# Patient Record
Sex: MALE | Race: WHITE | Employment: OTHER | URBAN - NONMETROPOLITAN AREA
[De-identification: names, ages, dates, MRNs, and addresses within clinical notes are randomized per-mention and may not be internally consistent; named-entity substitution may affect disease eponyms.]

---

## 2017-01-30 ENCOUNTER — TELEPHONE (OUTPATIENT)
Dept: NEUROLOGY | Age: 82
End: 2017-01-30

## 2017-02-07 DIAGNOSIS — G30.1 LATE ONSET ALZHEIMER'S DISEASE WITHOUT BEHAVIORAL DISTURBANCE (HCC): ICD-10-CM

## 2017-02-07 DIAGNOSIS — F02.80 LATE ONSET ALZHEIMER'S DISEASE WITHOUT BEHAVIORAL DISTURBANCE (HCC): ICD-10-CM

## 2017-02-07 RX ORDER — NUT.TX,METAB.DIS, MV-MINS NO.2 20GRAM/40
1 POWDER IN PACKET (EA) ORAL DAILY
Qty: 30 EACH | Refills: 5 | Status: SHIPPED | OUTPATIENT
Start: 2017-02-07 | End: 2017-10-06 | Stop reason: HOSPADM

## 2017-04-06 ENCOUNTER — OFFICE VISIT (OUTPATIENT)
Dept: NEUROLOGY | Age: 82
End: 2017-04-06
Payer: MEDICARE

## 2017-04-06 VITALS
HEIGHT: 70 IN | DIASTOLIC BLOOD PRESSURE: 69 MMHG | BODY MASS INDEX: 26.92 KG/M2 | SYSTOLIC BLOOD PRESSURE: 138 MMHG | WEIGHT: 188 LBS | HEART RATE: 58 BPM

## 2017-04-06 DIAGNOSIS — R41.3 MEMORY LOSS: ICD-10-CM

## 2017-04-06 DIAGNOSIS — F02.80 LATE ONSET ALZHEIMER'S DISEASE WITHOUT BEHAVIORAL DISTURBANCE (HCC): Primary | ICD-10-CM

## 2017-04-06 DIAGNOSIS — Z86.59 HISTORY OF DEPRESSION: ICD-10-CM

## 2017-04-06 DIAGNOSIS — G30.1 LATE ONSET ALZHEIMER'S DISEASE WITHOUT BEHAVIORAL DISTURBANCE (HCC): Primary | ICD-10-CM

## 2017-04-06 PROCEDURE — 99214 OFFICE O/P EST MOD 30 MIN: CPT | Performed by: PSYCHIATRY & NEUROLOGY

## 2017-04-06 RX ORDER — HYDROCHLOROTHIAZIDE 12.5 MG/1
12.5 CAPSULE, GELATIN COATED ORAL DAILY
COMMUNITY
End: 2022-10-17

## 2017-05-05 ENCOUNTER — APPOINTMENT (OUTPATIENT)
Dept: PREADMISSION TESTING | Facility: HOSPITAL | Age: 82
End: 2017-05-05

## 2017-05-05 VITALS
HEART RATE: 54 BPM | BODY MASS INDEX: 28.79 KG/M2 | SYSTOLIC BLOOD PRESSURE: 148 MMHG | OXYGEN SATURATION: 96 % | RESPIRATION RATE: 20 BRPM | HEIGHT: 68 IN | DIASTOLIC BLOOD PRESSURE: 72 MMHG | WEIGHT: 190 LBS

## 2017-05-05 LAB
ANION GAP SERPL CALCULATED.3IONS-SCNC: 10 MMOL/L (ref 4–13)
BUN BLD-MCNC: 18 MG/DL (ref 5–21)
BUN/CREAT SERPL: 18.4 (ref 7–25)
CALCIUM SPEC-SCNC: 10 MG/DL (ref 8.4–10.4)
CHLORIDE SERPL-SCNC: 101 MMOL/L (ref 98–110)
CO2 SERPL-SCNC: 29 MMOL/L (ref 24–31)
CREAT BLD-MCNC: 0.98 MG/DL (ref 0.5–1.4)
DEPRECATED RDW RBC AUTO: 39.2 FL (ref 40–54)
ERYTHROCYTE [DISTWIDTH] IN BLOOD BY AUTOMATED COUNT: 12.4 % (ref 12–15)
GFR SERPL CREATININE-BSD FRML MDRD: 74 ML/MIN/1.73
GLUCOSE BLD-MCNC: 87 MG/DL (ref 70–100)
HCT VFR BLD AUTO: 43.4 % (ref 40–52)
HGB BLD-MCNC: 15.1 G/DL (ref 14–18)
MCH RBC QN AUTO: 30 PG (ref 28–32)
MCHC RBC AUTO-ENTMCNC: 34.8 G/DL (ref 33–36)
MCV RBC AUTO: 86.1 FL (ref 82–95)
PLATELET # BLD AUTO: 197 10*3/MM3 (ref 130–400)
PMV BLD AUTO: 9.7 FL (ref 6–12)
POTASSIUM BLD-SCNC: 4.7 MMOL/L (ref 3.5–5.3)
RBC # BLD AUTO: 5.04 10*6/MM3 (ref 4.8–5.9)
SODIUM BLD-SCNC: 140 MMOL/L (ref 135–145)
WBC NRBC COR # BLD: 6.41 10*3/MM3 (ref 4.8–10.8)

## 2017-05-05 PROCEDURE — 85027 COMPLETE CBC AUTOMATED: CPT | Performed by: ORTHOPAEDIC SURGERY

## 2017-05-05 PROCEDURE — 93010 ELECTROCARDIOGRAM REPORT: CPT | Performed by: INTERNAL MEDICINE

## 2017-05-05 PROCEDURE — 80048 BASIC METABOLIC PNL TOTAL CA: CPT | Performed by: ORTHOPAEDIC SURGERY

## 2017-05-05 PROCEDURE — 93005 ELECTROCARDIOGRAM TRACING: CPT

## 2017-05-05 RX ORDER — CITALOPRAM 20 MG/1
20 TABLET ORAL 2 TIMES DAILY
COMMUNITY
End: 2021-01-05 | Stop reason: SDUPTHER

## 2017-05-05 RX ORDER — AMLODIPINE BESYLATE 10 MG/1
10 TABLET ORAL 2 TIMES DAILY
COMMUNITY
End: 2021-04-15

## 2017-05-05 RX ORDER — HYDROCHLOROTHIAZIDE 12.5 MG/1
12.5 TABLET ORAL DAILY
COMMUNITY
End: 2020-06-08

## 2017-05-05 RX ORDER — ASPIRIN 81 MG/1
81 TABLET ORAL 2 TIMES DAILY
COMMUNITY
End: 2021-10-28

## 2017-05-05 RX ORDER — RAMIPRIL 10 MG/1
10 CAPSULE ORAL 2 TIMES DAILY
COMMUNITY
End: 2021-04-15

## 2017-05-12 ENCOUNTER — HOSPITAL ENCOUNTER (OUTPATIENT)
Facility: HOSPITAL | Age: 82
Setting detail: HOSPITAL OUTPATIENT SURGERY
Discharge: HOME | End: 2017-05-12
Attending: ORTHOPAEDIC SURGERY | Admitting: ORTHOPAEDIC SURGERY

## 2017-05-12 ENCOUNTER — ANESTHESIA (OUTPATIENT)
Dept: PERIOP | Facility: HOSPITAL | Age: 82
End: 2017-05-12

## 2017-05-12 ENCOUNTER — ANESTHESIA EVENT (OUTPATIENT)
Dept: PERIOP | Facility: HOSPITAL | Age: 82
End: 2017-05-12

## 2017-05-12 VITALS
OXYGEN SATURATION: 98 % | SYSTOLIC BLOOD PRESSURE: 134 MMHG | TEMPERATURE: 98.1 F | HEART RATE: 57 BPM | RESPIRATION RATE: 18 BRPM | DIASTOLIC BLOOD PRESSURE: 48 MMHG

## 2017-05-12 PROCEDURE — 25010000002 ONDANSETRON PER 1 MG: Performed by: ANESTHESIOLOGY

## 2017-05-12 PROCEDURE — 25010000002 PROPOFOL 1000 MG/ML EMULSION: Performed by: NURSE ANESTHETIST, CERTIFIED REGISTERED

## 2017-05-12 PROCEDURE — 25010000003 CEFAZOLIN PER 500 MG: Performed by: ORTHOPAEDIC SURGERY

## 2017-05-12 RX ORDER — MORPHINE SULFATE 2 MG/ML
2 INJECTION, SOLUTION INTRAMUSCULAR; INTRAVENOUS
Status: DISCONTINUED | OUTPATIENT
Start: 2017-05-12 | End: 2017-05-12 | Stop reason: HOSPADM

## 2017-05-12 RX ORDER — DEXTROSE MONOHYDRATE 25 G/50ML
12.5 INJECTION, SOLUTION INTRAVENOUS AS NEEDED
Status: DISCONTINUED | OUTPATIENT
Start: 2017-05-12 | End: 2017-05-12 | Stop reason: HOSPADM

## 2017-05-12 RX ORDER — ONDANSETRON 2 MG/ML
4 INJECTION INTRAMUSCULAR; INTRAVENOUS ONCE AS NEEDED
Status: COMPLETED | OUTPATIENT
Start: 2017-05-12 | End: 2017-05-12

## 2017-05-12 RX ORDER — MEPERIDINE HYDROCHLORIDE 25 MG/ML
12.5 INJECTION INTRAMUSCULAR; INTRAVENOUS; SUBCUTANEOUS
Status: DISCONTINUED | OUTPATIENT
Start: 2017-05-12 | End: 2017-05-12 | Stop reason: HOSPADM

## 2017-05-12 RX ORDER — ONDANSETRON 4 MG/1
4 TABLET, FILM COATED ORAL EVERY 8 HOURS PRN
Qty: 20 TABLET | Refills: 0 | Status: SHIPPED | OUTPATIENT
Start: 2017-05-12 | End: 2018-09-30

## 2017-05-12 RX ORDER — SODIUM CHLORIDE 0.9 % (FLUSH) 0.9 %
1-10 SYRINGE (ML) INJECTION AS NEEDED
Status: DISCONTINUED | OUTPATIENT
Start: 2017-05-12 | End: 2017-05-12 | Stop reason: HOSPADM

## 2017-05-12 RX ORDER — LIDOCAINE HYDROCHLORIDE 20 MG/ML
INJECTION, SOLUTION INFILTRATION; PERINEURAL AS NEEDED
Status: DISCONTINUED | OUTPATIENT
Start: 2017-05-12 | End: 2017-05-12 | Stop reason: SURG

## 2017-05-12 RX ORDER — HYDRALAZINE HYDROCHLORIDE 20 MG/ML
5 INJECTION INTRAMUSCULAR; INTRAVENOUS
Status: DISCONTINUED | OUTPATIENT
Start: 2017-05-12 | End: 2017-05-12 | Stop reason: HOSPADM

## 2017-05-12 RX ORDER — FENTANYL CITRATE 50 UG/ML
25 INJECTION, SOLUTION INTRAMUSCULAR; INTRAVENOUS
Status: DISCONTINUED | OUTPATIENT
Start: 2017-05-12 | End: 2017-05-12 | Stop reason: HOSPADM

## 2017-05-12 RX ORDER — IPRATROPIUM BROMIDE AND ALBUTEROL SULFATE 2.5; .5 MG/3ML; MG/3ML
3 SOLUTION RESPIRATORY (INHALATION) ONCE AS NEEDED
Status: DISCONTINUED | OUTPATIENT
Start: 2017-05-12 | End: 2017-05-12 | Stop reason: HOSPADM

## 2017-05-12 RX ORDER — SODIUM CHLORIDE, SODIUM LACTATE, POTASSIUM CHLORIDE, CALCIUM CHLORIDE 600; 310; 30; 20 MG/100ML; MG/100ML; MG/100ML; MG/100ML
9 INJECTION, SOLUTION INTRAVENOUS CONTINUOUS
Status: DISCONTINUED | OUTPATIENT
Start: 2017-05-12 | End: 2017-05-12 | Stop reason: HOSPADM

## 2017-05-12 RX ORDER — HYDROCODONE BITARTRATE AND ACETAMINOPHEN 5; 325 MG/1; MG/1
1 TABLET ORAL ONCE AS NEEDED
Status: DISCONTINUED | OUTPATIENT
Start: 2017-05-12 | End: 2017-05-12 | Stop reason: HOSPADM

## 2017-05-12 RX ORDER — ONDANSETRON 2 MG/ML
4 INJECTION INTRAMUSCULAR; INTRAVENOUS ONCE AS NEEDED
Status: DISCONTINUED | OUTPATIENT
Start: 2017-05-12 | End: 2017-05-12 | Stop reason: HOSPADM

## 2017-05-12 RX ORDER — HYDROCODONE BITARTRATE AND ACETAMINOPHEN 5; 325 MG/1; MG/1
1 TABLET ORAL EVERY 4 HOURS PRN
Qty: 30 TABLET | Refills: 0 | Status: SHIPPED | OUTPATIENT
Start: 2017-05-12 | End: 2018-09-30

## 2017-05-12 RX ORDER — LABETALOL HYDROCHLORIDE 5 MG/ML
5 INJECTION, SOLUTION INTRAVENOUS
Status: DISCONTINUED | OUTPATIENT
Start: 2017-05-12 | End: 2017-05-12 | Stop reason: HOSPADM

## 2017-05-12 RX ORDER — LIDOCAINE HYDROCHLORIDE 5 MG/ML
INJECTION, SOLUTION INFILTRATION; INTRAVENOUS AS NEEDED
Status: DISCONTINUED | OUTPATIENT
Start: 2017-05-12 | End: 2017-05-12 | Stop reason: SURG

## 2017-05-12 RX ORDER — MAGNESIUM HYDROXIDE 1200 MG/15ML
LIQUID ORAL AS NEEDED
Status: DISCONTINUED | OUTPATIENT
Start: 2017-05-12 | End: 2017-05-12 | Stop reason: HOSPADM

## 2017-05-12 RX ORDER — DIPHENHYDRAMINE HYDROCHLORIDE 50 MG/ML
12.5 INJECTION INTRAMUSCULAR; INTRAVENOUS
Status: DISCONTINUED | OUTPATIENT
Start: 2017-05-12 | End: 2017-05-12 | Stop reason: HOSPADM

## 2017-05-12 RX ORDER — NALOXONE HCL 0.4 MG/ML
0.4 VIAL (ML) INJECTION AS NEEDED
Status: DISCONTINUED | OUTPATIENT
Start: 2017-05-12 | End: 2017-05-12 | Stop reason: HOSPADM

## 2017-05-12 RX ADMIN — LIDOCAINE HYDROCHLORIDE 50 ML: 5 INJECTION, SOLUTION INFILTRATION at 09:17

## 2017-05-12 RX ADMIN — SODIUM CHLORIDE, POTASSIUM CHLORIDE, SODIUM LACTATE AND CALCIUM CHLORIDE 9 ML/HR: 600; 310; 30; 20 INJECTION, SOLUTION INTRAVENOUS at 07:51

## 2017-05-12 RX ADMIN — HYDROCODONE BITARTRATE AND ACETAMINOPHEN 1 TABLET: 5; 325 TABLET ORAL at 09:57

## 2017-05-12 RX ADMIN — ONDANSETRON 4 MG: 2 INJECTION INTRAMUSCULAR; INTRAVENOUS at 09:58

## 2017-05-12 RX ADMIN — CEFAZOLIN 2 G: 1 INJECTION, POWDER, FOR SOLUTION INTRAVENOUS at 09:18

## 2017-05-12 RX ADMIN — LIDOCAINE HYDROCHLORIDE 0.5 ML: 10 INJECTION, SOLUTION EPIDURAL; INFILTRATION; INTRACAUDAL; PERINEURAL at 07:51

## 2017-05-12 RX ADMIN — LIDOCAINE HYDROCHLORIDE 50 MG: 20 INJECTION, SOLUTION INFILTRATION; PERINEURAL at 09:14

## 2017-05-12 RX ADMIN — PROPOFOL 100 MCG/KG/MIN: 10 INJECTION, EMULSION INTRAVENOUS at 09:14

## 2017-10-06 ENCOUNTER — OFFICE VISIT (OUTPATIENT)
Dept: NEUROLOGY | Age: 82
End: 2017-10-06
Payer: MEDICARE

## 2017-10-06 VITALS
OXYGEN SATURATION: 98 % | DIASTOLIC BLOOD PRESSURE: 64 MMHG | WEIGHT: 182 LBS | HEIGHT: 70 IN | SYSTOLIC BLOOD PRESSURE: 122 MMHG | BODY MASS INDEX: 26.05 KG/M2 | HEART RATE: 57 BPM

## 2017-10-06 DIAGNOSIS — Z86.59 HISTORY OF DEPRESSION: ICD-10-CM

## 2017-10-06 DIAGNOSIS — F02.80 LATE ONSET ALZHEIMER'S DISEASE WITHOUT BEHAVIORAL DISTURBANCE (HCC): Primary | ICD-10-CM

## 2017-10-06 DIAGNOSIS — G30.1 LATE ONSET ALZHEIMER'S DISEASE WITHOUT BEHAVIORAL DISTURBANCE (HCC): Primary | ICD-10-CM

## 2017-10-06 DIAGNOSIS — R41.3 MEMORY LOSS: ICD-10-CM

## 2017-10-06 PROCEDURE — 99214 OFFICE O/P EST MOD 30 MIN: CPT | Performed by: PSYCHIATRY & NEUROLOGY

## 2017-10-06 NOTE — PROGRESS NOTES
Chief Complaint   Patient presents with    Follow-up     Late onset Alzheimer's disease without behavioral disturbance       Shahida Reyes is a 80y.o. year old male who is seen for evaluation of probable Alzheimer's disease. Luis A iKnney He continues on Namenda and  Aricept. He ran out of the axona powder about a month ago as it has apparently stopped being manufactured. He is here today with his wife. She notes that his memory has slowly worsened. No hallucinations, delusions or agitation noted. He has had a B12, TSH and MRI of the head in the past. His old records are reviewed in detail. We had an extended discussion regarding all of the above. No focal neurological difficulties noted. Luis A Kinney Has a history of depression. Active Ambulatory Problems     Diagnosis Date Noted    Anal bleeding 08/23/2013    Rectal itching 08/23/2013    History of colon polyps 08/23/2013    Trigger finger, left middle finger 12/21/2016     Resolved Ambulatory Problems     Diagnosis Date Noted    No Resolved Ambulatory Problems     Past Medical History:   Diagnosis Date    Depression     Hypertension     Memory difficulty     Osteoarthritis     Trigger finger     Urinary incontinence        Past Surgical History:   Procedure Laterality Date    CERVICAL DISC SURGERY      COLONOSCOPY  8-    BODNARCHUK    FINGER TRIGGER RELEASE Left 12/21/2016    MIDDLE FINGER TRIGGER RELEASE performed by Abdiel Belle MD at [address] [place] [address]      RECTAL SURGERY      RECTAL NODULE REMOVAL    RECTAL SURGERY      FISSURE REPAIR    TONSILLECTOMY AND ADENOIDECTOMY         Family History   Problem Relation Age of Onset    Colon Cancer Neg Hx     Colon Polyps Neg Hx        Allergies   Allergen Reactions    Versed [Midazolam]      Patient already has memory issues and does not want versed.        Social History     Social History    Marital status:      Spouse name: N/A    Number of children: N/A    Years of education: N/A Occupational History    Not on file. Social History Main Topics    Smoking status: Never Smoker    Smokeless tobacco: Never Used    Alcohol use No    Drug use: No    Sexual activity: Not on file     Other Topics Concern    Not on file     Social History Narrative       Review of Systems       Constitutional - No fever or chills. No diaphoresis or significant fatigue. HENT -  No tinnitus or significant hearing loss. Eyes - no sudden vision change or eye pain  Respiratory - no significant shortness of breath or cough  Cardiovascular - no chest pain No palpitations or significant leg swelling  Gastrointestinal - no abdominal swelling or pain. Genitourinary - No difficulty urinating, dysuria  Musculoskeletal - no back pain or myalgia. Skin - no color change or rash  Neurologic - No seizures. No lateralizing weakness. Hematologic - no easy bruising or excessive bleeding. Psychiatric - no severe anxiety or nervousness. All other review of systems are negative.          Current Outpatient Prescriptions on File Prior to Visit   Medication Sig Dispense Refill    hydrochlorothiazide (MICROZIDE) 12.5 MG capsule Take 12.5 mg by mouth daily      aspirin 81 MG tablet Take 81 mg by mouth 2 times daily      amLODIPine (NORVASC) 10 MG tablet Take 10 mg by mouth 2 times daily       citalopram (CELEXA) 10 MG tablet Take 10 mg by mouth Takes 2 tablets in am and 1 pm      ramipril (ALTACE) 10 MG capsule Take 10 mg by mouth 2 times daily        No current facility-administered medications on file prior to visit. /64  Pulse 57  Ht 5' 10\" (1.778 m)  Wt 182 lb (82.6 kg)  SpO2 98%  BMI 26.11 kg/m2  (please note that spurious BP readings frequently obtained in this room)    Constitutional - well developed, well nourished.     Eyes - conjunctiva normal.  Pupils react to light  Ear, nose, throat -hearing intact to finger rub No scars, masses, or lesions over external nose or ears, no atrophy of tongue  Neck-symmetric, no masses noted, no jugular vein distension. No bruits noted. Respiration- chest wall appears symmetric, good expansion,   normal effort without use of accessory muscles  Cardiovascular- RRR  Musculoskeletal - no significant wasting of muscles noted, no bony deformities, gait no gross ataxia  Extremities-no clubbing, cyanosis or edema  Skin - warm, dry, and intact. No rash, erythema, or pallor. Psychiatric - mood, affect, and behavior appear normal.      Neurological exam  Awake and alert. Normal clock drawing. He could not tell me the month but stated it was \"late summer\". When asked what happened in [facility] recently he was finally able to say \"I think someone got shot\". He watches the news a lot. He did great with following complex commands. Speech normal without dysarthria  No clear issues with language of fund of knowledge    Cranial Nerve Exam   CN II- Visual fields grossly unremarkable. VA adequate. PERRLA. CN III, IV,VI-EOMI, No nystagmus, conjugate eye movements, no ptosis  CN V-sensation intact to LT over face  CN VII-no facial asymmetry  CN VIII-Hearing intact to finger rub  CN IX and X- Palate elevates in midline  CN XI-good shoulder shrug  CN XII-Tongue midline with no fasciculations or fibrillations    Motor Exam  V/V throughout upper and lower extremities bilaterally, no cogwheeling, normal tone      Reflexes   2+ biceps bilaterally  2+ brachioradialis  2+ triceps  2+patella  2+ ankle jerks  No clonus ankles  No Oakley's sign bilateral hands. No Babinski sign. Tremors- no tremors in hands or head noted    Gait  Normal base and speed  No ataxia.  No Romberg sign    Coordination  Finger to nose-unremarkable  HTS- unremarkable  No results found for: [id number]  Lab Results   Component Value Date    WBC 11.64 (H) 05/20/2015    HGB 12.4 (L) 05/20/2015    HCT 36.5 (L) 05/20/2015    MCV 88.4 05/20/2015     05/20/2015     Lab Results   Component Value Date

## 2017-10-06 NOTE — PROGRESS NOTES

## 2017-10-06 NOTE — MR AVS SNAPSHOT
After Visit Summary             Margo Lopez   10/6/2017 9:00 AM   Office Visit    Description:  Male : 1935   Provider:  Jason Dumont MD   Department:  [facility]              Your Follow-Up and Future Appointments         Below is a list of your follow-up and future appointments. This may not be a complete list as you may have made appointments directly with providers that we are not aware of or your providers may have made some for you. Please call your providers to confirm appointments. It is important to keep your appointments. Please bring your current insurance card, photo ID, co-pay, and all medication bottles to your appointment. If self-pay, payment is expected at the time of service. Your To-Do List     Future Appointments Provider Department Dept Phone    10/8/2018 8:00 AM Jason Dumont MD [facility] [phone]    Please arrive 15 minutes prior to appointment, bring photo ID and insurance card. Follow-Up    Return in about 1 year (around 10/6/2018). Information from Your Visit        Department     Name Address Phone Fax    [address] [place] [address] [address]  [place] [address] [phone]      You Were Seen for:         Comments    Late onset Alzheimer's disease without behavioral disturbance   [[id number]]         Vital Signs     Blood Pressure Pulse Height Weight Oxygen Saturation Body Mass Index    122/64 57 5' 10\" (1.778 m) 182 lb (82.6 kg) 98% 26.11 kg/m2    Smoking Status                   Never Smoker           Additional Information about your Body Mass Index (BMI)           Your BMI as listed above is considered overweight (25.0-29.9). BMI is an estimate of body fat, calculated from your height and weight.   The higher your BMI, the greater your risk of heart disease, high blood pressure, type 2 diabetes, stroke, gallstones, arthritis, sleep apnea, and certain cancers. BMI is not perfect. It may overestimate body fat in athletes and people who are more muscular. If your body fat is high you can improve your BMI by decreasing your calorie intake and becoming more physically active. Learn more at: [email or web address].[email or web address]             Today's Medication Changes          These changes are accurate as of: 10/6/17  9:50 AM.  If you have any questions, ask your nurse or doctor. STOP taking these medications           AXONA Pack   Stopped by:  Richar Dunham MD       ondansetron 4 MG tablet   Commonly known as:  Carolina Belle by:  Richar Dunham MD            Where to Get Your Medications      These medications were sent to [facility], [address] [address] [facility], [address] [place] [address]     Phone:  [phone]     Memantine HCl-Donepezil HCl 28-10 MG Cp24               Your Current Medications Are              Memantine HCl-Donepezil HCl (NAMZARIC) 28-10 MG CP24 Take 1 capsule by mouth daily    hydrochlorothiazide (MICROZIDE) 12.5 MG capsule Take 12.5 mg by mouth daily    aspirin 81 MG tablet Take 81 mg by mouth 2 times daily    amLODIPine (NORVASC) 10 MG tablet Take 10 mg by mouth 2 times daily     citalopram (CELEXA) 10 MG tablet Take 10 mg by mouth Takes 2 tablets in am and 1 pm    ramipril (ALTACE) 10 MG capsule Take 10 mg by mouth 2 times daily       Allergies              Versed [Midazolam]     Patient already has memory issues and does not want versed.          Additional Information        Basic Information     Date Of Birth Sex Race Ethnicity Preferred Language Preferred Written Language    1935 Male White Non-/Non  English English      Problem List as of 10/6/2017  Date Reviewed: 8/23/2013                Trigger finger, left middle finger    Anal bleeding    Rectal itching    History of colon polyps      Preventive Care        Date Due Tetanus Combination Vaccine (1 - Tdap) 12/31/1956    Cholesterol Screening 12/31/1977    Zoster Vaccine 12/31/1997    Pneumococcal Vaccines (two) for all adults aged 72 and over (1 of 2 - PCV13) 12/31/2002    Yearly Flu Vaccine (1) 9/1/2017            MyChart Signup           [employer] allows you to send messages to your doctor, view your test results, renew your prescriptions, schedule appointments, view visit notes, and more. How Do I Sign Up? 1. In your Internet browser, go to [email or web address]  2. Click on the Sign Up Now link in the Sign In box. You will see the New Member Sign Up page. 3. Enter your [employer]end[employer] Access Code exactly as it appears below. You will not need to use this code after youve completed the sign-up process. If you do not sign up before the expiration date, you must request a new code. Blend[employer] Access Code: [email or web address]  Expires: 12/5/2017  9:50 AM    4. Enter your Social Security Number ([id number]) and Date of Birth (mm/dd/yyyy) as indicated and click Submit. You will be taken to the next sign-up page. 5. Create a Blendagram ID. This will be your [employer] login ID and cannot be changed, so think of one that is secure and easy to remember. 6. Create a Blendagram password. You can change your password at any time. 7. Enter your Password Reset Question and Answer. This can be used at a later time if you forget your password. 8. Enter your e-mail address. You will receive e-mail notification when new information is available in [address]. 9. Click Sign Up. You can now view your medical record. Additional Information  If you have questions, please contact the physician practice where you receive care. Remember, [employer] is NOT to be used for urgent needs. For medical emergencies, dial 911. For questions regarding your [employer] account call [phone]. If you have a clinical question, please call your doctor's office.

## 2017-10-10 ENCOUNTER — TELEPHONE (OUTPATIENT)
Dept: NEUROLOGY | Age: 82
End: 2017-10-10

## 2017-11-01 ENCOUNTER — TELEPHONE (OUTPATIENT)
Dept: NEUROSURGERY | Age: 82
End: 2017-11-01

## 2017-11-01 NOTE — TELEPHONE ENCOUNTER
Spoke to the rep and the patient. We are working on getting the copay lowered and we have samples for him to cover for a month while we are working on getting the copay lowered.

## 2017-11-01 NOTE — TELEPHONE ENCOUNTER
Patient is having issues paying for namzaric. It is too expensive, is there anyway to get it cheaper? Or is there a coupon that she can have?  If they have to pay it then that's fine but its $150 out of pocket

## 2017-12-05 NOTE — TELEPHONE ENCOUNTER
Patients wife called and wanting to know about what is going on with the medication approval.  She would like a call back please.

## 2017-12-06 ENCOUNTER — TELEPHONE (OUTPATIENT)
Dept: NEUROLOGY | Age: 82
End: 2017-12-06

## 2017-12-06 NOTE — TELEPHONE ENCOUNTER
Looks like we may just want to send in Aricept and Namenda separately. If this is so, please to these up and I will sign.

## 2017-12-06 NOTE — TELEPHONE ENCOUNTER
Patients insurance will not pay for the month of December for the medication. Patients wife wants to know if we have samples for the namazric. Insurance will pick it back up in January. Patient has been without medication for a few days now.

## 2017-12-07 NOTE — TELEPHONE ENCOUNTER
Called patient and informed we did not have samples at this time but we can send in aricept and namenda to [facility] pharmacy.  All teed up and sent to dr Lashawn Drake

## 2017-12-08 RX ORDER — DONEPEZIL HYDROCHLORIDE 10 MG/1
10 TABLET, FILM COATED ORAL NIGHTLY
Qty: 30 TABLET | Refills: 5 | Status: SHIPPED | OUTPATIENT
Start: 2017-12-08 | End: 2022-10-17

## 2017-12-08 RX ORDER — MEMANTINE HYDROCHLORIDE 10 MG/1
10 TABLET ORAL 2 TIMES DAILY
Qty: 60 TABLET | Refills: 5 | Status: SHIPPED | OUTPATIENT
Start: 2017-12-08 | End: 2018-10-29

## 2018-01-02 RX ORDER — MEMANTINE HYDROCHLORIDE AND DONEPEZIL HYDROCHLORIDE 28; 10 MG/1; MG/1
1 CAPSULE ORAL DAILY
Qty: 30 CAPSULE | Refills: 0 | Status: SHIPPED | OUTPATIENT
Start: 2018-01-02 | End: 2018-10-29 | Stop reason: SDUPTHER

## 2018-09-06 ENCOUNTER — TELEPHONE (OUTPATIENT)
Dept: NEUROLOGY | Age: 83
End: 2018-09-06

## 2018-09-30 ENCOUNTER — APPOINTMENT (OUTPATIENT)
Dept: ULTRASOUND IMAGING | Facility: HOSPITAL | Age: 83
End: 2018-09-30

## 2018-09-30 ENCOUNTER — HOSPITAL ENCOUNTER (EMERGENCY)
Facility: HOSPITAL | Age: 83
Discharge: HOME | End: 2018-09-30
Admitting: EMERGENCY MEDICINE

## 2018-09-30 VITALS
BODY MASS INDEX: 26.45 KG/M2 | HEIGHT: 70 IN | OXYGEN SATURATION: 96 % | SYSTOLIC BLOOD PRESSURE: 122 MMHG | DIASTOLIC BLOOD PRESSURE: 67 MMHG | TEMPERATURE: 98.2 F | HEART RATE: 59 BPM | WEIGHT: 184.8 LBS | RESPIRATION RATE: 15 BRPM

## 2018-09-30 DIAGNOSIS — L03.90 CELLULITIS, UNSPECIFIED CELLULITIS SITE: ICD-10-CM

## 2018-09-30 DIAGNOSIS — R21 RASH: Primary | ICD-10-CM

## 2018-09-30 LAB
ALBUMIN SERPL-MCNC: 4 G/DL (ref 3.5–5)
ALBUMIN/GLOB SERPL: 1.4 G/DL (ref 1.1–2.5)
ALP SERPL-CCNC: 63 U/L (ref 24–120)
ALT SERPL W P-5'-P-CCNC: 21 U/L (ref 0–54)
ANION GAP SERPL CALCULATED.3IONS-SCNC: 9 MMOL/L (ref 4–13)
AST SERPL-CCNC: 25 U/L (ref 7–45)
BASOPHILS # BLD AUTO: 0.02 10*3/MM3 (ref 0–0.2)
BASOPHILS NFR BLD AUTO: 0.3 % (ref 0–2)
BILIRUB SERPL-MCNC: 0.7 MG/DL (ref 0.1–1)
BUN BLD-MCNC: 25 MG/DL (ref 5–21)
BUN/CREAT SERPL: 19.8 (ref 7–25)
CALCIUM SPEC-SCNC: 9.4 MG/DL (ref 8.4–10.4)
CHLORIDE SERPL-SCNC: 105 MMOL/L (ref 98–110)
CO2 SERPL-SCNC: 26 MMOL/L (ref 24–31)
CREAT BLD-MCNC: 1.26 MG/DL (ref 0.5–1.4)
DEPRECATED RDW RBC AUTO: 39.7 FL (ref 40–54)
EOSINOPHIL # BLD AUTO: 0.45 10*3/MM3 (ref 0–0.7)
EOSINOPHIL NFR BLD AUTO: 6.6 % (ref 0–4)
ERYTHROCYTE [DISTWIDTH] IN BLOOD BY AUTOMATED COUNT: 12.4 % (ref 12–15)
GFR SERPL CREATININE-BSD FRML MDRD: 55 ML/MIN/1.73
GLOBULIN UR ELPH-MCNC: 2.8 GM/DL
GLUCOSE BLD-MCNC: 106 MG/DL (ref 70–100)
HCT VFR BLD AUTO: 41.6 % (ref 40–52)
HGB BLD-MCNC: 14.3 G/DL (ref 14–18)
IMM GRANULOCYTES # BLD: 0.02 10*3/MM3 (ref 0–0.03)
IMM GRANULOCYTES NFR BLD: 0.3 % (ref 0–5)
LYMPHOCYTES # BLD AUTO: 1.35 10*3/MM3 (ref 0.72–4.86)
LYMPHOCYTES NFR BLD AUTO: 19.9 % (ref 15–45)
MCH RBC QN AUTO: 29.9 PG (ref 28–32)
MCHC RBC AUTO-ENTMCNC: 34.4 G/DL (ref 33–36)
MCV RBC AUTO: 87 FL (ref 82–95)
MONOCYTES # BLD AUTO: 0.59 10*3/MM3 (ref 0.19–1.3)
MONOCYTES NFR BLD AUTO: 8.7 % (ref 4–12)
NEUTROPHILS # BLD AUTO: 4.34 10*3/MM3 (ref 1.87–8.4)
NEUTROPHILS NFR BLD AUTO: 64.2 % (ref 39–78)
NRBC BLD MANUAL-RTO: 0 /100 WBC (ref 0–0)
PLATELET # BLD AUTO: 224 10*3/MM3 (ref 130–400)
PMV BLD AUTO: 9.4 FL (ref 6–12)
POTASSIUM BLD-SCNC: 4.3 MMOL/L (ref 3.5–5.3)
PROT SERPL-MCNC: 6.8 G/DL (ref 6.3–8.7)
RBC # BLD AUTO: 4.78 10*6/MM3 (ref 4.8–5.9)
SODIUM BLD-SCNC: 140 MMOL/L (ref 135–145)
WBC NRBC COR # BLD: 6.77 10*3/MM3 (ref 4.8–10.8)

## 2018-09-30 PROCEDURE — 85025 COMPLETE CBC W/AUTO DIFF WBC: CPT | Performed by: NURSE PRACTITIONER

## 2018-09-30 PROCEDURE — 96375 TX/PRO/DX INJ NEW DRUG ADDON: CPT

## 2018-09-30 PROCEDURE — 96374 THER/PROPH/DIAG INJ IV PUSH: CPT

## 2018-09-30 PROCEDURE — 93971 EXTREMITY STUDY: CPT

## 2018-09-30 PROCEDURE — 25010000002 METHYLPREDNISOLONE PER 125 MG: Performed by: NURSE PRACTITIONER

## 2018-09-30 PROCEDURE — 25010000002 DIPHENHYDRAMINE PER 50 MG: Performed by: NURSE PRACTITIONER

## 2018-09-30 PROCEDURE — 93971 EXTREMITY STUDY: CPT | Performed by: SURGERY

## 2018-09-30 PROCEDURE — 99283 EMERGENCY DEPT VISIT LOW MDM: CPT

## 2018-09-30 PROCEDURE — 80053 COMPREHEN METABOLIC PANEL: CPT | Performed by: NURSE PRACTITIONER

## 2018-09-30 RX ORDER — METHYLPREDNISOLONE SODIUM SUCCINATE 125 MG/2ML
125 INJECTION, POWDER, LYOPHILIZED, FOR SOLUTION INTRAMUSCULAR; INTRAVENOUS ONCE
Status: COMPLETED | OUTPATIENT
Start: 2018-09-30 | End: 2018-09-30

## 2018-09-30 RX ORDER — FAMOTIDINE 20 MG/1
20 TABLET, FILM COATED ORAL NIGHTLY
Qty: 5 TABLET | Refills: 0 | Status: SHIPPED | OUTPATIENT
Start: 2018-09-30 | End: 2018-10-05

## 2018-09-30 RX ORDER — SODIUM CHLORIDE 0.9 % (FLUSH) 0.9 %
10 SYRINGE (ML) INJECTION AS NEEDED
Status: DISCONTINUED | OUTPATIENT
Start: 2018-09-30 | End: 2018-09-30 | Stop reason: HOSPADM

## 2018-09-30 RX ORDER — DIPHENHYDRAMINE HYDROCHLORIDE 50 MG/ML
12.5 INJECTION INTRAMUSCULAR; INTRAVENOUS ONCE
Status: COMPLETED | OUTPATIENT
Start: 2018-09-30 | End: 2018-09-30

## 2018-09-30 RX ORDER — FAMOTIDINE 10 MG/ML
20 INJECTION, SOLUTION INTRAVENOUS ONCE
Status: COMPLETED | OUTPATIENT
Start: 2018-09-30 | End: 2018-09-30

## 2018-09-30 RX ORDER — METHYLPREDNISOLONE 4 MG/1
TABLET ORAL
Qty: 1 EACH | Refills: 0 | Status: SHIPPED | OUTPATIENT
Start: 2018-09-30 | End: 2020-10-22

## 2018-09-30 RX ORDER — CLINDAMYCIN HYDROCHLORIDE 300 MG/1
300 CAPSULE ORAL 3 TIMES DAILY
Qty: 21 CAPSULE | Refills: 0 | Status: SHIPPED | OUTPATIENT
Start: 2018-09-30 | End: 2018-10-07

## 2018-09-30 RX ADMIN — FAMOTIDINE 20 MG: 10 INJECTION, SOLUTION INTRAVENOUS at 12:58

## 2018-09-30 RX ADMIN — DIPHENHYDRAMINE HYDROCHLORIDE 12.5 MG: 50 INJECTION, SOLUTION INTRAMUSCULAR; INTRAVENOUS at 12:52

## 2018-09-30 RX ADMIN — METHYLPREDNISOLONE SODIUM SUCCINATE 125 MG: 125 INJECTION, POWDER, FOR SOLUTION INTRAMUSCULAR; INTRAVENOUS at 12:55

## 2018-09-30 RX ADMIN — Medication 10 ML: at 12:56

## 2018-10-29 ENCOUNTER — OFFICE VISIT (OUTPATIENT)
Dept: NEUROLOGY | Age: 83
End: 2018-10-29
Payer: MEDICARE

## 2018-10-29 VITALS
HEIGHT: 69 IN | SYSTOLIC BLOOD PRESSURE: 122 MMHG | DIASTOLIC BLOOD PRESSURE: 66 MMHG | BODY MASS INDEX: 27.85 KG/M2 | WEIGHT: 188 LBS

## 2018-10-29 DIAGNOSIS — G30.1 LATE ONSET ALZHEIMER'S DISEASE WITHOUT BEHAVIORAL DISTURBANCE (HCC): Primary | ICD-10-CM

## 2018-10-29 DIAGNOSIS — Z86.59 HISTORY OF DEPRESSION: ICD-10-CM

## 2018-10-29 DIAGNOSIS — F02.80 LATE ONSET ALZHEIMER'S DISEASE WITHOUT BEHAVIORAL DISTURBANCE (HCC): Primary | ICD-10-CM

## 2018-10-29 DIAGNOSIS — R41.3 MEMORY LOSS: ICD-10-CM

## 2018-10-29 PROCEDURE — 99214 OFFICE O/P EST MOD 30 MIN: CPT | Performed by: PSYCHIATRY & NEUROLOGY

## 2018-10-29 RX ORDER — METOPROLOL SUCCINATE 50 MG/1
50 TABLET, EXTENDED RELEASE ORAL DAILY
COMMUNITY
End: 2022-10-17

## 2019-09-05 ENCOUNTER — OFFICE VISIT (OUTPATIENT)
Dept: NEUROLOGY | Age: 84
End: 2019-09-05
Payer: MEDICARE

## 2019-09-05 VITALS
DIASTOLIC BLOOD PRESSURE: 77 MMHG | WEIGHT: 183 LBS | BODY MASS INDEX: 27.11 KG/M2 | HEIGHT: 69 IN | SYSTOLIC BLOOD PRESSURE: 158 MMHG | HEART RATE: 44 BPM

## 2019-09-05 DIAGNOSIS — F02.80 LATE ONSET ALZHEIMER'S DISEASE WITHOUT BEHAVIORAL DISTURBANCE (HCC): Primary | ICD-10-CM

## 2019-09-05 DIAGNOSIS — G30.1 LATE ONSET ALZHEIMER'S DISEASE WITHOUT BEHAVIORAL DISTURBANCE (HCC): Primary | ICD-10-CM

## 2019-09-05 DIAGNOSIS — R41.3 MEMORY LOSS: ICD-10-CM

## 2019-09-05 DIAGNOSIS — Z86.59 HISTORY OF DEPRESSION: ICD-10-CM

## 2019-09-05 PROCEDURE — 99214 OFFICE O/P EST MOD 30 MIN: CPT | Performed by: PSYCHIATRY & NEUROLOGY

## 2019-09-06 NOTE — PROGRESS NOTES
Spouse name: Not on file    Number of children: Not on file    Years of education: Not on file    Highest education level: Not on file   Occupational History    Not on file   Social Needs    Financial resource strain: Not on file    Food insecurity:     Worry: Not on file     Inability: Not on file    Transportation needs:     Medical: Not on file     Non-medical: Not on file   Tobacco Use    Smoking status: Never Smoker    Smokeless tobacco: Never Used   Substance and Sexual Activity    Alcohol use: No    Drug use: No    Sexual activity: Not on file   Lifestyle    Physical activity:     Days per week: Not on file     Minutes per session: Not on file    Stress: Not on file   Relationships    Social connections:     Talks on phone: Not on file     Gets together: Not on file     Attends Denominational service: Not on file     Active member of club or organization: Not on file     Attends meetings of clubs or organizations: Not on file     Relationship status: Not on file    Intimate partner violence:     Fear of current or ex partner: Not on file     Emotionally abused: Not on file     Physically abused: Not on file     Forced sexual activity: Not on file   Other Topics Concern    Not on file   Social History Narrative    Not on file       Review of Systems             Constitutional: []Fever []Sweats []Chills [] Recent Injury   [x] Denies all unless marked  HENT:[]Headache  [] Head Injury  [] Sore Throat  [] Ear Pain  [] Dizziness [] Hearing Loss   [x] Denies all unless marked  Spine:  [] Neck pain  [] Back pain  [] Sciaticia  [x] Denies all unless marked  Cardiovascular:[]Chest Pain []Palpitations [] Heart Disease  [x] Denies all unless marked  Pulmonary: []Shortness of Breath []Cough   [x] Denies all unless marked  Gastrointestinal:  []Abdominal Pain  []Blood in Stool  []Diarrhea []Constipation []Nausea  []Vomiting  [x] Denies all unless marked  Genitourinary:  [] Dysuria [] Frequency  [] 12/15/2016    CO2 27 12/15/2016    BUN 18 12/15/2016    CREATININE 1.0 12/15/2016    GLUCOSE 95 12/15/2016    CALCIUM 9.8 12/15/2016    LABGLOM >60 12/15/2016           Assessment    ICD-10-CM    1. Late onset Alzheimer's disease without behavioral disturbance G30.1     F02.80    2. Memory loss R41.3    3. History of depression Z86.59      Alzheimer's disease is doing about the same perhaps slowly worsening. Depression is in remission. Plan  He has been encouraged to exercise routinely and engage socially as much as possible. We had an extended discussion regarding all the above. Return in about 1 year (around 9/5/2020).     (Please note that portions of this note were completed with a voice recognition program. Efforts were made to edit the dictations but occasionally words are mis-transcribed.)

## 2020-05-06 NOTE — TELEPHONE ENCOUNTER
PT's wife, Tricia, called to request a refill on the following (self-reported) medication: Metoprolol Succinate 25 MG capsule extended-release 24 hour sprinkle.    Confirmed Pharmacy:   [facility] - [place] [address] [facility] - [phone] [facility] [phone] FX

## 2020-05-18 ENCOUNTER — TELEPHONE (OUTPATIENT)
Dept: INTERNAL MEDICINE | Facility: CLINIC | Age: 85
End: 2020-05-18

## 2020-05-18 NOTE — TELEPHONE ENCOUNTER
Patients wife called in advised that the incorrect medication was prescribed and [employer] called and advised that what was sent was not a covered medication.     She advised that he is on Metoprolol 25 MG but it is a pill and not a sprinkle. Was first prescribed by Dr. Crook and was last received in Jan.     Please research and advise. Patient is completely out of medication. But [facility] is willing to overnight.     Please contact with any questions

## 2020-05-26 ENCOUNTER — OFFICE VISIT (OUTPATIENT)
Dept: OTOLARYNGOLOGY | Facility: CLINIC | Age: 85
End: 2020-05-26

## 2020-05-26 VITALS
SYSTOLIC BLOOD PRESSURE: 141 MMHG | TEMPERATURE: 98 F | HEIGHT: 69 IN | BODY MASS INDEX: 28.58 KG/M2 | HEART RATE: 65 BPM | DIASTOLIC BLOOD PRESSURE: 79 MMHG | RESPIRATION RATE: 20 BRPM | WEIGHT: 193 LBS

## 2020-05-26 DIAGNOSIS — C44.212 BASAL CELL CARCINOMA, EAR, RIGHT: ICD-10-CM

## 2020-05-26 DIAGNOSIS — Z79.02 ANTIPLATELET OR ANTITHROMBOTIC LONG-TERM USE: Primary | ICD-10-CM

## 2020-05-26 PROCEDURE — 99203 OFFICE O/P NEW LOW 30 MIN: CPT | Performed by: OTOLARYNGOLOGY

## 2020-05-26 RX ORDER — IBUPROFEN 200 MG
TABLET ORAL DAILY
COMMUNITY
End: 2021-10-31 | Stop reason: HOSPADM

## 2020-05-26 RX ORDER — DONEPEZIL HYDROCHLORIDE 10 MG/1
10 TABLET, FILM COATED ORAL
COMMUNITY
Start: 2017-12-08 | End: 2021-04-15 | Stop reason: ALTCHOICE

## 2020-05-26 RX ORDER — METOPROLOL SUCCINATE 50 MG/1
50 TABLET, EXTENDED RELEASE ORAL
COMMUNITY
End: 2020-10-22

## 2020-05-26 RX ORDER — MIRTAZAPINE 15 MG/1
TABLET, FILM COATED ORAL
COMMUNITY
End: 2020-05-26 | Stop reason: SDUPTHER

## 2020-06-08 RX ORDER — METOPROLOL SUCCINATE 25 MG/1
TABLET, EXTENDED RELEASE ORAL
Qty: 90 TABLET | Refills: 3 | Status: SHIPPED | OUTPATIENT
Start: 2020-06-08 | End: 2021-05-21

## 2020-06-08 RX ORDER — AMLODIPINE BESYLATE 5 MG/1
TABLET ORAL
Qty: 90 TABLET | Refills: 3 | Status: SHIPPED | OUTPATIENT
Start: 2020-06-08 | End: 2021-11-17

## 2020-06-08 RX ORDER — CITALOPRAM 10 MG/1
TABLET ORAL
Qty: 270 TABLET | Refills: 3 | Status: SHIPPED | OUTPATIENT
Start: 2020-06-08 | End: 2021-01-05 | Stop reason: SDUPTHER

## 2020-06-08 RX ORDER — HYDROCHLOROTHIAZIDE 12.5 MG/1
TABLET ORAL
Qty: 90 TABLET | Refills: 3 | Status: SHIPPED | OUTPATIENT
Start: 2020-06-08 | End: 2021-10-28 | Stop reason: SDUPTHER

## 2020-09-09 ENCOUNTER — TELEPHONE (OUTPATIENT)
Dept: NEUROLOGY | Age: 85
End: 2020-09-09

## 2020-09-09 NOTE — TELEPHONE ENCOUNTER
Called patient regarding his missed appointment today with Dr. Leroy Manzano. No answer. Left a VM for patient to call our office back to get his appointment rescheduled.

## 2020-10-22 ENCOUNTER — RESULTS ENCOUNTER (OUTPATIENT)
Dept: INTERNAL MEDICINE | Facility: CLINIC | Age: 85
End: 2020-10-22

## 2020-10-22 ENCOUNTER — OFFICE VISIT (OUTPATIENT)
Dept: INTERNAL MEDICINE | Facility: CLINIC | Age: 85
End: 2020-10-22

## 2020-10-22 VITALS
DIASTOLIC BLOOD PRESSURE: 78 MMHG | SYSTOLIC BLOOD PRESSURE: 130 MMHG | HEART RATE: 64 BPM | TEMPERATURE: 97.3 F | OXYGEN SATURATION: 98 % | HEIGHT: 69 IN | WEIGHT: 192.6 LBS | BODY MASS INDEX: 28.53 KG/M2

## 2020-10-22 DIAGNOSIS — E78.5 HYPERLIPIDEMIA, UNSPECIFIED HYPERLIPIDEMIA TYPE: ICD-10-CM

## 2020-10-22 DIAGNOSIS — I10 HYPERTENSION, UNSPECIFIED TYPE: ICD-10-CM

## 2020-10-22 DIAGNOSIS — I10 BENIGN HYPERTENSION: ICD-10-CM

## 2020-10-22 DIAGNOSIS — R73.01 IMPAIRED FASTING BLOOD SUGAR: ICD-10-CM

## 2020-10-22 DIAGNOSIS — E03.9 HYPOTHYROIDISM, UNSPECIFIED TYPE: ICD-10-CM

## 2020-10-22 DIAGNOSIS — E11.65 TYPE 2 DIABETES MELLITUS WITH HYPERGLYCEMIA, UNSPECIFIED WHETHER LONG TERM INSULIN USE (HCC): Primary | ICD-10-CM

## 2020-10-22 DIAGNOSIS — Z23 NEED FOR INFLUENZA VACCINATION: Primary | ICD-10-CM

## 2020-10-22 DIAGNOSIS — R41.3 MEMORY LOSS: ICD-10-CM

## 2020-10-22 PROBLEM — G47.10 EXCESSIVE SLEEPINESS: Status: ACTIVE | Noted: 2020-10-22

## 2020-10-22 PROBLEM — L57.0 ACTINIC KERATOSES: Status: ACTIVE | Noted: 2020-10-22

## 2020-10-22 PROBLEM — Q21.12 PFO (PATENT FORAMEN OVALE): Status: ACTIVE | Noted: 2020-10-22

## 2020-10-22 PROBLEM — N40.0 BENIGN PROSTATE HYPERPLASIA: Status: ACTIVE | Noted: 2020-10-22

## 2020-10-22 PROBLEM — K60.2 ANAL FISSURE: Status: ACTIVE | Noted: 2020-10-22

## 2020-10-22 LAB — HBA1C MFR BLD: 5.4 %

## 2020-10-22 PROCEDURE — G0438 PPPS, INITIAL VISIT: HCPCS | Performed by: INTERNAL MEDICINE

## 2020-10-22 PROCEDURE — 83036 HEMOGLOBIN GLYCOSYLATED A1C: CPT | Performed by: INTERNAL MEDICINE

## 2020-10-22 PROCEDURE — 90694 VACC AIIV4 NO PRSRV 0.5ML IM: CPT | Performed by: INTERNAL MEDICINE

## 2020-10-22 PROCEDURE — G0008 ADMIN INFLUENZA VIRUS VAC: HCPCS | Performed by: INTERNAL MEDICINE

## 2020-10-22 NOTE — PROGRESS NOTES
The ABCs of the Annual Wellness Visit  Initial Medicare Wellness Visit    Chief Complaint   Patient presents with   • Medicare Wellness-Initial Visit   • Prediabetes     A1C 5.4       Subjective   History of Present Illness:  Claudio Frausto is a 84 y.o. male who presents for an Initial Medicare Wellness Visit.    HEALTH RISK ASSESSMENT    Recent Hospitalizations:  No hospitalization(s) within the last year.    Current Medical Providers:  Patient Care Team:  Williams Crook MD as PCP - General  Williams Crook MD as PCP - Family Medicine  Rose Pascual MD as Consulting Physician (Dermatology)    Smoking Status:  Social History     Tobacco Use   Smoking Status Never Smoker   Smokeless Tobacco Never Used       Alcohol Consumption:  Social History     Substance and Sexual Activity   Alcohol Use No       Depression Screen:   PHQ-2/PHQ-9 Depression Screening 10/22/2020   Little interest or pleasure in doing things 1   Feeling down, depressed, or hopeless 1   Trouble falling or staying asleep, or sleeping too much 1   Feeling tired or having little energy 0   Poor appetite or overeating 0   Feeling bad about yourself - or that you are a failure or have let yourself or your family down 0   Trouble concentrating on things, such as reading the newspaper or watching television 0   Moving or speaking so slowly that other people could have noticed. Or the opposite - being so fidgety or restless that you have been moving around a lot more than usual 0   Thoughts that you would be better off dead, or of hurting yourself in some way 0   Total Score 3   If you checked off any problems, how difficult have these problems made it for you to do your work, take care of things at home, or get along with other people? Somewhat difficult       Fall Risk Screen:  STEADI Fall Risk Assessment was completed, and patient is at MODERATE risk for falls. Assessment completed on:10/22/2020    Health Habits and Functional and Cognitive  Screening:  No flowsheet data found.      Does the patient have evidence of cognitive impairment? No    Asprin use counseling:Taking ASA appropriately as indicated    Age-appropriate Screening Schedule:  Refer to the list below for future screening recommendations based on patient's age, sex and/or medical conditions. Orders for these recommended tests are listed in the plan section. The patient has been provided with a written plan.    Health Maintenance   Topic Date Due   • URINE MICROALBUMIN  1935   • ZOSTER VACCINE (1 of 2) 12/31/1987   • DIABETIC EYE EXAM  05/05/2017   • HEMOGLOBIN A1C  02/08/2019   • LIPID PANEL  10/21/2020   • TDAP/TD VACCINES (2 - Td) 01/25/2028   • INFLUENZA VACCINE  Completed          The following portions of the patient's history were reviewed and updated as appropriate: allergies, current medications, past family history, past medical history, past social history, past surgical history and problem list.    Outpatient Medications Prior to Visit   Medication Sig Dispense Refill   • amLODIPine (NORVASC) 10 MG tablet Take 10 mg by mouth 2 (Two) Times a Day.     • amLODIPine (NORVASC) 5 MG tablet TAKE 1 TABLET DAILY 90 tablet 3   • aspirin 81 MG EC tablet Take 81 mg by mouth 2 (Two) Times a Day.     • citalopram (CeleXA) 10 MG tablet TAKE 2 TABLETS EVERY       MORNING AND 1 TABLET EVERY EVENING 270 tablet 3   • citalopram (CeleXA) 20 MG tablet Take 20 mg by mouth 2 (Two) Times a Day. Pt takes 20mg in the am and 10mg  In evening     • donepezil (ARICEPT) 10 MG tablet Take 10 mg by mouth.     • hydroCHLOROthiazide (HYDRODIURIL) 12.5 MG tablet TAKE 1 TABLET DAILY 90 tablet 3   • ibuprofen (ADVIL,MOTRIN) 200 MG tablet Take  by mouth Daily.     • MethylPREDNISolone (MEDROL, GEORGE,) 4 MG tablet Take as directed on package instructions. 1 each 0   • metoprolol succinate XL (TOPROL-XL) 25 MG 24 hr tablet TAKE 1 TABLET DAILY 90 tablet 3   • metoprolol succinate XL (TOPROL-XL) 50 MG 24 hr tablet  "Take 50 mg by mouth.     • ramipril (ALTACE) 10 MG capsule Take 10 mg by mouth 2 (Two) Times a Day.       No facility-administered medications prior to visit.        There is no problem list on file for this patient.      Advanced Care Planning:  ACP discussion was held with the patient during this visit. Patient does not have an advance directive, information provided.    Review of Systems   Unable to perform ROS: Dementia       Compared to one year ago, the patient feels his physical health is the same.  Compared to one year ago, the patient feels his mental health is the same.    Reviewed chart for potential of high risk medication in the elderly: yes  Reviewed chart for potential of harmful drug interactions in the elderly:yes    Objective         Vitals:    10/22/20 0934   BP: 130/78   BP Location: Left arm   Patient Position: Sitting   Cuff Size: Adult   Pulse: 64   Temp: 97.3 °F (36.3 °C)   TempSrc: Temporal   SpO2: 98%   Weight: 87.4 kg (192 lb 9.6 oz)   Height: 175.3 cm (69\")   PainSc: 0-No pain       Body mass index is 28.44 kg/m².  Discussed the patient's BMI with him. The BMI is in the acceptable range.    Physical Exam  Vitals signs and nursing note reviewed.   Constitutional:       General: He is not in acute distress.     Appearance: Normal appearance. He is well-developed.   HENT:      Head: Normocephalic and atraumatic.      Right Ear: External ear normal.      Left Ear: External ear normal.      Nose: Nose normal.   Eyes:      Extraocular Movements: Extraocular movements intact.      Conjunctiva/sclera: Conjunctivae normal.      Pupils: Pupils are equal, round, and reactive to light.   Neck:      Musculoskeletal: Normal range of motion and neck supple. No neck rigidity or muscular tenderness.   Cardiovascular:      Rate and Rhythm: Normal rate and regular rhythm.      Pulses: Normal pulses.      Heart sounds: Normal heart sounds.   Pulmonary:      Effort: Pulmonary effort is normal.      Breath " sounds: Normal breath sounds.   Abdominal:      General: Bowel sounds are normal.      Palpations: Abdomen is soft.   Musculoskeletal: Normal range of motion.   Skin:     General: Skin is warm and dry.   Neurological:      General: No focal deficit present.      Mental Status: He is alert and oriented to person, place, and time.   Psychiatric:         Mood and Affect: Mood normal.         Behavior: Behavior normal.               Assessment/Plan   Medicare Risks and Personalized Health Plan  CMS Preventative Services Quick Reference  Advance Directive Discussion  Cardiovascular risk  Colon Cancer Screening  Immunizations Discussed/Encouraged (specific immunizations; Shingrix )  Prostate Cancer Screening     The above risks/problems have been discussed with the patient.  Pertinent information has been shared with the patient in the After Visit Summary.  Follow up plans and orders are seen below in the Assessment/Plan Section.    Diagnoses and all orders for this visit:    1. Type 2 diabetes mellitus with hyperglycemia, unspecified whether long term insulin use (CMS/[facility]) (Primary)  -     POC Glycosylated Hemoglobin (Hb A1C); Future  -     POC Glycosylated Hemoglobin (Hb A1C)    2. Hypothyroidism, unspecified type  -     CBC & Differential; Future  -     Comprehensive Metabolic Panel; Future  -     Urinalysis With Microscopic - Urine, Clean Catch; Future  -     TSH; Future  -     Lipid Panel; Future  -     Uric Acid; Future    3. Hypertension, unspecified type  -     CBC & Differential; Future  -     Comprehensive Metabolic Panel; Future  -     Urinalysis With Microscopic - Urine, Clean Catch; Future  -     TSH; Future  -     Lipid Panel; Future  -     Uric Acid; Future    4. Hyperlipidemia, unspecified hyperlipidemia type  -     CBC & Differential; Future  -     Comprehensive Metabolic Panel; Future  -     Urinalysis With Microscopic - Urine, Clean Catch; Future  -     TSH; Future  -     Lipid Panel; Future  -     Uric  Acid; Future      Follow Up:  No follow-ups on file.  Patient some memory loss is slowly progressive his wife is working with him and there are no major changes in his status or his medications.  I have asked him to continue medications we will do his lab work today to evaluate for other problems.  I have encouraged healthy lifestyle getting him up walking around getting him out and exposed to other situations unfortunately during the pandemic this is becoming increasingly difficult to do.    An After Visit Summary and PPPS were given to the patient.

## 2020-10-23 LAB
ALBUMIN SERPL-MCNC: 4.4 G/DL (ref 3.5–5.2)
ALBUMIN/GLOB SERPL: 1.8 G/DL
ALP SERPL-CCNC: 83 U/L (ref 39–117)
ALT SERPL-CCNC: 12 U/L (ref 1–41)
AST SERPL-CCNC: 14 U/L (ref 1–40)
BASOPHILS # BLD AUTO: 0.05 10*3/MM3 (ref 0–0.2)
BASOPHILS NFR BLD AUTO: 0.8 % (ref 0–1.5)
BILIRUB SERPL-MCNC: 0.4 MG/DL (ref 0–1.2)
BUN SERPL-MCNC: 11 MG/DL (ref 8–23)
BUN/CREAT SERPL: 11.2 (ref 7–25)
CALCIUM SERPL-MCNC: 9.7 MG/DL (ref 8.6–10.5)
CHLORIDE SERPL-SCNC: 100 MMOL/L (ref 98–107)
CHOLEST SERPL-MCNC: 171 MG/DL (ref 0–200)
CO2 SERPL-SCNC: 30.1 MMOL/L (ref 22–29)
CREAT SERPL-MCNC: 0.98 MG/DL (ref 0.76–1.27)
EOSINOPHIL # BLD AUTO: 0.23 10*3/MM3 (ref 0–0.4)
EOSINOPHIL NFR BLD AUTO: 3.6 % (ref 0.3–6.2)
ERYTHROCYTE [DISTWIDTH] IN BLOOD BY AUTOMATED COUNT: 12.9 % (ref 12.3–15.4)
GLOBULIN SER CALC-MCNC: 2.4 GM/DL
GLUCOSE SERPL-MCNC: 94 MG/DL (ref 65–99)
HCT VFR BLD AUTO: 47.2 % (ref 37.5–51)
HDLC SERPL-MCNC: 62 MG/DL (ref 40–60)
HGB BLD-MCNC: 15.3 G/DL (ref 13–17.7)
IMM GRANULOCYTES # BLD AUTO: 0.02 10*3/MM3 (ref 0–0.05)
IMM GRANULOCYTES NFR BLD AUTO: 0.3 % (ref 0–0.5)
LDLC SERPL CALC-MCNC: 92 MG/DL (ref 0–100)
LYMPHOCYTES # BLD AUTO: 1.56 10*3/MM3 (ref 0.7–3.1)
LYMPHOCYTES NFR BLD AUTO: 24.3 % (ref 19.6–45.3)
MCH RBC QN AUTO: 29 PG (ref 26.6–33)
MCHC RBC AUTO-ENTMCNC: 32.4 G/DL (ref 31.5–35.7)
MCV RBC AUTO: 89.4 FL (ref 79–97)
MONOCYTES # BLD AUTO: 0.56 10*3/MM3 (ref 0.1–0.9)
MONOCYTES NFR BLD AUTO: 8.7 % (ref 5–12)
NEUTROPHILS # BLD AUTO: 4.01 10*3/MM3 (ref 1.7–7)
NEUTROPHILS NFR BLD AUTO: 62.3 % (ref 42.7–76)
NRBC BLD AUTO-RTO: 0 /100 WBC (ref 0–0.2)
PLATELET # BLD AUTO: 249 10*3/MM3 (ref 140–450)
POTASSIUM SERPL-SCNC: 3.9 MMOL/L (ref 3.5–5.2)
PROT SERPL-MCNC: 6.8 G/DL (ref 6–8.5)
RBC # BLD AUTO: 5.28 10*6/MM3 (ref 4.14–5.8)
SODIUM SERPL-SCNC: 140 MMOL/L (ref 136–145)
TRIGL SERPL-MCNC: 93 MG/DL (ref 0–150)
TSH SERPL DL<=0.005 MIU/L-ACNC: 0.93 UIU/ML (ref 0.27–4.2)
URATE SERPL-MCNC: 6.3 MG/DL (ref 3.4–7)
VLDLC SERPL CALC-MCNC: 17 MG/DL (ref 5–40)
WBC # BLD AUTO: 6.43 10*3/MM3 (ref 3.4–10.8)

## 2021-01-05 RX ORDER — CITALOPRAM 10 MG/1
10 TABLET ORAL SEE ADMIN INSTRUCTIONS
Qty: 270 TABLET | Refills: 3 | Status: SHIPPED | OUTPATIENT
Start: 2021-01-05 | End: 2021-01-15 | Stop reason: SDUPTHER

## 2021-01-05 RX ORDER — MEMANTINE HYDROCHLORIDE AND DONEPEZIL HYDROCHLORIDE 28; 10 MG/1; MG/1
1 CAPSULE ORAL DAILY
Qty: 90 CAPSULE | Refills: 3 | Status: SHIPPED | OUTPATIENT
Start: 2021-01-05 | End: 2021-01-15 | Stop reason: SDUPTHER

## 2021-01-05 NOTE — TELEPHONE ENCOUNTER
Is Namzaric ok to fill? Medication is listed in [employer] for once daily. Patient was given #90 in January 2020 with 3 refills.

## 2021-01-05 NOTE — TELEPHONE ENCOUNTER
Needs refill on    citalopram (CeleXA) 10 MG tablet    namzaric  28-10 mg??      [facility] - [place], [place] - [address] - [phone]  - [phone] FX

## 2021-01-15 ENCOUNTER — TELEPHONE (OUTPATIENT)
Dept: INTERNAL MEDICINE | Facility: CLINIC | Age: 86
End: 2021-01-15

## 2021-01-15 RX ORDER — CITALOPRAM 10 MG/1
10 TABLET ORAL SEE ADMIN INSTRUCTIONS
Qty: 270 TABLET | Refills: 3 | Status: SHIPPED | OUTPATIENT
Start: 2021-01-15 | End: 2022-03-28

## 2021-01-15 RX ORDER — MEMANTINE HYDROCHLORIDE AND DONEPEZIL HYDROCHLORIDE 28; 10 MG/1; MG/1
1 CAPSULE ORAL DAILY
Qty: 90 CAPSULE | Refills: 3 | Status: SHIPPED | OUTPATIENT
Start: 2021-01-15 | End: 2021-10-31 | Stop reason: HOSPADM

## 2021-01-15 RX ORDER — CITALOPRAM 10 MG/1
10 TABLET ORAL SEE ADMIN INSTRUCTIONS
Qty: 270 TABLET | Refills: 3 | Status: CANCELLED | OUTPATIENT
Start: 2021-01-15

## 2021-01-15 RX ORDER — MEMANTINE HYDROCHLORIDE AND DONEPEZIL HYDROCHLORIDE 28; 10 MG/1; MG/1
1 CAPSULE ORAL DAILY
Qty: 90 CAPSULE | Refills: 3 | Status: CANCELLED | OUTPATIENT
Start: 2021-01-15

## 2021-01-20 ENCOUNTER — IMMUNIZATION (OUTPATIENT)
Age: 86
End: 2021-01-20
Payer: MEDICARE

## 2021-01-20 PROCEDURE — 0001A COVID-19, PFIZER VACCINE 30MCG/0.3ML DOSE: CPT | Performed by: FAMILY MEDICINE

## 2021-01-20 PROCEDURE — 91300 COVID-19, PFIZER VACCINE 30MCG/0.3ML DOSE: CPT | Performed by: FAMILY MEDICINE

## 2021-04-15 ENCOUNTER — OFFICE VISIT (OUTPATIENT)
Dept: INTERNAL MEDICINE | Facility: CLINIC | Age: 86
End: 2021-04-15

## 2021-04-15 VITALS
HEIGHT: 69 IN | OXYGEN SATURATION: 98 % | TEMPERATURE: 97.9 F | DIASTOLIC BLOOD PRESSURE: 70 MMHG | SYSTOLIC BLOOD PRESSURE: 140 MMHG | BODY MASS INDEX: 27.99 KG/M2 | WEIGHT: 189 LBS | HEART RATE: 50 BPM

## 2021-04-15 DIAGNOSIS — E11.9 ENCOUNTER FOR DIABETIC FOOT EXAM (HCC): Primary | ICD-10-CM

## 2021-04-15 DIAGNOSIS — R73.01 IMPAIRED FASTING BLOOD SUGAR: ICD-10-CM

## 2021-04-15 DIAGNOSIS — E03.9 HYPOTHYROIDISM, UNSPECIFIED TYPE: ICD-10-CM

## 2021-04-15 DIAGNOSIS — E78.5 HYPERLIPIDEMIA, UNSPECIFIED HYPERLIPIDEMIA TYPE: ICD-10-CM

## 2021-04-15 DIAGNOSIS — I10 ESSENTIAL HYPERTENSION: ICD-10-CM

## 2021-04-15 LAB — HBA1C MFR BLD: 5.3 %

## 2021-04-15 PROCEDURE — 83036 HEMOGLOBIN GLYCOSYLATED A1C: CPT | Performed by: INTERNAL MEDICINE

## 2021-04-15 PROCEDURE — 99214 OFFICE O/P EST MOD 30 MIN: CPT | Performed by: INTERNAL MEDICINE

## 2021-04-15 NOTE — PROGRESS NOTES
Subjective   Claudio Frausto is a 85 y.o. male.   Chief Complaint   Patient presents with   • Hypertension     FOLLOW UP   • Diabetes     A1C: 5.3   • Fatigue     ONGOING OVER THE LAST 6 MONTHS        History of Present Illness patient has hypertension diabetes or at least prediabetes.  Also some ongoing fatigue for several months.  He is here for follow-up of his memory disorder.    The following portions of the patient's history were reviewed and updated as appropriate: allergies, current medications, past family history, past medical history, past social history, past surgical history and problem list.    Review of Systems   Unable to perform ROS: Dementia       Objective   Past Medical History:   Diagnosis Date   • BCC (basal cell carcinoma)     right ear   • Cancer (CMS/HCC)     skin   • Hypertension    • Short-term memory loss    • Trigger finger    • Wears dentures    • Wears glasses       Past Surgical History:   Procedure Laterality Date   • LAPAROSCOPIC CHOLECYSTECTOMY     • TRIGGER FINGER RELEASE Left     and on right hand   • TRIGGER FINGER RELEASE Left 5/12/2017    Procedure: LEFT RING FINGER TRIGGER RELEASE;  Surgeon: Tyler Godwin MD;  Location: [facility] OR;  Service:         Current Outpatient Medications:   •  amLODIPine (NORVASC) 5 MG tablet, TAKE 1 TABLET DAILY, Disp: 90 tablet, Rfl: 3  •  aspirin 81 MG EC tablet, Take 81 mg by mouth 2 (Two) Times a Day., Disp: , Rfl:   •  citalopram (CeleXA) 10 MG tablet, Take 1 tablet by mouth See Admin Instructions. Take 2 tablets by mouth every morning and then take 1 tablet by mouth every evening, Disp: 270 tablet, Rfl: 3  •  hydroCHLOROthiazide (HYDRODIURIL) 12.5 MG tablet, TAKE 1 TABLET DAILY, Disp: 90 tablet, Rfl: 3  •  ibuprofen (ADVIL,MOTRIN) 200 MG tablet, Take  by mouth Daily., Disp: , Rfl:   •  Memantine HCl-Donepezil HCl (Namzaric) 28-10 MG capsule sustained-release 24 hr, Take 28 mg by mouth Daily., Disp: 90 capsule, Rfl: 3  •  metoprolol succinate XL  (TOPROL-XL) 25 MG 24 hr tablet, TAKE 1 TABLET DAILY, Disp: 90 tablet, Rfl: 3     Vitals:    04/15/21 1544   BP: 140/70   Pulse: 50   Temp: 97.9 °F (36.6 °C)   SpO2: 98%         04/15/21  1544   Weight: 85.7 kg (189 lb)     Patient's Body mass index is 27.91 kg/m². BMI is .      Physical Exam  Vitals and nursing note reviewed.   Constitutional:       General: He is not in acute distress.     Appearance: Normal appearance. He is well-developed.   HENT:      Head: Normocephalic and atraumatic.      Right Ear: External ear normal.      Left Ear: External ear normal.      Nose: Nose normal.   Eyes:      Extraocular Movements: Extraocular movements intact.      Conjunctiva/sclera: Conjunctivae normal.      Pupils: Pupils are equal, round, and reactive to light.   Cardiovascular:      Rate and Rhythm: Normal rate and regular rhythm.      Pulses: Normal pulses.      Heart sounds: Normal heart sounds.   Pulmonary:      Effort: Pulmonary effort is normal.      Breath sounds: Normal breath sounds.   Abdominal:      General: Bowel sounds are normal.      Palpations: Abdomen is soft.   Musculoskeletal:         General: Normal range of motion.      Cervical back: Normal range of motion and neck supple. No rigidity. No muscular tenderness.   Skin:     General: Skin is warm and dry.   Neurological:      General: No focal deficit present.      Mental Status: He is alert.      Comments: Patient is unable to answer many of his questions he is able to communicate with us his wife is present to fill in the gaps.   Psychiatric:         Mood and Affect: Mood normal.         Behavior: Behavior normal.               Assessment/Plan   Diagnoses and all orders for this visit:    1. Encounter for diabetic foot exam (CMS/HCC) (Primary)  -     POC Glycosylated Hemoglobin (Hb A1C)    2. Essential hypertension  -     Basic Metabolic Panel  -     Magnesium    3. Impaired fasting blood sugar    4. Hypothyroidism, unspecified type  -     TSH  -      T4, Free  -     T3, Free    5. Hyperlipidemia, unspecified hyperlipidemia type  -     CBC & Differential        Patient seems to be reasonably stable according to his wife no major changes though he is slightly more weak than usual.  I have hypothyroidism in his history he has not had his thyroid function checked recently were going to check that along with a CBC and some other chemistries.  Will be back in touch with him we can review his laboratory otherwise he is to continue his current medication

## 2021-04-16 LAB
BASOPHILS # BLD AUTO: 0.03 10*3/MM3 (ref 0–0.2)
BASOPHILS NFR BLD AUTO: 0.4 % (ref 0–1.5)
BUN SERPL-MCNC: 12 MG/DL (ref 8–23)
BUN/CREAT SERPL: 10.7 (ref 7–25)
CALCIUM SERPL-MCNC: 9.8 MG/DL (ref 8.6–10.5)
CHLORIDE SERPL-SCNC: 97 MMOL/L (ref 98–107)
CO2 SERPL-SCNC: 30.4 MMOL/L (ref 22–29)
CREAT SERPL-MCNC: 1.12 MG/DL (ref 0.76–1.27)
EOSINOPHIL # BLD AUTO: 0.06 10*3/MM3 (ref 0–0.4)
EOSINOPHIL NFR BLD AUTO: 0.8 % (ref 0.3–6.2)
ERYTHROCYTE [DISTWIDTH] IN BLOOD BY AUTOMATED COUNT: 13.1 % (ref 12.3–15.4)
GLUCOSE SERPL-MCNC: 101 MG/DL (ref 65–99)
HCT VFR BLD AUTO: 41.1 % (ref 37.5–51)
HGB BLD-MCNC: 13.8 G/DL (ref 13–17.7)
IMM GRANULOCYTES # BLD AUTO: 0.02 10*3/MM3 (ref 0–0.05)
IMM GRANULOCYTES NFR BLD AUTO: 0.3 % (ref 0–0.5)
LYMPHOCYTES # BLD AUTO: 1.06 10*3/MM3 (ref 0.7–3.1)
LYMPHOCYTES NFR BLD AUTO: 14.7 % (ref 19.6–45.3)
MAGNESIUM SERPL-MCNC: 2 MG/DL (ref 1.6–2.4)
MCH RBC QN AUTO: 29.6 PG (ref 26.6–33)
MCHC RBC AUTO-ENTMCNC: 33.6 G/DL (ref 31.5–35.7)
MCV RBC AUTO: 88 FL (ref 79–97)
MONOCYTES # BLD AUTO: 0.28 10*3/MM3 (ref 0.1–0.9)
MONOCYTES NFR BLD AUTO: 3.9 % (ref 5–12)
NEUTROPHILS # BLD AUTO: 5.78 10*3/MM3 (ref 1.7–7)
NEUTROPHILS NFR BLD AUTO: 79.9 % (ref 42.7–76)
NRBC BLD AUTO-RTO: 0 /100 WBC (ref 0–0.2)
PLATELET # BLD AUTO: 251 10*3/MM3 (ref 140–450)
POTASSIUM SERPL-SCNC: 4 MMOL/L (ref 3.5–5.2)
RBC # BLD AUTO: 4.67 10*6/MM3 (ref 4.14–5.8)
SODIUM SERPL-SCNC: 138 MMOL/L (ref 136–145)
T3FREE SERPL-MCNC: 2.3 PG/ML (ref 2–4.4)
T4 FREE SERPL-MCNC: 1.12 NG/DL (ref 0.93–1.7)
TSH SERPL DL<=0.005 MIU/L-ACNC: 1.22 UIU/ML (ref 0.27–4.2)
WBC # BLD AUTO: 7.23 10*3/MM3 (ref 3.4–10.8)

## 2021-04-20 ENCOUNTER — TELEPHONE (OUTPATIENT)
Dept: INTERNAL MEDICINE | Facility: CLINIC | Age: 86
End: 2021-04-20

## 2021-05-21 RX ORDER — METOPROLOL SUCCINATE 25 MG/1
TABLET, EXTENDED RELEASE ORAL
Qty: 90 TABLET | Refills: 3 | Status: SHIPPED | OUTPATIENT
Start: 2021-05-21 | End: 2021-10-31 | Stop reason: HOSPADM

## 2021-10-11 ENCOUNTER — LAB (OUTPATIENT)
Dept: LAB | Facility: HOSPITAL | Age: 86
End: 2021-10-11

## 2021-10-11 DIAGNOSIS — R73.01 IMPAIRED FASTING BLOOD SUGAR: Primary | ICD-10-CM

## 2021-10-11 DIAGNOSIS — E78.5 HYPERLIPIDEMIA, UNSPECIFIED HYPERLIPIDEMIA TYPE: ICD-10-CM

## 2021-10-11 DIAGNOSIS — E03.9 HYPOTHYROIDISM, UNSPECIFIED TYPE: ICD-10-CM

## 2021-10-11 DIAGNOSIS — Z79.899 ENCOUNTER FOR LONG-TERM CURRENT USE OF MEDICATION: ICD-10-CM

## 2021-10-11 DIAGNOSIS — E11.65 TYPE 2 DIABETES MELLITUS WITH HYPERGLYCEMIA, UNSPECIFIED WHETHER LONG TERM INSULIN USE (HCC): ICD-10-CM

## 2021-10-11 DIAGNOSIS — I10 HYPERTENSION, UNSPECIFIED TYPE: ICD-10-CM

## 2021-10-11 LAB
ALBUMIN SERPL-MCNC: 3.7 G/DL (ref 3.5–5)
ALBUMIN/GLOB SERPL: 1.2 G/DL (ref 1.1–2.5)
ALP SERPL-CCNC: 64 U/L (ref 24–120)
ALT SERPL W P-5'-P-CCNC: 19 U/L (ref 0–50)
ANION GAP SERPL CALCULATED.3IONS-SCNC: 5 MMOL/L (ref 4–13)
AST SERPL-CCNC: 28 U/L (ref 7–45)
AUTO MIXED CELLS #: 0.5 10*3/MM3 (ref 0.1–2.6)
AUTO MIXED CELLS %: 8.3 % (ref 0.1–24)
BILIRUB SERPL-MCNC: 0.5 MG/DL (ref 0.1–1)
BUN SERPL-MCNC: 15 MG/DL (ref 5–21)
BUN/CREAT SERPL: 14
CALCIUM SPEC-SCNC: 9.2 MG/DL (ref 8.4–10.4)
CHLORIDE SERPL-SCNC: 104 MMOL/L (ref 98–110)
CHOLEST SERPL-MCNC: 161 MG/DL (ref 130–200)
CO2 SERPL-SCNC: 29 MMOL/L (ref 24–31)
CREAT SERPL-MCNC: 1.07 MG/DL (ref 0.5–1.4)
ERYTHROCYTE [DISTWIDTH] IN BLOOD BY AUTOMATED COUNT: 12.6 % (ref 12.3–15.4)
GFR SERPL CREATININE-BSD FRML MDRD: 66 ML/MIN/1.73
GLOBULIN UR ELPH-MCNC: 3.2 GM/DL
GLUCOSE SERPL-MCNC: 95 MG/DL (ref 70–100)
HBA1C MFR BLD: 5.7 % (ref 4.8–5.9)
HCT VFR BLD AUTO: 38.4 % (ref 37.5–51)
HDLC SERPL-MCNC: 48 MG/DL
HGB BLD-MCNC: 12.6 G/DL (ref 13–17.7)
LDLC SERPL CALC-MCNC: 97 MG/DL (ref 0–99)
LDLC/HDLC SERPL: 2 {RATIO}
LYMPHOCYTES # BLD AUTO: 1.4 10*3/MM3 (ref 0.7–3.1)
LYMPHOCYTES NFR BLD AUTO: 23.2 % (ref 19.6–45.3)
MCH RBC QN AUTO: 28.6 PG (ref 26.6–33)
MCHC RBC AUTO-ENTMCNC: 32.8 G/DL (ref 31.5–35.7)
MCV RBC AUTO: 87.3 FL (ref 79–97)
NEUTROPHILS NFR BLD AUTO: 4.3 10*3/MM3 (ref 1.7–7)
NEUTROPHILS NFR BLD AUTO: 68.5 % (ref 42.7–76)
PLATELET # BLD AUTO: 251 10*3/MM3 (ref 140–450)
PMV BLD AUTO: 8.5 FL (ref 6–12)
POTASSIUM SERPL-SCNC: 3.6 MMOL/L (ref 3.5–5.3)
PROT SERPL-MCNC: 6.9 G/DL (ref 6.3–8.7)
RBC # BLD AUTO: 4.4 10*6/MM3 (ref 4.14–5.8)
SODIUM SERPL-SCNC: 138 MMOL/L (ref 135–145)
TRIGL SERPL-MCNC: 84 MG/DL (ref 0–149)
TSH SERPL DL<=0.05 MIU/L-ACNC: 0.97 UIU/ML (ref 0.27–4.2)
URATE SERPL-MCNC: 6.5 MG/DL (ref 3.5–8.5)
VLDLC SERPL-MCNC: 16 MG/DL (ref 5–40)
WBC # BLD AUTO: 6.2 10*3/MM3 (ref 3.4–10.8)

## 2021-10-11 PROCEDURE — 84443 ASSAY THYROID STIM HORMONE: CPT | Performed by: INTERNAL MEDICINE

## 2021-10-11 PROCEDURE — 83036 HEMOGLOBIN GLYCOSYLATED A1C: CPT | Performed by: INTERNAL MEDICINE

## 2021-10-11 PROCEDURE — 80053 COMPREHEN METABOLIC PANEL: CPT | Performed by: INTERNAL MEDICINE

## 2021-10-11 PROCEDURE — 80061 LIPID PANEL: CPT | Performed by: INTERNAL MEDICINE

## 2021-10-11 PROCEDURE — 85025 COMPLETE CBC W/AUTO DIFF WBC: CPT | Performed by: INTERNAL MEDICINE

## 2021-10-11 PROCEDURE — 36415 COLL VENOUS BLD VENIPUNCTURE: CPT | Performed by: INTERNAL MEDICINE

## 2021-10-11 PROCEDURE — 84550 ASSAY OF BLOOD/URIC ACID: CPT | Performed by: INTERNAL MEDICINE

## 2021-10-28 ENCOUNTER — OFFICE VISIT (OUTPATIENT)
Dept: INTERNAL MEDICINE | Facility: CLINIC | Age: 86
End: 2021-10-28

## 2021-10-28 VITALS
BODY MASS INDEX: 27.11 KG/M2 | OXYGEN SATURATION: 99 % | WEIGHT: 183 LBS | TEMPERATURE: 97.1 F | DIASTOLIC BLOOD PRESSURE: 80 MMHG | HEIGHT: 69 IN | SYSTOLIC BLOOD PRESSURE: 130 MMHG | HEART RATE: 59 BPM

## 2021-10-28 DIAGNOSIS — G30.1 LATE ONSET ALZHEIMER'S DEMENTIA WITHOUT BEHAVIORAL DISTURBANCE (HCC): Primary | ICD-10-CM

## 2021-10-28 DIAGNOSIS — R73.01 IMPAIRED FASTING BLOOD SUGAR: ICD-10-CM

## 2021-10-28 DIAGNOSIS — F02.80 LATE ONSET ALZHEIMER'S DEMENTIA WITHOUT BEHAVIORAL DISTURBANCE (HCC): Primary | ICD-10-CM

## 2021-10-28 DIAGNOSIS — E78.5 HYPERLIPIDEMIA, UNSPECIFIED HYPERLIPIDEMIA TYPE: ICD-10-CM

## 2021-10-28 PROCEDURE — G0439 PPPS, SUBSEQ VISIT: HCPCS | Performed by: INTERNAL MEDICINE

## 2021-10-28 PROCEDURE — 1170F FXNL STATUS ASSESSED: CPT | Performed by: INTERNAL MEDICINE

## 2021-10-28 PROCEDURE — 99214 OFFICE O/P EST MOD 30 MIN: CPT | Performed by: INTERNAL MEDICINE

## 2021-10-28 PROCEDURE — 1126F AMNT PAIN NOTED NONE PRSNT: CPT | Performed by: INTERNAL MEDICINE

## 2021-10-28 PROCEDURE — 1159F MED LIST DOCD IN RCRD: CPT | Performed by: INTERNAL MEDICINE

## 2021-10-28 RX ORDER — HYDROCHLOROTHIAZIDE 12.5 MG/1
12.5 TABLET ORAL DAILY
Qty: 90 TABLET | Refills: 3 | Status: SHIPPED | OUTPATIENT
Start: 2021-10-28 | End: 2021-10-31 | Stop reason: HOSPADM

## 2021-10-28 NOTE — PROGRESS NOTES
The ABCs of the Annual Wellness Visit  Subsequent Medicare Wellness Visit    Chief Complaint   Patient presents with   • Medicare Wellness-subsequent   • Vomiting     pt threw up last night    • Diarrhea     has had diarrhea for 3 days but has not taken anything.        Subjective    History of Present Illness:  Claudio Frausto is a 85 y.o. male who presents for a Subsequent Medicare Wellness Visit.    The following portions of the patient's history were reviewed and   updated as appropriate: allergies, current medications, past family history, past medical history, past social history, past surgical history and problem list.    Compared to one year ago, the patient feels his physical   health is worse.    Compared to one year ago, the patient feels his mental   health is worse.    Recent Hospitalizations:  He was not admitted to the hospital during the last year.       Current Medical Providers:  Patient Care Team:  Williams Crook MD as PCP - General  Williams Crook MD as PCP - Family Medicine  Rose Pascual MD as Consulting Physician (Dermatology)    Outpatient Medications Prior to Visit   Medication Sig Dispense Refill   • amLODIPine (NORVASC) 5 MG tablet TAKE 1 TABLET DAILY 90 tablet 3   • citalopram (CeleXA) 10 MG tablet Take 1 tablet by mouth See Admin Instructions. Take 2 tablets by mouth every morning and then take 1 tablet by mouth every evening 270 tablet 3   • hydroCHLOROthiazide (HYDRODIURIL) 12.5 MG tablet TAKE 1 TABLET DAILY 90 tablet 3   • ibuprofen (ADVIL,MOTRIN) 200 MG tablet Take  by mouth Daily.     • Memantine HCl-Donepezil HCl (Namzaric) 28-10 MG capsule sustained-release 24 hr Take 28 mg by mouth Daily. 90 capsule 3   • metoprolol succinate XL (TOPROL-XL) 25 MG 24 hr tablet TAKE 1 TABLET DAILY 90 tablet 3   • aspirin 81 MG EC tablet Take 81 mg by mouth 2 (Two) Times a Day.       No facility-administered medications prior to visit.       No opioid medication identified on active  "medication list. I have reviewed chart for other potential  high risk medication/s and harmful drug interactions in the elderly.          Aspirin is not on active medication list.  Aspirin use is not indicated based on review of current medical condition/s. Risk of harm outweighs potential benefits.  .    Patient Active Problem List   Diagnosis   • Actinic keratoses   • Anal fissure   • Hypertension   • Benign prostate hyperplasia   • Excessive sleepiness   • Impaired fasting blood sugar   • Memory loss   • PFO (patent foramen ovale)   • Trigger finger, left middle finger   • Type 2 diabetes mellitus with hyperglycemia (HCC)   • Hypothyroidism   • Hyperlipidemia     Advance Care Planning  Advance Directive is not on file.  ACP discussion was held with the patient during this visit. Patient has an advance directive (not in EMR), copy requested.    Review of Systems   Unable to perform ROS: Dementia   Constitutional: Negative for appetite change, chills and fever.   HENT: Negative for congestion and ear pain.    Eyes: Negative for pain and discharge.   Respiratory: Negative for cough, chest tightness and shortness of breath.    Cardiovascular: Negative for chest pain, palpitations and leg swelling.   Gastrointestinal: Negative for abdominal distention and abdominal pain.   Endocrine: Negative for cold intolerance.   Genitourinary: Negative for difficulty urinating, dysuria and flank pain.   Musculoskeletal: Negative for arthralgias, back pain and gait problem.   Skin: Negative for color change and rash.   Neurological: Negative for dizziness and seizures.   Psychiatric/Behavioral: Negative for agitation, decreased concentration and dysphoric mood.        Objective    Vitals:    10/28/21 1454   BP: 130/80   BP Location: Left arm   Patient Position: Sitting   Cuff Size: Adult   Pulse: 59   Temp: 97.1 °F (36.2 °C)   TempSrc: Temporal   SpO2: 99%   Weight: 83 kg (183 lb)   Height: 175.3 cm (69\")   PainSc: 0-No pain "     BMI Readings from Last 1 Encounters:   10/28/21 27.02 kg/m²   BMI is above normal parameters. Recommendations include: exercise counseling    Does the patient have evidence of cognitive impairment? Yes    Physical Exam  Vitals and nursing note reviewed.   Constitutional:       General: He is not in acute distress.     Appearance: Normal appearance. He is well-developed.   HENT:      Head: Normocephalic and atraumatic.      Right Ear: External ear normal.      Left Ear: External ear normal.      Nose: Nose normal.   Eyes:      Extraocular Movements: Extraocular movements intact.      Conjunctiva/sclera: Conjunctivae normal.      Pupils: Pupils are equal, round, and reactive to light.   Cardiovascular:      Rate and Rhythm: Normal rate and regular rhythm.      Pulses: Normal pulses.      Heart sounds: Normal heart sounds.   Pulmonary:      Effort: Pulmonary effort is normal.      Breath sounds: Normal breath sounds.   Abdominal:      General: Bowel sounds are normal.      Palpations: Abdomen is soft.   Musculoskeletal:         General: Normal range of motion.      Cervical back: Normal range of motion and neck supple. No rigidity. No muscular tenderness.   Skin:     General: Skin is warm and dry.   Neurological:      General: No focal deficit present.      Mental Status: He is alert. He is disoriented.   Psychiatric:         Mood and Affect: Mood normal.         Behavior: Behavior normal.       Lab Results   Component Value Date    TRIG 84 10/11/2021    HDL 48 10/11/2021    LDL 97 10/11/2021    VLDL 16 10/11/2021    HGBA1C 5.7 10/11/2021            HEALTH RISK ASSESSMENT    Smoking Status:  Social History     Tobacco Use   Smoking Status Never Smoker   Smokeless Tobacco Never Used     Alcohol Consumption:  Social History     Substance and Sexual Activity   Alcohol Use No     Fall Risk Screen:    STEADI Fall Risk Assessment was completed, and patient is at MODERATE risk for falls. Assessment completed  on:10/28/2021    Depression Screening:  PHQ-2/PHQ-9 Depression Screening 10/28/2021   Little interest or pleasure in doing things 0   Feeling down, depressed, or hopeless 0   Trouble falling or staying asleep, or sleeping too much -   Feeling tired or having little energy -   Poor appetite or overeating -   Feeling bad about yourself - or that you are a failure or have let yourself or your family down -   Trouble concentrating on things, such as reading the newspaper or watching television -   Moving or speaking so slowly that other people could have noticed. Or the opposite - being so fidgety or restless that you have been moving around a lot more than usual -   Thoughts that you would be better off dead, or of hurting yourself in some way -   Total Score 0   If you checked off any problems, how difficult have these problems made it for you to do your work, take care of things at home, or get along with other people? -       Health Habits and Functional and Cognitive Screening:  Functional & Cognitive Status 10/28/2021   Do you have difficulty preparing food and eating? No   Do you have difficulty bathing yourself, getting dressed or grooming yourself? No   Do you have difficulty using the toilet? No   Do you have difficulty moving around from place to place? No   Do you have trouble with steps or getting out of a bed or a chair? No   Current Diet Well Balanced Diet   Dental Exam Up to date   Eye Exam Up to date   Exercise (times per week) 0 times per week   Current Exercises Include No Regular Exercise   Do you need help using the phone?  No   Are you deaf or do you have serious difficulty hearing?  Yes   Do you need help with transportation? No   Do you need help shopping? No   Do you need help preparing meals?  No   Do you need help with housework?  No   Do you need help with laundry? No   Do you need help taking your medications? No   Do you need help managing money? No   Do you ever drive or ride in a car  without wearing a seat belt? No   Have you felt unusual stress, anger or loneliness in the last month? No   Who do you live with? Spouse   If you need help, do you have trouble finding someone available to you? No   Have you been bothered in the last four weeks by sexual problems? No   Do you have difficulty concentrating, remembering or making decisions? No       Age-appropriate Screening Schedule:  Refer to the list below for future screening recommendations based on patient's age, sex and/or medical conditions. Orders for these recommended tests are listed in the plan section. The patient has been provided with a written plan.    Health Maintenance   Topic Date Due   • URINE MICROALBUMIN  Never done   • ZOSTER VACCINE (1 of 2) Never done   • INFLUENZA VACCINE  08/01/2021   • DIABETIC EYE EXAM  03/15/2022   • HEMOGLOBIN A1C  04/11/2022   • LIPID PANEL  10/11/2022   • TDAP/TD VACCINES (2 - Td or Tdap) 01/25/2028              Assessment/Plan   CMS Preventative Services Quick Reference  Risk Factors Identified During Encounter  Immunizations Discussed/Encouraged (specific Immunizations; Tdap, Influenza, Pneumococcal 23, Shingrix and COVID19  The above risks/problems have been discussed with the patient.  Follow up actions/plans if indicated are seen below in the Assessment/Plan Section.  Pertinent information has been shared with the patient in the After Visit Summary.    Diagnoses and all orders for this visit:    1. Late onset Alzheimer's dementia without behavioral disturbance (HCC) (Primary)    2. Impaired fasting blood sugar    3. Hyperlipidemia, unspecified hyperlipidemia type        Follow Up:   Return in about 6 months (around 4/28/2022).     An After Visit Summary and PPPS were made available to the patient.    At today's visit I advised patient's wife to get plugged in with the Alzheimer's support group.  She is also asking for some physical therapy due to balance and need for exercise for patient I told  her would set up home health for that.

## 2021-10-30 ENCOUNTER — APPOINTMENT (OUTPATIENT)
Dept: CT IMAGING | Facility: HOSPITAL | Age: 86
End: 2021-10-30

## 2021-10-30 ENCOUNTER — APPOINTMENT (OUTPATIENT)
Dept: GENERAL RADIOLOGY | Facility: HOSPITAL | Age: 86
End: 2021-10-30

## 2021-10-30 ENCOUNTER — HOSPITAL ENCOUNTER (OUTPATIENT)
Facility: HOSPITAL | Age: 86
Setting detail: OBSERVATION
Discharge: HOME | End: 2021-10-31
Attending: FAMILY MEDICINE | Admitting: FAMILY MEDICINE

## 2021-10-30 ENCOUNTER — APPOINTMENT (OUTPATIENT)
Dept: MRI IMAGING | Facility: HOSPITAL | Age: 86
End: 2021-10-30

## 2021-10-30 DIAGNOSIS — R55 SYNCOPE, UNSPECIFIED SYNCOPE TYPE: Primary | ICD-10-CM

## 2021-10-30 PROBLEM — F03.90 DEMENTIA WITHOUT BEHAVIORAL DISTURBANCE: Status: ACTIVE | Noted: 2021-10-30

## 2021-10-30 LAB
ALBUMIN SERPL-MCNC: 4.1 G/DL (ref 3.5–5.2)
ALBUMIN/GLOB SERPL: 1.5 G/DL
ALP SERPL-CCNC: 69 U/L (ref 39–117)
ALT SERPL W P-5'-P-CCNC: 11 U/L (ref 1–41)
ANION GAP SERPL CALCULATED.3IONS-SCNC: 8 MMOL/L (ref 5–15)
AST SERPL-CCNC: 15 U/L (ref 1–40)
BASOPHILS # BLD AUTO: 0.03 10*3/MM3 (ref 0–0.2)
BASOPHILS NFR BLD AUTO: 0.5 % (ref 0–1.5)
BILIRUB SERPL-MCNC: 0.5 MG/DL (ref 0–1.2)
BILIRUB UR QL STRIP: NEGATIVE
BUN SERPL-MCNC: 11 MG/DL (ref 8–23)
BUN/CREAT SERPL: 10.5 (ref 7–25)
CALCIUM SPEC-SCNC: 9 MG/DL (ref 8.6–10.5)
CHLORIDE SERPL-SCNC: 102 MMOL/L (ref 98–107)
CLARITY UR: CLEAR
CO2 SERPL-SCNC: 30 MMOL/L (ref 22–29)
COLOR UR: YELLOW
CREAT SERPL-MCNC: 1.05 MG/DL (ref 0.76–1.27)
D DIMER PPP FEU-MCNC: 1.38 MG/L (FEU) (ref 0–0.5)
DEPRECATED RDW RBC AUTO: 41.5 FL (ref 37–54)
EOSINOPHIL # BLD AUTO: 0.14 10*3/MM3 (ref 0–0.4)
EOSINOPHIL NFR BLD AUTO: 2.3 % (ref 0.3–6.2)
ERYTHROCYTE [DISTWIDTH] IN BLOOD BY AUTOMATED COUNT: 12.8 % (ref 12.3–15.4)
GFR SERPL CREATININE-BSD FRML MDRD: 67 ML/MIN/1.73
GLOBULIN UR ELPH-MCNC: 2.7 GM/DL
GLUCOSE SERPL-MCNC: 120 MG/DL (ref 65–99)
GLUCOSE UR STRIP-MCNC: NEGATIVE MG/DL
HCT VFR BLD AUTO: 44.1 % (ref 37.5–51)
HGB BLD-MCNC: 14.2 G/DL (ref 13–17.7)
HGB UR QL STRIP.AUTO: NEGATIVE
HOLD SPECIMEN: NORMAL
HOLD SPECIMEN: NORMAL
IMM GRANULOCYTES # BLD AUTO: 0.04 10*3/MM3 (ref 0–0.05)
IMM GRANULOCYTES NFR BLD AUTO: 0.6 % (ref 0–0.5)
INR PPP: 1.01 (ref 0.91–1.09)
KETONES UR QL STRIP: NEGATIVE
LEUKOCYTE ESTERASE UR QL STRIP.AUTO: NEGATIVE
LYMPHOCYTES # BLD AUTO: 1.65 10*3/MM3 (ref 0.7–3.1)
LYMPHOCYTES NFR BLD AUTO: 26.7 % (ref 19.6–45.3)
MAGNESIUM SERPL-MCNC: 2 MG/DL (ref 1.6–2.4)
MCH RBC QN AUTO: 28.5 PG (ref 26.6–33)
MCHC RBC AUTO-ENTMCNC: 32.2 G/DL (ref 31.5–35.7)
MCV RBC AUTO: 88.6 FL (ref 79–97)
MONOCYTES # BLD AUTO: 0.28 10*3/MM3 (ref 0.1–0.9)
MONOCYTES NFR BLD AUTO: 4.5 % (ref 5–12)
NEUTROPHILS NFR BLD AUTO: 4.04 10*3/MM3 (ref 1.7–7)
NEUTROPHILS NFR BLD AUTO: 65.4 % (ref 42.7–76)
NITRITE UR QL STRIP: NEGATIVE
NRBC BLD AUTO-RTO: 0 /100 WBC (ref 0–0.2)
PH UR STRIP.AUTO: 7 [PH] (ref 5–8)
PLATELET # BLD AUTO: 253 10*3/MM3 (ref 140–450)
PMV BLD AUTO: 9.1 FL (ref 6–12)
POTASSIUM SERPL-SCNC: 3.5 MMOL/L (ref 3.5–5.2)
PROT SERPL-MCNC: 6.8 G/DL (ref 6–8.5)
PROT UR QL STRIP: NEGATIVE
PROTHROMBIN TIME: 12.9 SECONDS (ref 11.9–14.6)
RBC # BLD AUTO: 4.98 10*6/MM3 (ref 4.14–5.8)
SARS-COV-2 RNA PNL SPEC NAA+PROBE: NOT DETECTED
SODIUM SERPL-SCNC: 140 MMOL/L (ref 136–145)
SP GR UR STRIP: 1.03 (ref 1–1.03)
TROPONIN T SERPL-MCNC: <0.01 NG/ML (ref 0–0.03)
TROPONIN T SERPL-MCNC: <0.01 NG/ML (ref 0–0.03)
UROBILINOGEN UR QL STRIP: NORMAL
WBC # BLD AUTO: 6.18 10*3/MM3 (ref 3.4–10.8)
WHOLE BLOOD HOLD SPECIMEN: NORMAL
WHOLE BLOOD HOLD SPECIMEN: NORMAL

## 2021-10-30 PROCEDURE — 70551 MRI BRAIN STEM W/O DYE: CPT

## 2021-10-30 PROCEDURE — 93005 ELECTROCARDIOGRAM TRACING: CPT

## 2021-10-30 PROCEDURE — G0378 HOSPITAL OBSERVATION PER HR: HCPCS

## 2021-10-30 PROCEDURE — 84484 ASSAY OF TROPONIN QUANT: CPT | Performed by: EMERGENCY MEDICINE

## 2021-10-30 PROCEDURE — 84484 ASSAY OF TROPONIN QUANT: CPT | Performed by: FAMILY MEDICINE

## 2021-10-30 PROCEDURE — 81003 URINALYSIS AUTO W/O SCOPE: CPT | Performed by: FAMILY MEDICINE

## 2021-10-30 PROCEDURE — 36415 COLL VENOUS BLD VENIPUNCTURE: CPT

## 2021-10-30 PROCEDURE — 87635 SARS-COV-2 COVID-19 AMP PRB: CPT | Performed by: FAMILY MEDICINE

## 2021-10-30 PROCEDURE — 85025 COMPLETE CBC W/AUTO DIFF WBC: CPT | Performed by: EMERGENCY MEDICINE

## 2021-10-30 PROCEDURE — 99285 EMERGENCY DEPT VISIT HI MDM: CPT

## 2021-10-30 PROCEDURE — 93010 ELECTROCARDIOGRAM REPORT: CPT | Performed by: EMERGENCY MEDICINE

## 2021-10-30 PROCEDURE — C9803 HOPD COVID-19 SPEC COLLECT: HCPCS

## 2021-10-30 PROCEDURE — 85379 FIBRIN DEGRADATION QUANT: CPT | Performed by: FAMILY MEDICINE

## 2021-10-30 PROCEDURE — 85610 PROTHROMBIN TIME: CPT | Performed by: EMERGENCY MEDICINE

## 2021-10-30 PROCEDURE — 71275 CT ANGIOGRAPHY CHEST: CPT

## 2021-10-30 PROCEDURE — 80053 COMPREHEN METABOLIC PANEL: CPT | Performed by: EMERGENCY MEDICINE

## 2021-10-30 PROCEDURE — 71045 X-RAY EXAM CHEST 1 VIEW: CPT

## 2021-10-30 PROCEDURE — 72125 CT NECK SPINE W/O DYE: CPT

## 2021-10-30 PROCEDURE — 83735 ASSAY OF MAGNESIUM: CPT | Performed by: FAMILY MEDICINE

## 2021-10-30 PROCEDURE — 93005 ELECTROCARDIOGRAM TRACING: CPT | Performed by: FAMILY MEDICINE

## 2021-10-30 PROCEDURE — 70450 CT HEAD/BRAIN W/O DYE: CPT

## 2021-10-30 PROCEDURE — 0 IOPAMIDOL PER 1 ML: Performed by: FAMILY MEDICINE

## 2021-10-30 RX ORDER — SODIUM CHLORIDE 0.9 % (FLUSH) 0.9 %
10 SYRINGE (ML) INJECTION EVERY 12 HOURS SCHEDULED
Status: DISCONTINUED | OUTPATIENT
Start: 2021-10-30 | End: 2021-10-31 | Stop reason: HOSPADM

## 2021-10-30 RX ORDER — METOPROLOL SUCCINATE 25 MG/1
25 TABLET, EXTENDED RELEASE ORAL DAILY
Status: DISCONTINUED | OUTPATIENT
Start: 2021-10-30 | End: 2021-10-30

## 2021-10-30 RX ORDER — ONDANSETRON 2 MG/ML
4 INJECTION INTRAMUSCULAR; INTRAVENOUS EVERY 6 HOURS PRN
Status: DISCONTINUED | OUTPATIENT
Start: 2021-10-30 | End: 2021-10-31 | Stop reason: HOSPADM

## 2021-10-30 RX ORDER — POLYETHYLENE GLYCOL 3350 17 G/17G
17 POWDER, FOR SOLUTION ORAL DAILY PRN
Status: DISCONTINUED | OUTPATIENT
Start: 2021-10-30 | End: 2021-10-31 | Stop reason: HOSPADM

## 2021-10-30 RX ORDER — AMOXICILLIN 250 MG
2 CAPSULE ORAL 2 TIMES DAILY
Status: DISCONTINUED | OUTPATIENT
Start: 2021-10-30 | End: 2021-10-31 | Stop reason: HOSPADM

## 2021-10-30 RX ORDER — CITALOPRAM 10 MG/1
10 TABLET ORAL DAILY
Status: DISCONTINUED | OUTPATIENT
Start: 2021-10-30 | End: 2021-10-31 | Stop reason: HOSPADM

## 2021-10-30 RX ORDER — ACETAMINOPHEN 325 MG/1
650 TABLET ORAL EVERY 4 HOURS PRN
Status: DISCONTINUED | OUTPATIENT
Start: 2021-10-30 | End: 2021-10-31 | Stop reason: HOSPADM

## 2021-10-30 RX ORDER — BISACODYL 5 MG/1
5 TABLET, DELAYED RELEASE ORAL DAILY PRN
Status: DISCONTINUED | OUTPATIENT
Start: 2021-10-30 | End: 2021-10-31 | Stop reason: HOSPADM

## 2021-10-30 RX ORDER — ALUMINA, MAGNESIA, AND SIMETHICONE 2400; 2400; 240 MG/30ML; MG/30ML; MG/30ML
15 SUSPENSION ORAL EVERY 6 HOURS PRN
Status: DISCONTINUED | OUTPATIENT
Start: 2021-10-30 | End: 2021-10-31 | Stop reason: HOSPADM

## 2021-10-30 RX ORDER — CITALOPRAM 10 MG/1
10 TABLET ORAL SEE ADMIN INSTRUCTIONS
Status: DISCONTINUED | OUTPATIENT
Start: 2021-10-30 | End: 2021-10-30

## 2021-10-30 RX ORDER — BISACODYL 10 MG
10 SUPPOSITORY, RECTAL RECTAL DAILY PRN
Status: DISCONTINUED | OUTPATIENT
Start: 2021-10-30 | End: 2021-10-31 | Stop reason: HOSPADM

## 2021-10-30 RX ORDER — SODIUM CHLORIDE 0.9 % (FLUSH) 0.9 %
10 SYRINGE (ML) INJECTION AS NEEDED
Status: DISCONTINUED | OUTPATIENT
Start: 2021-10-30 | End: 2021-10-31 | Stop reason: HOSPADM

## 2021-10-30 RX ORDER — AMLODIPINE BESYLATE 5 MG/1
5 TABLET ORAL DAILY
Status: DISCONTINUED | OUTPATIENT
Start: 2021-10-30 | End: 2021-10-31 | Stop reason: HOSPADM

## 2021-10-30 RX ADMIN — AMLODIPINE BESYLATE 5 MG: 5 TABLET ORAL at 20:10

## 2021-10-30 RX ADMIN — IOPAMIDOL 100 ML: 755 INJECTION, SOLUTION INTRAVENOUS at 12:42

## 2021-10-30 RX ADMIN — DOCUSATE SODIUM 50 MG AND SENNOSIDES 8.6 MG 2 TABLET: 8.6; 5 TABLET, FILM COATED ORAL at 20:10

## 2021-10-30 RX ADMIN — CITALOPRAM 10 MG: 10 TABLET, FILM COATED ORAL at 20:10

## 2021-10-31 ENCOUNTER — APPOINTMENT (OUTPATIENT)
Dept: CARDIOLOGY | Facility: HOSPITAL | Age: 86
End: 2021-10-31

## 2021-10-31 ENCOUNTER — READMISSION MANAGEMENT (OUTPATIENT)
Dept: CALL CENTER | Facility: HOSPITAL | Age: 86
End: 2021-10-31

## 2021-10-31 VITALS
HEIGHT: 69 IN | WEIGHT: 180 LBS | SYSTOLIC BLOOD PRESSURE: 111 MMHG | OXYGEN SATURATION: 96 % | RESPIRATION RATE: 19 BRPM | DIASTOLIC BLOOD PRESSURE: 69 MMHG | TEMPERATURE: 98.3 F | BODY MASS INDEX: 26.66 KG/M2 | HEART RATE: 61 BPM

## 2021-10-31 LAB
ALBUMIN SERPL-MCNC: 4 G/DL (ref 3.5–5.2)
ALBUMIN/GLOB SERPL: 1.5 G/DL
ALP SERPL-CCNC: 66 U/L (ref 39–117)
ALT SERPL W P-5'-P-CCNC: 7 U/L (ref 1–41)
ANION GAP SERPL CALCULATED.3IONS-SCNC: 8 MMOL/L (ref 5–15)
AST SERPL-CCNC: 16 U/L (ref 1–40)
BASOPHILS # BLD AUTO: 0.04 10*3/MM3 (ref 0–0.2)
BASOPHILS NFR BLD AUTO: 0.5 % (ref 0–1.5)
BH CV ECHO MEAS - AO MAX PG (FULL): 10.4 MMHG
BH CV ECHO MEAS - AO MAX PG: 12 MMHG
BH CV ECHO MEAS - AO MEAN PG (FULL): 6 MMHG
BH CV ECHO MEAS - AO MEAN PG: 7 MMHG
BH CV ECHO MEAS - AO ROOT AREA (BSA CORRECTED): 1.7
BH CV ECHO MEAS - AO ROOT AREA: 8.6 CM^2
BH CV ECHO MEAS - AO ROOT DIAM: 3.3 CM
BH CV ECHO MEAS - AO V2 MAX: 173 CM/SEC
BH CV ECHO MEAS - AO V2 MEAN: 119 CM/SEC
BH CV ECHO MEAS - AO V2 VTI: 36.7 CM
BH CV ECHO MEAS - AVA(I,A): 1.3 CM^2
BH CV ECHO MEAS - AVA(I,D): 1.3 CM^2
BH CV ECHO MEAS - AVA(V,A): 1.2 CM^2
BH CV ECHO MEAS - AVA(V,D): 1.2 CM^2
BH CV ECHO MEAS - BSA(HAYCOCK): 2 M^2
BH CV ECHO MEAS - BSA: 2 M^2
BH CV ECHO MEAS - BZI_BMI: 26.6 KILOGRAMS/M^2
BH CV ECHO MEAS - BZI_METRIC_HEIGHT: 175.3 CM
BH CV ECHO MEAS - BZI_METRIC_WEIGHT: 81.6 KG
BH CV ECHO MEAS - EDV(CUBED): 101.2 ML
BH CV ECHO MEAS - EDV(MOD-SP4): 108 ML
BH CV ECHO MEAS - EDV(TEICH): 100.3 ML
BH CV ECHO MEAS - EF(CUBED): 78.1 %
BH CV ECHO MEAS - EF(MOD-SP4): 65 %
BH CV ECHO MEAS - EF(TEICH): 70.3 %
BH CV ECHO MEAS - ESV(CUBED): 22.2 ML
BH CV ECHO MEAS - ESV(MOD-SP4): 37.8 ML
BH CV ECHO MEAS - ESV(TEICH): 29.8 ML
BH CV ECHO MEAS - FS: 39.7 %
BH CV ECHO MEAS - IVS/LVPW: 1
BH CV ECHO MEAS - IVSD: 1.1 CM
BH CV ECHO MEAS - LA DIMENSION: 3.1 CM
BH CV ECHO MEAS - LA/AO: 0.94
BH CV ECHO MEAS - LAT PEAK E' VEL: 11.1 CM/SEC
BH CV ECHO MEAS - LV DIASTOLIC VOL/BSA (35-75): 54.7 ML/M^2
BH CV ECHO MEAS - LV MASS(C)D: 170 GRAMS
BH CV ECHO MEAS - LV MASS(C)DI: 86.1 GRAMS/M^2
BH CV ECHO MEAS - LV MAX PG: 1.6 MMHG
BH CV ECHO MEAS - LV MEAN PG: 1 MMHG
BH CV ECHO MEAS - LV SYSTOLIC VOL/BSA (12-30): 19.1 ML/M^2
BH CV ECHO MEAS - LV V1 MAX: 63.5 CM/SEC
BH CV ECHO MEAS - LV V1 MEAN: 46.9 CM/SEC
BH CV ECHO MEAS - LV V1 VTI: 15.7 CM
BH CV ECHO MEAS - LVIDD: 4.7 CM
BH CV ECHO MEAS - LVIDS: 2.8 CM
BH CV ECHO MEAS - LVLD AP4: 8.5 CM
BH CV ECHO MEAS - LVLS AP4: 6 CM
BH CV ECHO MEAS - LVOT AREA (M): 3.1 CM^2
BH CV ECHO MEAS - LVOT AREA: 3.1 CM^2
BH CV ECHO MEAS - LVOT DIAM: 2 CM
BH CV ECHO MEAS - LVPWD: 1 CM
BH CV ECHO MEAS - MED PEAK E' VEL: 5.22 CM/SEC
BH CV ECHO MEAS - MV A MAX VEL: 55.3 CM/SEC
BH CV ECHO MEAS - MV DEC TIME: 0.29 SEC
BH CV ECHO MEAS - MV E MAX VEL: 42.2 CM/SEC
BH CV ECHO MEAS - MV E/A: 0.76
BH CV ECHO MEAS - SI(AO): 158.9 ML/M^2
BH CV ECHO MEAS - SI(CUBED): 40 ML/M^2
BH CV ECHO MEAS - SI(LVOT): 25 ML/M^2
BH CV ECHO MEAS - SI(MOD-SP4): 35.5 ML/M^2
BH CV ECHO MEAS - SI(TEICH): 35.7 ML/M^2
BH CV ECHO MEAS - SV(AO): 313.9 ML
BH CV ECHO MEAS - SV(CUBED): 79 ML
BH CV ECHO MEAS - SV(LVOT): 49.3 ML
BH CV ECHO MEAS - SV(MOD-SP4): 70.2 ML
BH CV ECHO MEAS - SV(TEICH): 70.5 ML
BH CV ECHO MEASUREMENTS AVERAGE E/E' RATIO: 5.17
BILIRUB SERPL-MCNC: 0.6 MG/DL (ref 0–1.2)
BUN SERPL-MCNC: 10 MG/DL (ref 8–23)
BUN/CREAT SERPL: 10.3 (ref 7–25)
CALCIUM SPEC-SCNC: 9 MG/DL (ref 8.6–10.5)
CHLORIDE SERPL-SCNC: 102 MMOL/L (ref 98–107)
CO2 SERPL-SCNC: 30 MMOL/L (ref 22–29)
CREAT SERPL-MCNC: 0.97 MG/DL (ref 0.76–1.27)
DEPRECATED RDW RBC AUTO: 41.4 FL (ref 37–54)
EOSINOPHIL # BLD AUTO: 0.15 10*3/MM3 (ref 0–0.4)
EOSINOPHIL NFR BLD AUTO: 2.1 % (ref 0.3–6.2)
ERYTHROCYTE [DISTWIDTH] IN BLOOD BY AUTOMATED COUNT: 12.7 % (ref 12.3–15.4)
GFR SERPL CREATININE-BSD FRML MDRD: 74 ML/MIN/1.73
GLOBULIN UR ELPH-MCNC: 2.6 GM/DL
GLUCOSE SERPL-MCNC: 119 MG/DL (ref 65–99)
HCT VFR BLD AUTO: 42.9 % (ref 37.5–51)
HGB BLD-MCNC: 13.9 G/DL (ref 13–17.7)
IMM GRANULOCYTES # BLD AUTO: 0.03 10*3/MM3 (ref 0–0.05)
IMM GRANULOCYTES NFR BLD AUTO: 0.4 % (ref 0–0.5)
LYMPHOCYTES # BLD AUTO: 2.05 10*3/MM3 (ref 0.7–3.1)
LYMPHOCYTES NFR BLD AUTO: 28.1 % (ref 19.6–45.3)
MAXIMAL PREDICTED HEART RATE: 137 BPM
MCH RBC QN AUTO: 28.5 PG (ref 26.6–33)
MCHC RBC AUTO-ENTMCNC: 32.4 G/DL (ref 31.5–35.7)
MCV RBC AUTO: 88.1 FL (ref 79–97)
MONOCYTES # BLD AUTO: 0.41 10*3/MM3 (ref 0.1–0.9)
MONOCYTES NFR BLD AUTO: 5.6 % (ref 5–12)
NEUTROPHILS NFR BLD AUTO: 4.62 10*3/MM3 (ref 1.7–7)
NEUTROPHILS NFR BLD AUTO: 63.3 % (ref 42.7–76)
NRBC BLD AUTO-RTO: 0 /100 WBC (ref 0–0.2)
PLATELET # BLD AUTO: 267 10*3/MM3 (ref 140–450)
PMV BLD AUTO: 9.3 FL (ref 6–12)
POTASSIUM SERPL-SCNC: 3.3 MMOL/L (ref 3.5–5.2)
PROT SERPL-MCNC: 6.6 G/DL (ref 6–8.5)
QT INTERVAL: 448 MS
QT INTERVAL: 482 MS
QTC INTERVAL: 412 MS
QTC INTERVAL: 416 MS
RBC # BLD AUTO: 4.87 10*6/MM3 (ref 4.14–5.8)
SODIUM SERPL-SCNC: 140 MMOL/L (ref 136–145)
STRESS TARGET HR: 116 BPM
WBC # BLD AUTO: 7.3 10*3/MM3 (ref 3.4–10.8)

## 2021-10-31 PROCEDURE — 99204 OFFICE O/P NEW MOD 45 MIN: CPT | Performed by: NURSE PRACTITIONER

## 2021-10-31 PROCEDURE — 80053 COMPREHEN METABOLIC PANEL: CPT | Performed by: FAMILY MEDICINE

## 2021-10-31 PROCEDURE — 85025 COMPLETE CBC W/AUTO DIFF WBC: CPT | Performed by: FAMILY MEDICINE

## 2021-10-31 PROCEDURE — 93306 TTE W/DOPPLER COMPLETE: CPT

## 2021-10-31 PROCEDURE — 93005 ELECTROCARDIOGRAM TRACING: CPT | Performed by: FAMILY MEDICINE

## 2021-10-31 PROCEDURE — G0378 HOSPITAL OBSERVATION PER HR: HCPCS

## 2021-10-31 PROCEDURE — 93306 TTE W/DOPPLER COMPLETE: CPT | Performed by: INTERNAL MEDICINE

## 2021-10-31 PROCEDURE — 93010 ELECTROCARDIOGRAM REPORT: CPT | Performed by: EMERGENCY MEDICINE

## 2021-10-31 PROCEDURE — 25010000002 PERFLUTREN 6.52 MG/ML SUSPENSION: Performed by: FAMILY MEDICINE

## 2021-10-31 RX ADMIN — CITALOPRAM 10 MG: 10 TABLET, FILM COATED ORAL at 08:22

## 2021-10-31 RX ADMIN — PERFLUTREN 8.48 MG: 6.52 INJECTION, SUSPENSION INTRAVENOUS at 10:40

## 2021-10-31 RX ADMIN — Medication 10 ML: at 08:23

## 2021-10-31 RX ADMIN — DOCUSATE SODIUM 50 MG AND SENNOSIDES 8.6 MG 2 TABLET: 8.6; 5 TABLET, FILM COATED ORAL at 08:22

## 2021-10-31 RX ADMIN — AMLODIPINE BESYLATE 5 MG: 5 TABLET ORAL at 08:21

## 2021-10-31 NOTE — OUTREACH NOTE
Prep Survey      Responses   [facility] facility patient discharged from? [facility]   Is LACE score < 7 ? Yes   Emergency Room discharge w/ pulse ox? No   Eligibility [facility]   Date of Admission 10/30/21   Date of Discharge 10/31/21   Discharge Disposition Home or Self Care   Discharge diagnosis syncope, dementia, T2DM   Does the patient have one of the following disease processes/diagnoses(primary or secondary)? Other   Does the patient have Home health ordered? No   Is there a DME ordered? No   Prep survey completed? Yes          Yoon Gonzales RN

## 2021-11-01 ENCOUNTER — TRANSITIONAL CARE MANAGEMENT TELEPHONE ENCOUNTER (OUTPATIENT)
Dept: CALL CENTER | Facility: HOSPITAL | Age: 86
End: 2021-11-01

## 2021-11-01 ENCOUNTER — TELEPHONE (OUTPATIENT)
Dept: INTERNAL MEDICINE | Facility: CLINIC | Age: 86
End: 2021-11-01

## 2021-11-01 ENCOUNTER — HOSPITAL ENCOUNTER (OUTPATIENT)
Dept: CARDIOLOGY | Facility: HOSPITAL | Age: 86
Discharge: HOME | End: 2021-11-01
Admitting: FAMILY MEDICINE

## 2021-11-01 DIAGNOSIS — R55 SYNCOPE, UNSPECIFIED SYNCOPE TYPE: ICD-10-CM

## 2021-11-01 PROCEDURE — 93246 EXT ECG>7D<15D RECORDING: CPT

## 2021-11-01 NOTE — OUTREACH NOTE
Call Center TCM Note      Responses   [facility] patient discharged from? [place]   Does the patient have one of the following disease processes/diagnoses(primary or secondary)? Other   TCM attempt successful? Yes   Call start time 1334   Call end time 1335   Discharge diagnosis syncope, dementia, T2DM   Is patient permission given to speak with other caregiver? Yes   List who call center can speak with Tricia spouse    Person spoke with today (if not patient) and relationship Tricia spouse    Meds reviewed with patient/caregiver? Yes   Is the patient having any side effects they believe may be caused by any medication additions or changes? No   Does the patient have all medications ordered at discharge? N/A   Is the patient taking all medications as directed (includes completed medication regime)? Yes   Does the patient have a primary care provider?  Yes   Does the patient have an appointment with their PCP within 7 days of discharge? Greater than 7 days   Comments regarding PCP [facility] on 11/16/21- spouse reports that was the earliest apt available office had    What is preventing the patient from scheduling follow up appointments within 7 days of discharge? Earlier appointment not available   Nursing Interventions Verified appointment date/time/provider   Psychosocial issues? No   Did the patient receive a copy of their discharge instructions? Yes   Nursing interventions Reviewed instructions with patient   What is the patient's perception of their health status since discharge? Improving   Is the patient/caregiver able to teach back signs and symptoms related to disease process for when to call PCP? Yes   Is the patient/caregiver able to teach back signs and symptoms related to disease process for when to call 911? Yes   Is the patient/caregiver able to teach back the hierarchy of who to call/visit for symptoms/problems? PCP, Specialist, Home health nurse, Urgent Care, ED, 911 Yes   If the patient  is a current smoker, are they able to teach back resources for cessation? Not a smoker   TCM call completed? Yes          Kristine Landry RN    11/1/2021, 13:36 EDT

## 2021-11-05 ENCOUNTER — OFFICE VISIT (OUTPATIENT)
Dept: INTERNAL MEDICINE | Facility: CLINIC | Age: 86
End: 2021-11-05

## 2021-11-05 VITALS
OXYGEN SATURATION: 98 % | BODY MASS INDEX: 27.4 KG/M2 | DIASTOLIC BLOOD PRESSURE: 78 MMHG | TEMPERATURE: 97.5 F | WEIGHT: 185 LBS | HEART RATE: 63 BPM | SYSTOLIC BLOOD PRESSURE: 132 MMHG | HEIGHT: 69 IN

## 2021-11-05 DIAGNOSIS — R55 SYNCOPE, UNSPECIFIED SYNCOPE TYPE: ICD-10-CM

## 2021-11-05 DIAGNOSIS — F02.80 DEMENTIA ASSOCIATED WITH OTHER UNDERLYING DISEASE WITHOUT BEHAVIORAL DISTURBANCE (HCC): ICD-10-CM

## 2021-11-05 DIAGNOSIS — Z09 HOSPITAL DISCHARGE FOLLOW-UP: Primary | ICD-10-CM

## 2021-11-05 DIAGNOSIS — Z23 NEED FOR INFLUENZA VACCINATION: ICD-10-CM

## 2021-11-05 PROCEDURE — 1111F DSCHRG MED/CURRENT MED MERGE: CPT | Performed by: NURSE PRACTITIONER

## 2021-11-05 PROCEDURE — G0008 ADMIN INFLUENZA VIRUS VAC: HCPCS | Performed by: NURSE PRACTITIONER

## 2021-11-05 PROCEDURE — 90662 IIV NO PRSV INCREASED AG IM: CPT | Performed by: NURSE PRACTITIONER

## 2021-11-05 PROCEDURE — 99496 TRANSJ CARE MGMT HIGH F2F 7D: CPT | Performed by: NURSE PRACTITIONER

## 2021-11-05 NOTE — PROGRESS NOTES
Transitional Care Follow Up Visit  Subjective     Claudio Frausto is a 85 y.o. male who presents for a transitional care management visit.    Within 48 business hours after discharge our office contacted him via telephone to coordinate his care and needs.      I reviewed and discussed the details of that call along with the discharge summary, hospital problems, inpatient lab results, inpatient diagnostic studies, and consultation reports with Claudio.     Current outpatient and discharge medications have been reconciled for the patient.  Reviewed by: JENNIFFER Ruiz      Date of TCM Phone Call 10/31/2021   [facility]   Date of Admission 10/30/2021   Date of Discharge 10/31/2021   Discharge Disposition Home or Self Care     Risk for Readmission (LACE) Score: 5 (10/31/2021  5:00 AM)      Mr. Frausto is a pleasant 84 yo male who present to the office for hospital follow up after syncopal episode.  On October 10 patient was in the bathroom shaving and daughter reported that patient looked at him and lost consciousness and fell backwards.  He had hit his head and it was less than a minute when he returned back to consciousness.  Patient has dementia and was unable to determine symptoms before or after syncopal episode.  Related to his fall ER did complete a series of images on his head and his spine.  No acute fractures found on spine imaging and only chronic cerebral atrophy and enlargement of ventricles.  Neurology was consulted and did not seem to think that his syncopal episode was neurologic in origin.  Dr. Vuong was consulted related to the patient's history of bradycardia, heart rate has been in the 50s on presentation to the ER.  He did not have any cardiac events while in the hospital for monitoring.  Was discharged with a Holter monitor and schedule to have a follow-up with Dr. Vuong on 11/22.    Daughter is requesting home physical therapy and home nursing assistance at this time.  She  is concerned about his recent syncopal episode and undetermined origins and would like regular monitoring.  She did speak to her insurance company and they would approve 40 hours a week.  He is also having some lower extremity weakness and risk related to his recent falls would benefit from physical therapy.  Patient's family member would also like at home assessment to rule out any safety hazards in the home and determine if he would benefit from cane or walker.          Course During Hospital Stay:    The patient is unable to render any history secondary to his dementia.  Emergency department contacted neurology who stated that there did not seem to be a neurologic origin for his syncope.  Dr. Villalobos then reached out to Dr. Kumar with cardiology and a history of bradycardia in the 50s was discussed.  The ER has contacted us for admission for an observation stay for cardiac monitoring.  With regard to the patient's dementia, he has progressed to the point that he is no longer able to recognize family members nor is able to be independent in his normal ADLs such as bathing, toileting and self-care.     The following portions of the patient's history were reviewed and updated as appropriate: allergies, current medications, past family history, past medical history, past social history, past surgical history and problem list.    Review of Systems   Constitutional: Negative for activity change, appetite change, fatigue, fever and unexpected weight change.   HENT: Negative for congestion, ear discharge, ear pain, hearing loss, postnasal drip, rhinorrhea, sinus pressure, tinnitus, trouble swallowing and voice change.    Eyes: Negative for discharge and visual disturbance.   Respiratory: Negative for apnea, cough and shortness of breath.    Cardiovascular: Negative for chest pain, palpitations and leg swelling.   Gastrointestinal: Positive for constipation. Negative for abdominal pain, blood in stool and rectal pain.    Endocrine: Negative for cold intolerance, heat intolerance, polydipsia and polyphagia.   Genitourinary: Negative for decreased urine volume, difficulty urinating, frequency, hematuria and urgency.   Musculoskeletal: Negative for arthralgias, back pain, myalgias, neck pain and neck stiffness.   Skin: Negative for color change, rash and wound.   Allergic/Immunologic: Negative for environmental allergies.   Neurological: Negative for dizziness, speech difficulty, weakness, numbness and headaches.   Hematological: Negative for adenopathy. Does not bruise/bleed easily.   Psychiatric/Behavioral: Negative for agitation, confusion, decreased concentration and sleep disturbance. The patient is not nervous/anxious.        Objective   Physical Exam  Vitals and nursing note reviewed.   Constitutional:       Appearance: Normal appearance. He is well-developed, well-groomed and normal weight.   HENT:      Head: Normocephalic and atraumatic.      Right Ear: Hearing, tympanic membrane, ear canal and external ear normal.      Left Ear: Hearing, tympanic membrane, ear canal and external ear normal.      Nose: Nose normal.      Mouth/Throat:      Lips: Pink.      Mouth: Mucous membranes are moist.      Pharynx: Oropharynx is clear. Uvula midline.   Eyes:      General: Lids are normal. Lids are everted, no foreign bodies appreciated. Vision grossly intact.      Extraocular Movements: Extraocular movements intact.      Conjunctiva/sclera: Conjunctivae normal.      Pupils: Pupils are equal, round, and reactive to light.   Neck:      Thyroid: No thyromegaly.      Vascular: No carotid bruit.      Trachea: Trachea and phonation normal.   Cardiovascular:      Rate and Rhythm: Normal rate and regular rhythm.      Pulses: Normal pulses.      Heart sounds: Normal heart sounds.   Pulmonary:      Effort: Pulmonary effort is normal.      Breath sounds: Normal breath sounds.   Abdominal:      General: Abdomen is protuberant. Bowel sounds are  normal.      Palpations: Abdomen is soft. There is no hepatomegaly or splenomegaly.      Tenderness: There is abdominal tenderness in the left lower quadrant.      Comments: Has not had a bowel movement in 4 days.   Musculoskeletal:      Cervical back: Full passive range of motion without pain and neck supple.      Right lower leg: No edema.      Left lower leg: No edema.   Lymphadenopathy:      Head:      Right side of head: No submental, submandibular, tonsillar, preauricular, posterior auricular or occipital adenopathy.      Left side of head: No submental, submandibular, tonsillar, preauricular, posterior auricular or occipital adenopathy.      Cervical: No cervical adenopathy.   Skin:     General: Skin is warm and dry.      Capillary Refill: Capillary refill takes less than 2 seconds.   Neurological:      General: No focal deficit present.      Mental Status: He is alert and oriented to person, place, and time.      Motor: Weakness present.   Psychiatric:         Attention and Perception: Attention normal.         Mood and Affect: Mood normal.         Speech: Speech normal.         Behavior: Behavior normal. Behavior is cooperative.         Thought Content: Thought content normal.         Cognition and Memory: Cognition is impaired. Memory is impaired.         Assessment/Plan   Diagnoses and all orders for this visit:    1. Hospital discharge follow-up (Primary)  Comments:  toprol-xl and namzaric held related to bradycardia and side effect of fainting with namzaric  Orders:  -     Ambulatory Referral to Home Health    2. Syncope, unspecified syncope type  Comments:  holter monitor on; discontinue Nazaric-s.e. bradycardia/fainting; stopped hctz  Orders:  -     Ambulatory Referral to Home Health    3. Dementia associated with other underlying disease without behavioral disturbance (HCC)  -     Ambulatory Referral to Home Health    4. Need for influenza vaccination  -     Fluzone High-Dose 65+yrs      Patient has  Holter monitor on at this time.  We will proceed with consultation with Dr. Carolann loving of this month.  Patient's blood pressure and heart rate are stable in office today and patient is asymptomatic.  We will continue Norvasc 5 mg and Celexa 10 mg.    Will refer patient to home health for physical therapy and nurse monitoring.  Would like for patient to have blood pressure and heart rate monitored closely.  Would like to have a home safety assessment completed.  Like for them to have education on current 2 medications including side effects.  Patient is type II diabetic and would like for his blood sugars to be monitored.  Patient's family members are concerned that he is having some weakness and increasing his risk for falls.  Would like a PT evaluation to determine if he would benefit from strength training at home and whether or not he would benefit from a walker or a cane.

## 2021-11-06 ENCOUNTER — HOME HEALTH ADMISSION (OUTPATIENT)
Dept: HOME HEALTH SERVICES | Facility: HOME HEALTHCARE | Age: 86
End: 2021-11-06

## 2021-11-08 ENCOUNTER — HOME CARE VISIT (OUTPATIENT)
Dept: HOME HEALTH SERVICES | Facility: CLINIC | Age: 86
End: 2021-11-08

## 2021-11-08 VITALS
OXYGEN SATURATION: 97 % | DIASTOLIC BLOOD PRESSURE: 60 MMHG | HEART RATE: 59 BPM | SYSTOLIC BLOOD PRESSURE: 138 MMHG | WEIGHT: 180.8 LBS | RESPIRATION RATE: 16 BRPM | TEMPERATURE: 97.9 F | BODY MASS INDEX: 26.7 KG/M2

## 2021-11-08 PROCEDURE — G0299 HHS/HOSPICE OF RN EA 15 MIN: HCPCS

## 2021-11-08 NOTE — HOME HEALTH
Pt/cg agreeable to homecare admission under the care of JENNIFFER Lehman. Admission agreement and safety plan reviewed and signed by the pt. Medication reconciliation completed and no issues found. No upcoming insurance changes. Pt fell recently due to syncope. Pt had no further questions regarding medication and/or plan of care. Pt was alert and oriented to person during the SOC. The pt's wife stated that the pt sometimes isn't oriented to himself. I recommended that the wife hire a cg to watch her  while she is working and to not leave the pt alone at any time. The pt's wife stated that she has called different cg services and is waiting on one to call her back. The pt's wife also stated that she knew a cg that might be able to help her out, as well as, she has a neighbor who is a nurse and can probably come and sit with the pt. No c/o pain. VSS. No SOA or wounds. The pt is weak and unsteady and doesn't have an assistive device. The pt requires guidance in day to day activities. I will be calling Malka Downing's office to get an order for MSW. I educated the wife via teachback method about the pt's medications, fall prevention, a healthy diet, caregiving needs, and dementia disease process. Pt's wife v/u.

## 2021-11-11 ENCOUNTER — HOME CARE VISIT (OUTPATIENT)
Dept: HOME HEALTH SERVICES | Facility: CLINIC | Age: 86
End: 2021-11-11

## 2021-11-11 PROCEDURE — G0180 MD CERTIFICATION HHA PATIENT: HCPCS | Performed by: NURSE PRACTITIONER

## 2021-11-11 PROCEDURE — G0151 HHCP-SERV OF PT,EA 15 MIN: HCPCS

## 2021-11-12 VITALS
OXYGEN SATURATION: 97 % | SYSTOLIC BLOOD PRESSURE: 132 MMHG | HEART RATE: 77 BPM | RESPIRATION RATE: 18 BRPM | DIASTOLIC BLOOD PRESSURE: 82 MMHG | TEMPERATURE: 97.5 F

## 2021-11-12 NOTE — HOME HEALTH
66 yo male dx fall 10-31-21 . Pt reports fall in bathroom resulting on decreased mobility       Pmhx ; dementia - neck sx   PLOF : supervision with all activity -  up and active  no AD   Home environment : lives with spouse - 2 step entry   DME needs : na   Homebound status assessment : Pt presents with altered gait , balance , strength and endurance per dx

## 2021-11-15 ENCOUNTER — HOME CARE VISIT (OUTPATIENT)
Dept: HOME HEALTH SERVICES | Facility: CLINIC | Age: 86
End: 2021-11-15

## 2021-11-15 VITALS
TEMPERATURE: 98.9 F | DIASTOLIC BLOOD PRESSURE: 74 MMHG | RESPIRATION RATE: 16 BRPM | SYSTOLIC BLOOD PRESSURE: 118 MMHG | HEART RATE: 70 BPM | OXYGEN SATURATION: 98 %

## 2021-11-15 PROCEDURE — G0495 RN CARE TRAIN/EDU IN HH: HCPCS

## 2021-11-15 NOTE — HOME HEALTH
No recent falls, no insurance changes, and no recent medication changes. There was no need to contact the MD. Pt/cg had no further questions or concerns regarding the pt's medications or plan of care. Alert and oriented to place and self. However, the pt didn't know his birthday. No c/o pain. VSS. The pt now has a paid caregiver that will stay with him while his wife is working. I educated the wife on pain management, his medications, dementia, and fall prevention via teachback.

## 2021-11-16 ENCOUNTER — HOME CARE VISIT (OUTPATIENT)
Dept: HOME HEALTH SERVICES | Facility: CLINIC | Age: 86
End: 2021-11-16

## 2021-11-16 PROCEDURE — G0155 HHCP-SVS OF CSW,EA 15 MIN: HCPCS

## 2021-11-17 ENCOUNTER — TELEPHONE (OUTPATIENT)
Dept: INTERNAL MEDICINE | Facility: CLINIC | Age: 86
End: 2021-11-17

## 2021-11-17 ENCOUNTER — HOME CARE VISIT (OUTPATIENT)
Dept: HOME HEALTH SERVICES | Facility: CLINIC | Age: 86
End: 2021-11-17

## 2021-11-17 VITALS
SYSTOLIC BLOOD PRESSURE: 150 MMHG | OXYGEN SATURATION: 97 % | TEMPERATURE: 98 F | DIASTOLIC BLOOD PRESSURE: 80 MMHG | HEART RATE: 56 BPM | RESPIRATION RATE: 18 BRPM

## 2021-11-17 DIAGNOSIS — F02.80 DEMENTIA ASSOCIATED WITH OTHER UNDERLYING DISEASE WITHOUT BEHAVIORAL DISTURBANCE (HCC): Primary | ICD-10-CM

## 2021-11-17 PROCEDURE — G0157 HHC PT ASSISTANT EA 15: HCPCS

## 2021-11-17 RX ORDER — AMLODIPINE BESYLATE 5 MG/1
TABLET ORAL
Qty: 90 TABLET | Refills: 0 | Status: SHIPPED | OUTPATIENT
Start: 2021-11-17 | End: 2022-05-23 | Stop reason: SDUPTHER

## 2021-11-17 NOTE — HOME HEALTH
SKILLED SERVICES PROVIDED / MEDICAL   ASSESSMENT OF SOCIAL AND EMOTIONAL FACTORS  COUNSELING FOR LONG RANGE PLANNING AND DECISION MAKING  COMMUNITY RESOURCE PLANNING    HOMEBOUND STATUS-cognitive deficits, falls risk, requires assist to leave home    HOME/SOCIAL ENVIRONMENT- Patient lives in a comfortable home with spouse. Patient's spouse works full time    CASE COMMUNICATION- Do Mcgrath, PTA

## 2021-11-17 NOTE — TELEPHONE ENCOUNTER
Wife called stating per  home health nurse said due to pt's dementia & falling he needs 24/7 help. She suggested putting in an order to insurance for [facility] [place].

## 2021-11-18 ENCOUNTER — HOME CARE VISIT (OUTPATIENT)
Dept: HOME HEALTH SERVICES | Facility: CLINIC | Age: 86
End: 2021-11-18

## 2021-11-18 VITALS
SYSTOLIC BLOOD PRESSURE: 126 MMHG | RESPIRATION RATE: 18 BRPM | TEMPERATURE: 98.5 F | DIASTOLIC BLOOD PRESSURE: 76 MMHG | OXYGEN SATURATION: 97 % | HEART RATE: 56 BPM

## 2021-11-18 PROCEDURE — G0157 HHC PT ASSISTANT EA 15: HCPCS

## 2021-11-19 NOTE — TELEPHONE ENCOUNTER
I s/w wife and she said the letter would be great. She also stated per PT that he will need a rollator walker & beside commode.

## 2021-11-19 NOTE — TELEPHONE ENCOUNTER
Will get letter typed up for signature and orders for rollator and bedside commode for Dr. Crook to sign next week.

## 2021-11-21 LAB
MAXIMAL PREDICTED HEART RATE: 137 BPM
STRESS TARGET HR: 116 BPM

## 2021-11-21 PROCEDURE — 93244 EXT ECG>48HR<7D REV&INTERPJ: CPT | Performed by: INTERNAL MEDICINE

## 2021-11-22 ENCOUNTER — HOME CARE VISIT (OUTPATIENT)
Dept: HOME HEALTH SERVICES | Facility: CLINIC | Age: 86
End: 2021-11-22

## 2021-11-23 ENCOUNTER — HOME CARE VISIT (OUTPATIENT)
Dept: HOME HEALTH SERVICES | Facility: CLINIC | Age: 86
End: 2021-11-23

## 2021-11-23 ENCOUNTER — HOME CARE VISIT (OUTPATIENT)
Dept: HOME HEALTH SERVICES | Facility: HOME HEALTHCARE | Age: 86
End: 2021-11-23

## 2021-11-23 VITALS
DIASTOLIC BLOOD PRESSURE: 80 MMHG | SYSTOLIC BLOOD PRESSURE: 118 MMHG | RESPIRATION RATE: 16 BRPM | OXYGEN SATURATION: 99 % | HEART RATE: 54 BPM | TEMPERATURE: 98.6 F

## 2021-11-23 PROCEDURE — G0495 RN CARE TRAIN/EDU IN HH: HCPCS

## 2021-11-23 NOTE — HOME HEALTH
No recent falls, no insurance changes, and no recent medication changes. There was no need to contact the MD. Pt/cg had no further questions or concerns regarding the pt's medications or plan of care. Alert and oriented to person. No c/o pain. VSS. SN goals met. Pt's wife is aware that SN will be discharging the pt and is agreeable, but PT will continue seeing the pt.

## 2021-11-24 ENCOUNTER — HOME CARE VISIT (OUTPATIENT)
Dept: HOME HEALTH SERVICES | Facility: CLINIC | Age: 86
End: 2021-11-24

## 2021-11-24 VITALS
HEART RATE: 52 BPM | OXYGEN SATURATION: 98 % | RESPIRATION RATE: 18 BRPM | DIASTOLIC BLOOD PRESSURE: 80 MMHG | TEMPERATURE: 97.3 F | SYSTOLIC BLOOD PRESSURE: 150 MMHG

## 2021-11-24 PROCEDURE — G0157 HHC PT ASSISTANT EA 15: HCPCS

## 2021-11-29 ENCOUNTER — HOME CARE VISIT (OUTPATIENT)
Dept: HOME HEALTH SERVICES | Facility: CLINIC | Age: 86
End: 2021-11-29

## 2021-11-29 VITALS
RESPIRATION RATE: 18 BRPM | TEMPERATURE: 98.4 F | HEART RATE: 98 BPM | OXYGEN SATURATION: 98 % | SYSTOLIC BLOOD PRESSURE: 148 MMHG | DIASTOLIC BLOOD PRESSURE: 88 MMHG

## 2021-11-29 PROCEDURE — G0157 HHC PT ASSISTANT EA 15: HCPCS

## 2021-11-30 ENCOUNTER — HOME CARE VISIT (OUTPATIENT)
Dept: HOME HEALTH SERVICES | Facility: CLINIC | Age: 86
End: 2021-11-30

## 2021-11-30 VITALS
SYSTOLIC BLOOD PRESSURE: 142 MMHG | OXYGEN SATURATION: 99 % | TEMPERATURE: 97.3 F | RESPIRATION RATE: 16 BRPM | DIASTOLIC BLOOD PRESSURE: 86 MMHG | HEART RATE: 79 BPM

## 2021-11-30 PROCEDURE — G0151 HHCP-SERV OF PT,EA 15 MIN: HCPCS

## 2021-12-07 ENCOUNTER — TELEPHONE (OUTPATIENT)
Dept: INTERNAL MEDICINE | Facility: CLINIC | Age: 86
End: 2021-12-07

## 2021-12-07 NOTE — TELEPHONE ENCOUNTER
Georgia at [facility] stated she received an order for Rolator but needs a diff. Diagnosis. T# [phone]

## 2021-12-08 NOTE — TELEPHONE ENCOUNTER
Georgia called back stating need a diagnosis of arthritis of knees or back.  Told her that although he likely has those things, at his age, we do not have it documented.  Only other thing we have is lower extremity weakness and instability of gait.  She will try using instability of gait and see if that will be covered.

## 2022-01-04 ENCOUNTER — TELEPHONE (OUTPATIENT)
Dept: INTERNAL MEDICINE | Facility: CLINIC | Age: 87
End: 2022-01-04

## 2022-01-04 NOTE — TELEPHONE ENCOUNTER
Patient's wife called about a letter that was made on 11/22/2021 stating that the patient needed 24 hour care due to dementia. His insurance is now requiring the letter to state specifically that he will need 24 hour care from 1/1/2022 to indefinitely. She asked that the letter please be faxed to [employer] for Maryann.     Fax #: [phone]

## 2022-01-25 ENCOUNTER — TELEPHONE (OUTPATIENT)
Dept: INTERNAL MEDICINE | Facility: CLINIC | Age: 87
End: 2022-01-25

## 2022-01-25 NOTE — TELEPHONE ENCOUNTER
Nikos with [employer] called, stating that the request for personal care would have to go thru his medical insurance and [employer] would only be involved is he needed skilled nursing.  Wife informed and voiced understanding, stating she would call his insurance tomorrow.

## 2022-01-25 NOTE — TELEPHONE ENCOUNTER
Joan from [facility] [phone] called to verify home health services for patient, based on the letter we faxed.  She will submit the request and see if they can cover H/H services.

## 2022-03-15 ENCOUNTER — TELEPHONE (OUTPATIENT)
Dept: INTERNAL MEDICINE | Facility: CLINIC | Age: 87
End: 2022-03-15

## 2022-03-15 DIAGNOSIS — R55 SYNCOPE, UNSPECIFIED SYNCOPE TYPE: ICD-10-CM

## 2022-03-15 DIAGNOSIS — Z91.81 AT HIGH RISK FOR INJURY RELATED TO FALL: ICD-10-CM

## 2022-03-15 DIAGNOSIS — F02.80 DEMENTIA ASSOCIATED WITH OTHER UNDERLYING DISEASE WITHOUT BEHAVIORAL DISTURBANCE: Primary | ICD-10-CM

## 2022-03-15 NOTE — TELEPHONE ENCOUNTER
Pt wife is having trouble with Mr Frausto sliding into the floor and she cannot lift him.    She had to call for someone to come to their house this morning to help.  He is not getting hurt but she is unable to do this.  She is wanting to talk to someone here about getting a lift to help her to lift him.    Best call back number [phone]

## 2022-03-15 NOTE — TELEPHONE ENCOUNTER
Order for patient lift attached, if you approve.    Called wife and informed that we can write an order for a patient lift, but unsure of Medicare guideline requirements and whether or not he needs to be seen to document extensively, or if they will just take this phone note to cover the equipment.  Told her we would fax the order to [facility], at her request, but she needs to talk with them and find out the cost, in case they supply the equipment and insurance denies the claim, etc.  She says she does not intend to take receipt of the equipment until she knows insurance will cover it.  Repeatedly explained to her to make sure with [employer], as she keeps repeating that people she works with say we just have to write an order and it will be covered and told her that is not the case, it has to meet Medicare guidelines for the equipment being requested.

## 2022-03-23 ENCOUNTER — TELEPHONE (OUTPATIENT)
Dept: INTERNAL MEDICINE | Facility: CLINIC | Age: 87
End: 2022-03-23

## 2022-03-23 DIAGNOSIS — F02.80 DEMENTIA ASSOCIATED WITH OTHER UNDERLYING DISEASE WITHOUT BEHAVIORAL DISTURBANCE: Primary | ICD-10-CM

## 2022-03-23 DIAGNOSIS — R55 SYNCOPE, UNSPECIFIED SYNCOPE TYPE: ICD-10-CM

## 2022-03-23 DIAGNOSIS — Z91.81 AT HIGH RISK FOR INJURY RELATED TO FALL: ICD-10-CM

## 2022-03-23 NOTE — TELEPHONE ENCOUNTER
Wife called asking for Hospital bed. When he tries to get out of bed he slides down and ends up on floor and she cant get him up.

## 2022-03-23 NOTE — TELEPHONE ENCOUNTER
I had previously explained to wife, when she called recently asking for a lift, that it wasn't a problem to order equipment, but insurance normally requires a recent visit and documentation of the need for said equipment before they would cover.  She had told me that she would not accept deliver of equipment until she made sure from DME supplier that equipment was covered.  I have attached order for hospital bed, if you approve.

## 2022-03-28 ENCOUNTER — OFFICE VISIT (OUTPATIENT)
Dept: INTERNAL MEDICINE | Facility: CLINIC | Age: 87
End: 2022-03-28

## 2022-03-28 VITALS
OXYGEN SATURATION: 100 % | WEIGHT: 170 LBS | HEART RATE: 60 BPM | SYSTOLIC BLOOD PRESSURE: 148 MMHG | BODY MASS INDEX: 25.18 KG/M2 | DIASTOLIC BLOOD PRESSURE: 80 MMHG | HEIGHT: 69 IN | TEMPERATURE: 97.1 F

## 2022-03-28 DIAGNOSIS — I99.9 CIRCULATION PROBLEM: ICD-10-CM

## 2022-03-28 DIAGNOSIS — F02.80 LATE ONSET ALZHEIMER'S DEMENTIA WITHOUT BEHAVIORAL DISTURBANCE: ICD-10-CM

## 2022-03-28 DIAGNOSIS — G30.1 LATE ONSET ALZHEIMER'S DEMENTIA WITHOUT BEHAVIORAL DISTURBANCE: ICD-10-CM

## 2022-03-28 DIAGNOSIS — Z91.81 AT HIGH RISK FOR INJURY RELATED TO FALL: ICD-10-CM

## 2022-03-28 DIAGNOSIS — F02.80 DEMENTIA ASSOCIATED WITH OTHER UNDERLYING DISEASE WITHOUT BEHAVIORAL DISTURBANCE: Primary | ICD-10-CM

## 2022-03-28 DIAGNOSIS — R09.89 PROLONGED CAPILLARY REFILL TIME: ICD-10-CM

## 2022-03-28 PROCEDURE — 99214 OFFICE O/P EST MOD 30 MIN: CPT | Performed by: NURSE PRACTITIONER

## 2022-03-28 NOTE — PROGRESS NOTES
Subjective   Claudio Frausto is a 86 y.o. male.   Chief Complaint   Patient presents with   • Fall     Needs established visit supporting why pt needs lift and hospital bed, he had orders faxed last week but needed a visit in order for insurance to pay, fax to [facility]    • Foot Problem     Both feet are purple, wife states she is unsure of circulation       Claudio presents today with his wife in effort to obtain some more equipment for the home to help care for him.  Claudio has dementia from late onset Alzheimer's and has 2 sitters in the home with him and his wife to help care for him.  He is unable to care for himself, dress himself.  He wears depends and if they are able to get him to the bathroom before he goes he will use the toilet but otherwise needs assistance with this.  He does feed himself.  He is able to ambulate with assistance and wife reports he does well with a Rolator at home.  They are needing a hospital bed to safely get Mr. Frausto in and out of bed.  With the current bed he sits on the edge of the bed but unfortunately slides down the side at times, landing on the floor.  They are also requesting a lift to help in the event he does fall to the floor.  He fell in the bathroom about 3 weeks ago.  His wife was with him but she is unable to lift him herself and had to call EMS.       Wife does want his feet looked at today.  She states she has noticed his feet turning purple.  She reports this has been going on a long time and during a hospitalization in October it was mentioned but it wasn't further evaluated.  He does not complain of pain but she states he also doesn't walk well and wonders if this is related to this.     The following portions of the patient's history were reviewed and updated as appropriate: allergies, current medications, past family history, past medical history, past social history, past surgical history and problem list.    Review of Systems   Unable to perform ROS: Dementia        Objective   Past Medical History:   Diagnosis Date   • BCC (basal cell carcinoma)     right ear   • Cancer (HCC)     skin   • Hypertension    • Short-term memory loss    • Trigger finger    • Trigger finger, left middle finger 12/21/2016   • Wears dentures    • Wears glasses       Past Surgical History:   Procedure Laterality Date   • LAPAROSCOPIC CHOLECYSTECTOMY     • TRIGGER FINGER RELEASE Left     and on right hand   • TRIGGER FINGER RELEASE Left 5/12/2017    Procedure: LEFT RING FINGER TRIGGER RELEASE;  Surgeon: Tyler Godwin MD;  Location: [facility];  Service:         Current Outpatient Medications:   •  amLODIPine (NORVASC) 5 MG tablet, TAKE 1 TABLET DAILY, Disp: 90 tablet, Rfl: 0      Vitals:    03/28/22 1315   BP: 148/80   Pulse: 60   Temp: 97.1 °F (36.2 °C)   SpO2: 100%         03/28/22  1315   Weight: 77.1 kg (170 lb)       Body mass index is 25.1 kg/m².    Physical Exam  Constitutional:       General: He is not in acute distress.     Appearance: He is well-developed.   HENT:      Head: Normocephalic.      Right Ear: External ear normal.      Left Ear: External ear normal.      Mouth/Throat:      Mouth: No oral lesions.   Eyes:      Conjunctiva/sclera: Conjunctivae normal.   Cardiovascular:      Rate and Rhythm: Normal rate and regular rhythm.      Pulses:           Dorsalis pedis pulses are detected w/ Doppler on the right side and 1+ on the left side.        Posterior tibial pulses are detected w/ Doppler on the right side and detected w/ Doppler on the left side.      Heart sounds: Normal heart sounds.   Pulmonary:      Effort: Pulmonary effort is normal.      Breath sounds: Normal breath sounds.   Musculoskeletal:      Right lower leg: No edema.      Left lower leg: No edema.   Feet:      Comments: Bilateral feet purple with Cap refill at 4-5 seconds.  No obvious sign of discomfort on exam; no breaks in the skin.  The right 3rd digit has reddened area to the knuckle which looks to be from rubbing  on the shoe.   Bilateral feel are warm to the touch.   Skin:     General: Skin is warm and dry.   Neurological:      Mental Status: He is alert and oriented to person, place, and time.      Gait: Gait normal.   Psychiatric:         Mood and Affect: Mood normal.         Speech: Speech normal.         Behavior: Behavior normal.         Thought Content: Thought content normal.               Assessment/Plan   Diagnoses and all orders for this visit:    1. Dementia associated with other underlying disease without behavioral disturbance (HCC) (Primary)    2. Late onset Alzheimer's dementia without behavioral disturbance (HCC)  -     Hospital Bed  -     Miscellaneous DME    3. At high risk for injury related to fall  -     Hospital Bed  -     Miscellaneous DME    4. Prolonged capillary refill time  -     US Ankle / Brachial Indices Extremity Complete; Future    5. Circulation problem  -     US Ankle / Brachial Indices Extremity Complete; Future      Orders reentered for hospital bed and patient lift.  For safety concerns, patient would greatly benefit from both of these devices.  He is at high risk for falls, walks with shuffling gait, and is limited cognitively.  He is able to follow some instructions but typically his response is delayed.    IN regards to his feet, I am able to find pulses in feet today.  I've discussed being cautious with the rubbed area on his toe and to put barrier on this while wearing shoes.  Will get arterial studies completed to evaluate further.  I attempted to order venous doppler but unable to link to covered diagnosis.  He is to follow up with Dr. Lu on 4/28/22 which they need to continue with.

## 2022-04-14 ENCOUNTER — APPOINTMENT (OUTPATIENT)
Dept: ULTRASOUND IMAGING | Facility: HOSPITAL | Age: 87
End: 2022-04-14

## 2022-04-22 ENCOUNTER — HOSPITAL ENCOUNTER (OUTPATIENT)
Dept: ULTRASOUND IMAGING | Facility: HOSPITAL | Age: 87
Discharge: HOME | End: 2022-04-22
Admitting: NURSE PRACTITIONER

## 2022-04-22 DIAGNOSIS — I99.9 CIRCULATION PROBLEM: ICD-10-CM

## 2022-04-22 DIAGNOSIS — R09.89 PROLONGED CAPILLARY REFILL TIME: ICD-10-CM

## 2022-04-22 PROCEDURE — 93923 UPR/LXTR ART STDY 3+ LVLS: CPT | Performed by: SURGERY

## 2022-04-22 PROCEDURE — 93923 UPR/LXTR ART STDY 3+ LVLS: CPT

## 2022-04-28 ENCOUNTER — OFFICE VISIT (OUTPATIENT)
Dept: INTERNAL MEDICINE | Facility: CLINIC | Age: 87
End: 2022-04-28

## 2022-04-28 VITALS
DIASTOLIC BLOOD PRESSURE: 68 MMHG | SYSTOLIC BLOOD PRESSURE: 132 MMHG | TEMPERATURE: 96.9 F | WEIGHT: 182 LBS | OXYGEN SATURATION: 98 % | HEART RATE: 57 BPM | HEIGHT: 69 IN | BODY MASS INDEX: 26.96 KG/M2

## 2022-04-28 DIAGNOSIS — E78.5 HYPERLIPIDEMIA, UNSPECIFIED HYPERLIPIDEMIA TYPE: ICD-10-CM

## 2022-04-28 DIAGNOSIS — F02.80 DEMENTIA ASSOCIATED WITH OTHER UNDERLYING DISEASE WITHOUT BEHAVIORAL DISTURBANCE: ICD-10-CM

## 2022-04-28 DIAGNOSIS — E03.9 HYPOTHYROIDISM, UNSPECIFIED TYPE: ICD-10-CM

## 2022-04-28 DIAGNOSIS — Z74.09 IMPAIRED MOBILITY: Primary | ICD-10-CM

## 2022-04-28 DIAGNOSIS — I10 PRIMARY HYPERTENSION: ICD-10-CM

## 2022-04-28 DIAGNOSIS — R73.01 IMPAIRED FASTING BLOOD SUGAR: ICD-10-CM

## 2022-04-28 LAB — HBA1C MFR BLD: 5.6 %

## 2022-04-28 PROCEDURE — 83036 HEMOGLOBIN GLYCOSYLATED A1C: CPT | Performed by: FAMILY MEDICINE

## 2022-04-28 PROCEDURE — 99214 OFFICE O/P EST MOD 30 MIN: CPT | Performed by: FAMILY MEDICINE

## 2022-04-28 PROCEDURE — 3044F HG A1C LEVEL LT 7.0%: CPT | Performed by: FAMILY MEDICINE

## 2022-05-04 NOTE — PROGRESS NOTES
"Chief Complaint  Incontinence    Subjective          Claudio Frausto presents to [facility] UROLOGY for incontinence.  He is here today with his wife.  Claudio has progressive dementia.  Apparently he has these sudden episodes of incontinence.. Dennise tries to get him to void prior to going to bed but he has not been able to do this.    Current Outpatient Medications:   •  amLODIPine (NORVASC) 5 MG tablet, TAKE 1 TABLET DAILY, Disp: 90 tablet, Rfl: 0  •  Mirabegron ER (Myrbetriq) 25 MG tablet sustained-release 24 hour 24 hr tablet, Take 1 tablet by mouth Daily., Disp: 30 tablet, Rfl: 11  Past Medical History:   Diagnosis Date   • BCC (basal cell carcinoma)     right ear   • Cancer (HCC)     skin   • Hypertension    • Short-term memory loss    • Trigger finger    • Trigger finger, left middle finger 12/21/2016   • Wears dentures    • Wears glasses      Past Surgical History:   Procedure Laterality Date   • LAPAROSCOPIC CHOLECYSTECTOMY     • TRIGGER FINGER RELEASE Left     and on right hand   • TRIGGER FINGER RELEASE Left 5/12/2017    Procedure: LEFT RING FINGER TRIGGER RELEASE;  Surgeon: Tyler Godwin MD;  Location: [facility] OR;  Service:            Review  of systems-obtained from wife.  Constitutional: Negative for chills or fever.   Gastrointestinal: Negative for abdominal pain, anal bleeding or blood in stool.   : Incontinence        Objective   PHYSICAL EXAM  Vital Signs:   Temp 98 °F (36.7 °C)   Ht 175.3 cm (69\")   Wt 82.6 kg (182 lb)   BMI 26.88 kg/m²     Constitutional: Patient is without distress or deformity.  Vital signs are reviewed as above.    Neuro: No confusion; No disorientation; Alert and oriented  Pulmonary: No respiratory distress.   Skin: No pallor or diaphoresis      DATA  Result Review :              Results for orders placed or performed in visit on 04/28/22   POC Glycated Hemoglobin, Total    Specimen: Urine   Result Value Ref Range    Hemoglobin A1C 5.6 %       Bladder " Scan interpretation  Estimation of residual urine via abdominal ultrasound  Residual Urine: 106ml  Indication: Retention  Position: Supine  Examination: Incremental scanning of the suprapubic area using 3 MHz transducer using copious amounts of acoustic gel.   Findings: An anechoic area was demonstrated which represented the bladder, with measurement of residual urine as noted. I inspected this myself. In that the residual urine was stable or insignificant, no treatment will be necessary at this time.                    ASSESSMENT AND PLAN          Problem List Items Addressed This Visit    None     Visit Diagnoses     Urge incontinence    -  Primary    Relevant Medications    Mirabegron ER (Myrbetriq) 25 MG tablet sustained-release 24 hour 24 hr tablet    Other Relevant Orders    POC Urinalysis Dipstick, Multipro      He cannot take an anticholinergic with his dementia.  I think this would make that significantly worse.  I talked her about mirabegron today I will start him on a 25 mg dose.  I also gave him a prescription for condom catheter use.        FOLLOW UP     Return if symptoms worsen or fail to improve.        (Please note that portions of this note were completed with a voice recognition program.)  Tima Camacho MD  05/05/22  08:46 CDT

## 2022-05-05 ENCOUNTER — OFFICE VISIT (OUTPATIENT)
Dept: UROLOGY | Facility: CLINIC | Age: 87
End: 2022-05-05

## 2022-05-05 VITALS — WEIGHT: 182 LBS | TEMPERATURE: 98 F | HEIGHT: 69 IN | BODY MASS INDEX: 26.96 KG/M2

## 2022-05-05 DIAGNOSIS — N39.41 URGE INCONTINENCE: Primary | ICD-10-CM

## 2022-05-05 PROCEDURE — 99213 OFFICE O/P EST LOW 20 MIN: CPT | Performed by: UROLOGY

## 2022-05-23 RX ORDER — AMLODIPINE BESYLATE 5 MG/1
5 TABLET ORAL DAILY
Qty: 90 TABLET | Refills: 3 | Status: SHIPPED | OUTPATIENT
Start: 2022-05-23 | End: 2022-12-29 | Stop reason: SDUPTHER

## 2022-05-25 ENCOUNTER — TELEPHONE (OUTPATIENT)
Dept: INTERNAL MEDICINE | Facility: CLINIC | Age: 87
End: 2022-05-25

## 2022-05-25 NOTE — TELEPHONE ENCOUNTER
Patients wife called stating he had not gotten his BP pills from [facility] due to needing to talk to the provider  The number they gave her was [phone]

## 2022-05-25 NOTE — TELEPHONE ENCOUNTER
"Called and was told that there is a \"consent hold\" on his Amlodipine and wife needs to call Customer Care and give consent to fill and send the Rx.  Called and LVM informing her of this and that she needs to call [phone] and given them consent or permission to send the Rx.  "

## 2022-07-14 ENCOUNTER — TELEPHONE (OUTPATIENT)
Dept: INTERNAL MEDICINE | Facility: CLINIC | Age: 87
End: 2022-07-14

## 2022-07-14 DIAGNOSIS — Z91.81 AT HIGH RISK FOR INJURY RELATED TO FALL: ICD-10-CM

## 2022-07-14 DIAGNOSIS — F02.80 LATE ONSET ALZHEIMER'S DEMENTIA WITHOUT BEHAVIORAL DISTURBANCE: ICD-10-CM

## 2022-07-14 DIAGNOSIS — Z74.09 IMPAIRED MOBILITY: Primary | ICD-10-CM

## 2022-07-14 DIAGNOSIS — G30.1 LATE ONSET ALZHEIMER'S DEMENTIA WITHOUT BEHAVIORAL DISTURBANCE: ICD-10-CM

## 2022-07-14 NOTE — TELEPHONE ENCOUNTER
Called wife to clarify - she wants to take him to [facility] for outpatient PT.  Last visit here was in April and decreased mobility was noted.  He has dementia.  Ok to send order?

## 2022-07-14 NOTE — TELEPHONE ENCOUNTER
PATIENTS WIFE REQUESTING AN ORDER FOR PHYSICAL THERAPY FOR PATIENT TO STRENGTHENING HIS LEGS  BE SENT TO [facility]   PLEASE FAX ALONG WITH COPY OF PATIENTS INSURANCE CARD    PLEASE ATTENTION REHAB          GOOD CALL BACK   [phone]

## 2022-07-22 ENCOUNTER — TELEPHONE (OUTPATIENT)
Dept: INTERNAL MEDICINE | Facility: CLINIC | Age: 87
End: 2022-07-22

## 2022-07-22 NOTE — TELEPHONE ENCOUNTER
Caller: Tricia Frausto    Relationship: Emergency Contact    Best call back number: [phone]    What is the best time to reach you: ANY    Who are you requesting to speak with (clinical staff, provider,  specific staff member): CLINICAL STAFF    What was the call regarding: PATIENTS WIFE CALLED STATING SHE STILL HAS NOT HEARD FROM THE OFFICE REGARDING HER HUSBANDS PHYSICAL THERAPY.    HUB UNABLE TO WARM TRANSFER FOR ASSISTANCE    Do you require a callback: YES

## 2022-07-22 NOTE — TELEPHONE ENCOUNTER
Advised I refaxed order and they need to contact the rehab  at [facility]

## 2022-08-15 ENCOUNTER — APPOINTMENT (OUTPATIENT)
Dept: CT IMAGING | Facility: HOSPITAL | Age: 87
End: 2022-08-15

## 2022-08-15 ENCOUNTER — HOSPITAL ENCOUNTER (EMERGENCY)
Facility: HOSPITAL | Age: 87
Discharge: HOME | End: 2022-08-15
Attending: FAMILY MEDICINE | Admitting: FAMILY MEDICINE

## 2022-08-15 ENCOUNTER — APPOINTMENT (OUTPATIENT)
Dept: GENERAL RADIOLOGY | Facility: HOSPITAL | Age: 87
End: 2022-08-15

## 2022-08-15 VITALS
HEIGHT: 70 IN | BODY MASS INDEX: 26.48 KG/M2 | WEIGHT: 185 LBS | DIASTOLIC BLOOD PRESSURE: 75 MMHG | HEART RATE: 58 BPM | RESPIRATION RATE: 20 BRPM | SYSTOLIC BLOOD PRESSURE: 138 MMHG | OXYGEN SATURATION: 99 % | TEMPERATURE: 98 F

## 2022-08-15 DIAGNOSIS — R53.1 WEAKNESS: ICD-10-CM

## 2022-08-15 DIAGNOSIS — N39.0 URINARY TRACT INFECTION WITHOUT HEMATURIA, SITE UNSPECIFIED: Primary | ICD-10-CM

## 2022-08-15 LAB
ALBUMIN SERPL-MCNC: 3.7 G/DL (ref 3.5–5.2)
ALBUMIN/GLOB SERPL: 1.1 G/DL
ALP SERPL-CCNC: 91 U/L (ref 39–117)
ALT SERPL W P-5'-P-CCNC: 13 U/L (ref 1–41)
ANION GAP SERPL CALCULATED.3IONS-SCNC: 9 MMOL/L (ref 5–15)
APTT PPP: 31.3 SECONDS (ref 24.1–35)
AST SERPL-CCNC: 16 U/L (ref 1–40)
BACTERIA UR QL AUTO: ABNORMAL /HPF
BASOPHILS # BLD AUTO: 0.03 10*3/MM3 (ref 0–0.2)
BASOPHILS NFR BLD AUTO: 0.7 % (ref 0–1.5)
BILIRUB SERPL-MCNC: 0.6 MG/DL (ref 0–1.2)
BILIRUB UR QL STRIP: NEGATIVE
BUN SERPL-MCNC: 10 MG/DL (ref 8–23)
BUN/CREAT SERPL: 9 (ref 7–25)
CALCIUM SPEC-SCNC: 9.7 MG/DL (ref 8.6–10.5)
CHLORIDE SERPL-SCNC: 104 MMOL/L (ref 98–107)
CLARITY UR: ABNORMAL
CO2 SERPL-SCNC: 27 MMOL/L (ref 22–29)
COLOR UR: ABNORMAL
CREAT SERPL-MCNC: 1.11 MG/DL (ref 0.76–1.27)
D DIMER PPP FEU-MCNC: 1.93 MCGFEU/ML (ref 0–0.5)
D-LACTATE SERPL-SCNC: 0.8 MMOL/L (ref 0.5–2)
DEPRECATED RDW RBC AUTO: 43.6 FL (ref 37–54)
EGFRCR SERPLBLD CKD-EPI 2021: 65.5 ML/MIN/1.73
EOSINOPHIL # BLD AUTO: 0.2 10*3/MM3 (ref 0–0.4)
EOSINOPHIL NFR BLD AUTO: 4.9 % (ref 0.3–6.2)
ERYTHROCYTE [DISTWIDTH] IN BLOOD BY AUTOMATED COUNT: 13.2 % (ref 12.3–15.4)
GLOBULIN UR ELPH-MCNC: 3.3 GM/DL
GLUCOSE SERPL-MCNC: 92 MG/DL (ref 65–99)
GLUCOSE UR STRIP-MCNC: NEGATIVE MG/DL
HCT VFR BLD AUTO: 36.9 % (ref 37.5–51)
HGB BLD-MCNC: 11.7 G/DL (ref 13–17.7)
HGB UR QL STRIP.AUTO: ABNORMAL
HYALINE CASTS UR QL AUTO: ABNORMAL /LPF
IMM GRANULOCYTES # BLD AUTO: 0 10*3/MM3 (ref 0–0.05)
IMM GRANULOCYTES NFR BLD AUTO: 0 % (ref 0–0.5)
INR PPP: 1.08 (ref 0.91–1.09)
KETONES UR QL STRIP: NEGATIVE
LEUKOCYTE ESTERASE UR QL STRIP.AUTO: ABNORMAL
LYMPHOCYTES # BLD AUTO: 1.52 10*3/MM3 (ref 0.7–3.1)
LYMPHOCYTES NFR BLD AUTO: 37.3 % (ref 19.6–45.3)
MCH RBC QN AUTO: 28.3 PG (ref 26.6–33)
MCHC RBC AUTO-ENTMCNC: 31.7 G/DL (ref 31.5–35.7)
MCV RBC AUTO: 89.3 FL (ref 79–97)
MONOCYTES # BLD AUTO: 0.32 10*3/MM3 (ref 0.1–0.9)
MONOCYTES NFR BLD AUTO: 7.8 % (ref 5–12)
NEUTROPHILS NFR BLD AUTO: 2.01 10*3/MM3 (ref 1.7–7)
NEUTROPHILS NFR BLD AUTO: 49.3 % (ref 42.7–76)
NITRITE UR QL STRIP: POSITIVE
NRBC BLD AUTO-RTO: 0 /100 WBC (ref 0–0.2)
NT-PROBNP SERPL-MCNC: 93.7 PG/ML (ref 0–1800)
PH UR STRIP.AUTO: 7 [PH] (ref 5–8)
PLATELET # BLD AUTO: 226 10*3/MM3 (ref 140–450)
PMV BLD AUTO: 8.6 FL (ref 6–12)
POTASSIUM SERPL-SCNC: 3.8 MMOL/L (ref 3.5–5.2)
PROT SERPL-MCNC: 7 G/DL (ref 6–8.5)
PROT UR QL STRIP: NEGATIVE
PROTHROMBIN TIME: 13.6 SECONDS (ref 11.9–14.6)
RBC # BLD AUTO: 4.13 10*6/MM3 (ref 4.14–5.8)
RBC # UR STRIP: ABNORMAL /HPF
REF LAB TEST METHOD: ABNORMAL
SODIUM SERPL-SCNC: 140 MMOL/L (ref 136–145)
SP GR UR STRIP: 1.01 (ref 1–1.03)
SQUAMOUS #/AREA URNS HPF: ABNORMAL /HPF
TROPONIN T SERPL-MCNC: <0.01 NG/ML (ref 0–0.03)
TROPONIN T SERPL-MCNC: <0.01 NG/ML (ref 0–0.03)
UROBILINOGEN UR QL STRIP: ABNORMAL
WBC # UR STRIP: ABNORMAL /HPF
WBC NRBC COR # BLD: 4.08 10*3/MM3 (ref 3.4–10.8)

## 2022-08-15 PROCEDURE — 72131 CT LUMBAR SPINE W/O DYE: CPT

## 2022-08-15 PROCEDURE — 85025 COMPLETE CBC W/AUTO DIFF WBC: CPT | Performed by: FAMILY MEDICINE

## 2022-08-15 PROCEDURE — 85730 THROMBOPLASTIN TIME PARTIAL: CPT | Performed by: FAMILY MEDICINE

## 2022-08-15 PROCEDURE — 87147 CULTURE TYPE IMMUNOLOGIC: CPT | Performed by: FAMILY MEDICINE

## 2022-08-15 PROCEDURE — 72128 CT CHEST SPINE W/O DYE: CPT

## 2022-08-15 PROCEDURE — 25010000002 LEVOFLOXACIN PER 250 MG: Performed by: FAMILY MEDICINE

## 2022-08-15 PROCEDURE — 71045 X-RAY EXAM CHEST 1 VIEW: CPT

## 2022-08-15 PROCEDURE — 87040 BLOOD CULTURE FOR BACTERIA: CPT | Performed by: FAMILY MEDICINE

## 2022-08-15 PROCEDURE — 80053 COMPREHEN METABOLIC PANEL: CPT | Performed by: FAMILY MEDICINE

## 2022-08-15 PROCEDURE — 87086 URINE CULTURE/COLONY COUNT: CPT | Performed by: FAMILY MEDICINE

## 2022-08-15 PROCEDURE — 96365 THER/PROPH/DIAG IV INF INIT: CPT

## 2022-08-15 PROCEDURE — 36415 COLL VENOUS BLD VENIPUNCTURE: CPT

## 2022-08-15 PROCEDURE — 81001 URINALYSIS AUTO W/SCOPE: CPT | Performed by: FAMILY MEDICINE

## 2022-08-15 PROCEDURE — 83605 ASSAY OF LACTIC ACID: CPT | Performed by: FAMILY MEDICINE

## 2022-08-15 PROCEDURE — 87150 DNA/RNA AMPLIFIED PROBE: CPT | Performed by: FAMILY MEDICINE

## 2022-08-15 PROCEDURE — 87077 CULTURE AEROBIC IDENTIFY: CPT | Performed by: FAMILY MEDICINE

## 2022-08-15 PROCEDURE — 0 IOPAMIDOL PER 1 ML: Performed by: FAMILY MEDICINE

## 2022-08-15 PROCEDURE — 83880 ASSAY OF NATRIURETIC PEPTIDE: CPT | Performed by: FAMILY MEDICINE

## 2022-08-15 PROCEDURE — 72125 CT NECK SPINE W/O DYE: CPT

## 2022-08-15 PROCEDURE — 85610 PROTHROMBIN TIME: CPT | Performed by: FAMILY MEDICINE

## 2022-08-15 PROCEDURE — 71275 CT ANGIOGRAPHY CHEST: CPT

## 2022-08-15 PROCEDURE — 87186 SC STD MICRODIL/AGAR DIL: CPT | Performed by: FAMILY MEDICINE

## 2022-08-15 PROCEDURE — 70450 CT HEAD/BRAIN W/O DYE: CPT

## 2022-08-15 PROCEDURE — 84484 ASSAY OF TROPONIN QUANT: CPT | Performed by: FAMILY MEDICINE

## 2022-08-15 PROCEDURE — 93005 ELECTROCARDIOGRAM TRACING: CPT | Performed by: FAMILY MEDICINE

## 2022-08-15 PROCEDURE — 93010 ELECTROCARDIOGRAM REPORT: CPT | Performed by: INTERNAL MEDICINE

## 2022-08-15 PROCEDURE — 99284 EMERGENCY DEPT VISIT MOD MDM: CPT

## 2022-08-15 PROCEDURE — 85379 FIBRIN DEGRADATION QUANT: CPT | Performed by: FAMILY MEDICINE

## 2022-08-15 RX ORDER — LEVOFLOXACIN 5 MG/ML
500 INJECTION, SOLUTION INTRAVENOUS ONCE
Status: COMPLETED | OUTPATIENT
Start: 2022-08-15 | End: 2022-08-15

## 2022-08-15 RX ORDER — SODIUM CHLORIDE 9 MG/ML
125 INJECTION, SOLUTION INTRAVENOUS CONTINUOUS
Status: DISCONTINUED | OUTPATIENT
Start: 2022-08-15 | End: 2022-08-15 | Stop reason: HOSPADM

## 2022-08-15 RX ORDER — CIPROFLOXACIN 500 MG/1
500 TABLET, FILM COATED ORAL 2 TIMES DAILY
Qty: 28 TABLET | Refills: 0 | Status: SHIPPED | OUTPATIENT
Start: 2022-08-15 | End: 2022-09-08

## 2022-08-15 RX ADMIN — SODIUM CHLORIDE 500 ML: 0.9 INJECTION, SOLUTION INTRAVENOUS at 12:17

## 2022-08-15 RX ADMIN — IOPAMIDOL 100 ML: 755 INJECTION, SOLUTION INTRAVENOUS at 12:46

## 2022-08-15 RX ADMIN — SODIUM CHLORIDE 500 ML: 9 INJECTION, SOLUTION INTRAVENOUS at 15:27

## 2022-08-15 RX ADMIN — LEVOFLOXACIN 500 MG: 500 INJECTION, SOLUTION INTRAVENOUS at 15:30

## 2022-08-15 NOTE — ED PROVIDER NOTES
Subjective   This patient is an 86-year-old gentleman who has been suffering some weakness and difficulty walking for some weeks to months and as a consequence he started getting rehab over the last week or so.  He was being taken to rehab by his wife today when he appeared to stumble and become weak.  He did not exactly fall as his wife helped him to the ground but it was not the most restful or gentle of events.  She is not sure if he banged his head and the patient has Alzheimer's so is very difficult to get a coherent history from him.          Review of Systems   Neurological: Positive for weakness.   All other systems reviewed and are negative.      Past Medical History:   Diagnosis Date   • Alzheimer disease (HCC)    • BCC (basal cell carcinoma)     right ear   • Cancer (HCC)     skin   • Hypertension    • Short-term memory loss    • Trigger finger    • Trigger finger, left middle finger 12/21/2016   • Wears dentures    • Wears glasses        Allergies   Allergen Reactions   • Ace Inhibitors Rash   • Midazolam Rash     Patient already has memory issues and does not want versed.   • Penicillins Rash       Past Surgical History:   Procedure Laterality Date   • LAPAROSCOPIC CHOLECYSTECTOMY     • TRIGGER FINGER RELEASE Left     and on right hand   • TRIGGER FINGER RELEASE Left 5/12/2017    Procedure: LEFT RING FINGER TRIGGER RELEASE;  Surgeon: Tyler Godwin MD;  Location: [facility] OR;  Service:        History reviewed. No pertinent family history.    Social History     Socioeconomic History   • Marital status:    Tobacco Use   • Smoking status: Never Smoker   • Smokeless tobacco: Never Used   Vaping Use   • Vaping Use: Never used   Substance and Sexual Activity   • Alcohol use: No   • Drug use: No   • Sexual activity: Not Currently           Objective   Physical Exam  Vitals and nursing note reviewed.   Constitutional:       Appearance: He is well-developed.   HENT:      Head: Normocephalic and atraumatic.       Right Ear: External ear normal.      Left Ear: External ear normal.      Nose: Nose normal.   Eyes:      Extraocular Movements: Extraocular movements intact.      Conjunctiva/sclera: Conjunctivae normal.   Cardiovascular:      Rate and Rhythm: Normal rate and regular rhythm.      Pulses: Normal pulses.      Heart sounds: Normal heart sounds.   Pulmonary:      Effort: Pulmonary effort is normal.      Breath sounds: Normal breath sounds.   Abdominal:      General: Bowel sounds are normal.      Palpations: Abdomen is soft.   Musculoskeletal:         General: Normal range of motion.      Cervical back: Normal range of motion and neck supple.   Skin:     General: Skin is warm and dry.      Capillary Refill: Capillary refill takes less than 2 seconds.   Neurological:      General: No focal deficit present.      Mental Status: He is alert. Mental status is at baseline.   Psychiatric:         Behavior: Behavior normal.         Thought Content: Thought content normal.         Judgment: Judgment normal.         Procedures           ED Course                                           MDM  Number of Diagnoses or Management Options     Amount and/or Complexity of Data Reviewed  Clinical lab tests: reviewed and ordered  Tests in the radiology section of CPT®: reviewed and ordered  Tests in the medicine section of CPT®: reviewed and ordered  Decide to obtain previous medical records or to obtain history from someone other than the patient: yes    Patient Progress  Patient progress: stable      Final diagnoses:   Urinary tract infection without hematuria, site unspecified   Weakness       ED Disposition  ED Disposition     ED Disposition   Discharge    Condition   Stable    Comment   --             Jacklyn Lu DO  [address] [place] [address] [id number] [phone]               Medication List      New Prescriptions    ciprofloxacin 500 MG tablet  Commonly known as: CIPRO  Take 1 tablet by mouth 2 (Two) Times a  "Day.           Where to Get Your Medications      Information about where to get these medications is not yet available    Ask your nurse or doctor about these medications  · ciprofloxacin 500 MG tablet       The patient's work-up reveals a UTI which may be exacerbating his underlying weakness.  I will give him a dose of IV antibiotics in the ED and send him home with oral antibiotics.  His wife knows to bring him back for any worsening of symptoms.  Rest of his work-up did not demonstrate any acute injury from his \"fall\".  The patient is stable for discharge.     Max Villalobos MD  08/15/22 1524    "

## 2022-08-15 NOTE — ED NOTES
The following fall interventions were initiated:    [x] Patient and/or family given education   [x] Call light within reach and educated on how to use   [x] Bed rails up per protocol    [x] Bed locked and in the lowest position   [x] Bed alarm set and on loudest setting   [] Fall wrist band applied   [x] Non skid footwear applied   [x] Room free of clutter   [x] Patient items within reach   [x] Adequate lighting provided  [x] Falls sign present    [] Patient moved closer to nursing station   [] Restraints applied

## 2022-08-16 LAB
BACTERIA BLD CULT: ABNORMAL
BOTTLE TYPE: ABNORMAL
QT INTERVAL: 418 MS
QTC INTERVAL: 392 MS

## 2022-08-17 ENCOUNTER — TELEPHONE (OUTPATIENT)
Dept: EMERGENCY DEPT | Facility: HOSPITAL | Age: 87
End: 2022-08-17

## 2022-08-17 LAB — BACTERIA SPEC AEROBE CULT: ABNORMAL

## 2022-08-18 LAB
BACTERIA SPEC AEROBE CULT: ABNORMAL
GRAM STN SPEC: ABNORMAL
GRAM STN SPEC: ABNORMAL
ISOLATED FROM: ABNORMAL

## 2022-08-20 LAB — BACTERIA SPEC AEROBE CULT: NORMAL

## 2022-09-08 ENCOUNTER — HOSPITAL ENCOUNTER (OUTPATIENT)
Dept: GENERAL RADIOLOGY | Facility: HOSPITAL | Age: 87
Discharge: HOME | End: 2022-09-08
Admitting: FAMILY MEDICINE

## 2022-09-08 ENCOUNTER — OFFICE VISIT (OUTPATIENT)
Dept: INTERNAL MEDICINE | Facility: CLINIC | Age: 87
End: 2022-09-08

## 2022-09-08 VITALS
TEMPERATURE: 97.1 F | HEIGHT: 70 IN | DIASTOLIC BLOOD PRESSURE: 70 MMHG | BODY MASS INDEX: 25.05 KG/M2 | SYSTOLIC BLOOD PRESSURE: 116 MMHG | HEART RATE: 60 BPM | OXYGEN SATURATION: 98 % | WEIGHT: 175 LBS

## 2022-09-08 DIAGNOSIS — M25.511 ACUTE PAIN OF RIGHT SHOULDER: Primary | ICD-10-CM

## 2022-09-08 DIAGNOSIS — M25.511 ACUTE PAIN OF RIGHT SHOULDER: ICD-10-CM

## 2022-09-08 DIAGNOSIS — R41.3 MEMORY LOSS: ICD-10-CM

## 2022-09-08 PROCEDURE — 73030 X-RAY EXAM OF SHOULDER: CPT

## 2022-09-08 PROCEDURE — 99214 OFFICE O/P EST MOD 30 MIN: CPT | Performed by: FAMILY MEDICINE

## 2022-09-08 NOTE — PROGRESS NOTES
"        Subjective     Chief Complaint   Patient presents with   • Shoulder Pain     Right shoulder pain, noticed about a month ago after a \"fall\" more of sit down on the ground hurts to lift arm    • Fatigue     Sleeps a lot    • wants order for speech therapy        History of Present Illness     The patient presents today for right shoulder pain. He is accompanied by an adult female.    Fall  The adult female states about a month ago she was taking the patient to be seen for an urinary tract infection. She states the patient had a fall getting into the car. She states an x-ray of his back, neck and head was done.    Right shoulder   The patient is noted to have right shoulder discomfort moving his right arm.    Health maintenance  The patient denies any problems eating and drinking. He is noted to sleep frequently. The adult female states the patient's ambulating has improved with physical therapy.      Patient's PMR from outside medical facility reviewed and noted.    Review of Systems     Otherwise complete ROS reviewed and negative except as mentioned in the HPI.    Past Medical History:   Past Medical History:   Diagnosis Date   • Alzheimer disease (HCC)    • BCC (basal cell carcinoma)     right ear   • Cancer (HCC)     skin   • Hypertension    • Short-term memory loss    • Trigger finger    • Trigger finger, left middle finger 12/21/2016   • Wears dentures    • Wears glasses      Past Surgical History:  Past Surgical History:   Procedure Laterality Date   • LAPAROSCOPIC CHOLECYSTECTOMY     • TRIGGER FINGER RELEASE Left     and on right hand   • TRIGGER FINGER RELEASE Left 5/12/2017    Procedure: LEFT RING FINGER TRIGGER RELEASE;  Surgeon: Tyler Godwin MD;  Location: [facility];  Service:      Social History:  reports that he has never smoked. He has never used smokeless tobacco. He reports that he does not drink alcohol and does not use drugs.    Family History: family history is not on file.  " "    Allergies:  Allergies   Allergen Reactions   • Ace Inhibitors Rash   • Midazolam Rash     Patient already has memory issues and does not want versed.   • Penicillins Rash     Medications:  Prior to Admission medications    Medication Sig Start Date End Date Taking? Authorizing Provider   amLODIPine (NORVASC) 5 MG tablet Take 1 tablet by mouth Daily. 5/23/22  Yes Jacklyn Lu DO   ciprofloxacin (CIPRO) 500 MG tablet Take 1 tablet by mouth 2 (Two) Times a Day. 8/15/22 9/8/22  Max Villalobos MD   Mirabegron ER (Myrbetriq) 25 MG tablet sustained-release 24 hour 24 hr tablet Take 1 tablet by mouth Daily. 5/5/22 9/8/22  Tima Camacho MD       Objective     Vital Signs: /70 (BP Location: Left arm, Patient Position: Sitting, Cuff Size: Adult)   Pulse 60   Temp 97.1 °F (36.2 °C) (Temporal)   Ht 177.8 cm (70\")   Wt 79.4 kg (175 lb)   SpO2 98%   BMI 25.11 kg/m²   Physical Exam  Vitals and nursing note reviewed.   Constitutional:       Appearance: Normal appearance.   HENT:      Head: Normocephalic and atraumatic.      Right Ear: External ear normal.      Left Ear: External ear normal.      Nose: Nose normal.      Mouth/Throat:      Mouth: Mucous membranes are moist.   Eyes:      Extraocular Movements: Extraocular movements intact.      Conjunctiva/sclera: Conjunctivae normal.      Pupils: Pupils are equal, round, and reactive to light.   Cardiovascular:      Rate and Rhythm: Normal rate and regular rhythm.      Pulses: Normal pulses.      Heart sounds: No murmur heard.    No friction rub. No gallop.   Pulmonary:      Effort: Pulmonary effort is normal.      Breath sounds: Normal breath sounds.   Abdominal:      General: Bowel sounds are normal.      Palpations: Abdomen is soft.   Musculoskeletal:      Cervical back: Normal range of motion and neck supple.      Comments: Patient has difficulty raising right arm to level of shoulder.  He does wince with this.  With passive range of motion he " also has difficulty raising the arm past shoulder height secondary to pain.  Left arm removed without difficulty is able to raise it above his head.  There is no marked tenderness to palpation over the AC joint or the scapula or the glenohumeral head.   Skin:     General: Skin is warm and dry.      Capillary Refill: Capillary refill takes less than 2 seconds.   Neurological:      General: No focal deficit present.      Mental Status: He is alert.      Cranial Nerves: No cranial nerve deficit.      Comments: Patient is alert but is not oriented to place or time.  He does answer to his name when his wife talks to him.   Psychiatric:         Mood and Affect: Mood normal.         Behavior: Behavior normal.         BMI is >= 25 and <30. (Overweight) The following options were offered after discussion;: none (medical contraindication)      Results Reviewed:  Glucose   Date Value Ref Range Status   08/15/2022 92 65 - 99 mg/dL Final     BUN   Date Value Ref Range Status   08/15/2022 10 8 - 23 mg/dL Final     Creatinine   Date Value Ref Range Status   08/15/2022 1.11 0.76 - 1.27 mg/dL Final     Sodium   Date Value Ref Range Status   08/15/2022 140 136 - 145 mmol/L Final     Potassium   Date Value Ref Range Status   08/15/2022 3.8 3.5 - 5.2 mmol/L Final     Chloride   Date Value Ref Range Status   08/15/2022 104 98 - 107 mmol/L Final     CO2   Date Value Ref Range Status   08/15/2022 27.0 22.0 - 29.0 mmol/L Final     Calcium   Date Value Ref Range Status   08/15/2022 9.7 8.6 - 10.5 mg/dL Final     ALT (SGPT)   Date Value Ref Range Status   08/15/2022 13 1 - 41 U/L Final     AST (SGOT)   Date Value Ref Range Status   08/15/2022 16 1 - 40 U/L Final     WBC   Date Value Ref Range Status   08/15/2022 4.08 3.40 - 10.80 10*3/mm3 Final   04/15/2021 7.23 3.40 - 10.80 10*3/mm3 Final     Hematocrit   Date Value Ref Range Status   08/15/2022 36.9 (L) 37.5 - 51.0 % Final     Platelets   Date Value Ref Range Status   08/15/2022 226 140 -  450 10*3/mm3 Final     Total Cholesterol   Date Value Ref Range Status   10/11/2021 161 130 - 200 mg/dL Final     Triglycerides   Date Value Ref Range Status   10/11/2021 84 0 - 149 mg/dL Final     HDL Cholesterol   Date Value Ref Range Status   10/11/2021 48 >=40 mg/dL Final     LDL Cholesterol    Date Value Ref Range Status   10/11/2021 97 0 - 99 mg/dL Final     LDL Chol Calc (NIH)   Date Value Ref Range Status   10/22/2020 92 0 - 100 mg/dL Final     LDL/HDL Ratio   Date Value Ref Range Status   10/11/2021 2.00  Final     Hemoglobin A1C   Date Value Ref Range Status   04/28/2022 5.6 % Final         Assessment / Plan     Assessment/Plan:  1. Acute pain of right shoulder    - XR Shoulder 2+ View Right; Future  - Ambulatory Referral to Orthopedic Surgery    2. Memory loss    - Ambulatory Referral to Speech Therapy          Plan:   We are going to x-ray the shoulder. If it is negative, we are going to refer over to Orthopedics. If it is positive, we will refer over to Orthopedics.  For his memory loss, his wife wants a referral over to speech therapy at the rehab they are going to, which is [facility], which we are good with that. He will follow up for his next scheduled appointment to keep that.      Return in about 3 months (around 12/8/2022). unless patient needs to be seen sooner or acute issues arise.      I have discussed the patient results/orders and and plan/recommendation with them at today's visit.        Transcribed from ambient dictation for Jacklyn Lu DO by SIS ARAUJO.  09/08/22   13:04 CDT    Patient verbalized consent to the visit recording.

## 2022-10-17 ENCOUNTER — OFFICE VISIT (OUTPATIENT)
Dept: NEUROLOGY | Age: 87
End: 2022-10-17
Payer: MEDICARE

## 2022-10-17 VITALS
DIASTOLIC BLOOD PRESSURE: 85 MMHG | BODY MASS INDEX: 27.11 KG/M2 | HEIGHT: 69 IN | SYSTOLIC BLOOD PRESSURE: 143 MMHG | HEART RATE: 64 BPM | WEIGHT: 183 LBS

## 2022-10-17 DIAGNOSIS — G20 PARKINSONISM, UNSPECIFIED PARKINSONISM TYPE (HCC): ICD-10-CM

## 2022-10-17 DIAGNOSIS — R41.3 MEMORY LOSS: ICD-10-CM

## 2022-10-17 DIAGNOSIS — R27.0 ATAXIA: Primary | ICD-10-CM

## 2022-10-17 PROCEDURE — 1123F ACP DISCUSS/DSCN MKR DOCD: CPT | Performed by: PSYCHIATRY & NEUROLOGY

## 2022-10-17 PROCEDURE — 99204 OFFICE O/P NEW MOD 45 MIN: CPT | Performed by: PSYCHIATRY & NEUROLOGY

## 2022-10-17 NOTE — PROGRESS NOTES
Chief Complaint   Patient presents with    Follow-up     Possible parkinson, having trouble walking and issues with balance        Hedy Talley is a 82y.o. year old male who is seen for evaluation of possible Parkinson's disease. Patient last seen here in the office over 3 years ago, 9/19, for probable Alzheimer's disease. B12, TSH and MRI of the head were unremarkable. He was on Namzeric at that time. No longer on either 1 of those drugs. Those records are reviewed. He has been getting physical therapy recently and there was some concern he could have Parkinson's disease. He has had a couple of CTs in the past year suggesting NPH. This was not present on his previous MRI. Active Ambulatory Problems     Diagnosis Date Noted    Anal bleeding 08/23/2013    Rectal itching 08/23/2013    History of colon polyps 08/23/2013    Trigger finger, left middle finger 12/21/2016     Resolved Ambulatory Problems     Diagnosis Date Noted    No Resolved Ambulatory Problems     Past Medical History:   Diagnosis Date    Depression     Hypertension     Memory difficulty     Osteoarthritis     Trigger finger     Urinary incontinence        Past Surgical History:   Procedure Laterality Date    CERVICAL DISC SURGERY      COLONOSCOPY  8-    BODNARCHUK    FINGER TRIGGER RELEASE Left 12/21/2016    MIDDLE FINGER TRIGGER RELEASE performed by Brandin Benitez MD at [address]      RECTAL NODULE REMOVAL    RECTAL SURGERY      FISSURE REPAIR    TONSILLECTOMY AND ADENOIDECTOMY         Family History   Problem Relation Age of Onset    Colon Cancer Neg Hx     Colon Polyps Neg Hx        Allergies   Allergen Reactions    Ramipril     Versed [Midazolam]      Patient already has memory issues and does not want versed.        Social History     Socioeconomic History    Marital status:      Spouse name: Not on file    Number of children: Not on file    Years of education: Not on file    Highest education level: Not on file   Occupational History    Not on file   Tobacco Use    Smoking status: Never    Smokeless tobacco: Never   Substance and Sexual Activity    Alcohol use: No    Drug use: No    Sexual activity: Not on file   Other Topics Concern    Not on file   Social History Narrative    Not on file     Social Determinants of Health     Financial Resource Strain: Not on file   Food Insecurity: Not on file   Transportation Needs: Not on file   Physical Activity: Not on file   Stress: Not on file   Social Connections: Not on file   Intimate Partner Violence: Not on file   Housing Stability: Not on file       Review of Systems  Constitutional - No fever or chills. No diaphoresis or significant fatigue. HENT -  No tinnitus or significant hearing loss. Eyes - no sudden vision change or eye pain  Respiratory - no significant shortness of breath or cough  Cardiovascular - no chest pain No palpitations or significant leg swelling  Gastrointestinal - no abdominal swelling or pain. Genitourinary - No difficulty urinating, dysuria  Musculoskeletal - no back pain or myalgia. Skin - no color change or rash  Neurologic - No seizures. No lateralizing weakness. Hematologic - yes easy bruising or excessive bleeding. Psychiatric - yes severe anxiety or nervousness. All other review of systems are negative. Current Outpatient Medications   Medication Sig Dispense Refill    carbidopa-levodopa (SINEMET)  MG per tablet Take 1/2 pill each am and afternoon for a week, then 1 each am and afternoon 90 tablet 3    amLODIPine (NORVASC) 5 MG tablet Take 5 mg by mouth daily        No current facility-administered medications for this visit.        Outpatient Medications Marked as Taking for the 10/17/22 encounter (Office Visit) with Hannah Rolon MD   Medication Sig Dispense Refill    carbidopa-levodopa (SINEMET)  MG per tablet Take 1/2 pill each am and afternoon for a week, then 1 each am and afternoon 90 tablet 3    amLODIPine (NORVASC) 5 MG tablet Take 5 mg by mouth daily          BP (!) 143/85   Pulse 64   Ht 5' 9\" (1.753 m)   Wt 183 lb (83 kg)   BMI 27.02 kg/m²       Constitutional - well developed, well nourished. Eyes - conjunctiva normal.  Pupils react to light  Ear, nose, throat -hearing intact to finger rub No scars, masses, or lesions over external nose or ears, no atrophy of tongue  Neck-symmetric, no masses noted, no jugular vein distension. No bruits noted. Respiration- chest wall appears symmetric, good expansion,   normal effort without use of accessory muscles  Cardiovascular- RRR  Musculoskeletal - no significant wasting of muscles noted, no bony deformities, gait no gross ataxia  Extremities-no clubbing, cyanosis or edema  Skin - warm, dry, and intact. No rash, erythema, or pallor. Psychiatric - mood, affect, and behavior appear normal.      Neurological exam  Awake, alert, fluent but with word finding difficulties. Frontal release signs are present. Cranial Nerve Exam   CN II- Visual fields grossly unremarkable. VA adequate  CN III, IV,VI- PERRLA, EOMI, No nystagmus, conjugate eye movements, no ptosis  CN VII-no facial asymmetry. Stare and masked face noted.   CN VIII-Hearing intact   CN IX and X- Palate elevates in midline  CN XI-good shoulder shrug  CN XII-Tongue midline with no fasciculations or fibrillations    Motor Exam  V/V throughout upper and lower extremities bilaterally, has cogwheeling    Sensory Exam  Sensation intact to light touch , PP  upper and lower extremities bilaterally; normal vibration sense in LE's    Reflexes   2+ biceps bilaterally  2+ brachioradialis  2+ triceps  2+patella    Tremors- no tremors in hands or head noted    Gait unsteady with shuffling    Coordination  Finger to nose and AMERICA-unremarkable    No results found for: [id number]  Lab Results   Component Value Date    WBC 11.64 (H) 05/20/2015    HGB 12.4 (L) 05/20/2015    HCT 36.5 (L) 05/20/2015 MCV 88.4 05/20/2015     05/20/2015     Lab Results   Component Value Date     12/15/2016    K 4.3 12/15/2016     12/15/2016    CO2 27 12/15/2016    BUN 18 12/15/2016    CREATININE 1.0 12/15/2016    GLUCOSE 95 12/15/2016    CALCIUM 9.8 12/15/2016    LABGLOM >60 12/15/2016           Assessment    ICD-10-CM    1. Ataxia  R27.0       2. Parkinsonism, unspecified Parkinsonism type ([facility] [address]  G20       3. Memory loss  R41.3           Patient does have parkinsonism with a rather severe dementia. There was no concern for NPH when his cognitive difficulties began but radiology is reading his recent CT as possible NPH. I have suggested a lumbar puncture to see if his gait improves although his cognition would almost certainly not. His wife is going to think this over and in the meantime he will be given an empiric trial of Sinemet. Plan          Orders Placed This Encounter   Medications    carbidopa-levodopa (SINEMET)  MG per tablet     Sig: Take 1/2 pill each am and afternoon for a week, then 1 each am and afternoon     Dispense:  90 tablet     Refill:  3       Pt warned of potential side effects of medication changes/new medicines. They are to call for new or ongoing difficulties. Return in about 4 weeks (around 11/14/2022).     (Please note that portions of this note were completed with a voice recognition program. Efforts were made to edit the dictations but occasionally words are mis-transcribed.)

## 2022-10-20 ENCOUNTER — TELEPHONE (OUTPATIENT)
Dept: INTERNAL MEDICINE | Facility: CLINIC | Age: 87
End: 2022-10-20

## 2022-10-20 NOTE — TELEPHONE ENCOUNTER
Tricia Frausto called and stated that she would like a callback from Nurse or PCP    Notes: Requesting orders for home health / services as follows: Physical Therapy, Speech Therapy, Occupational Therapy    Christy is requested.     Please advise with callback @ [phone]    Thank you,  Norris Thoams, PCT

## 2022-10-20 NOTE — TELEPHONE ENCOUNTER
Pt has been receiving OP therapy at [facility] and they told wife today that they feel he would do better with in-home therapy services, so that is what she is requesting.  Ok to refer to [facility]?  I did tell her that I think he has to have been seen in the office within the last 30 days, so we may have to see him for that purpose if so.

## 2022-10-24 ENCOUNTER — TELEPHONE (OUTPATIENT)
Dept: NEUROLOGY | Age: 87
End: 2022-10-24

## 2022-10-24 DIAGNOSIS — R27.0 ATAXIA: Primary | ICD-10-CM

## 2022-10-24 NOTE — TELEPHONE ENCOUNTER
Mr. Ricardo Pat wife called and said empiric trial of Sinemet was working well and theyr'e needing a scrip with the increase you discussed with them. Patients wife also wants to proceed with lumbar puncture.           Please advise

## 2022-10-25 NOTE — TELEPHONE ENCOUNTER
Pt's wife called and asked why she hasn't received a response yet. I told her Dr. Zeinab Arellano sent in a script for the Sinemet to [address] [place] [address] and the pt is to take one pill twice daily. One in the morning and one in the afternoon. Wife states understanding.

## 2022-10-27 ENCOUNTER — OFFICE VISIT (OUTPATIENT)
Dept: INTERNAL MEDICINE | Facility: CLINIC | Age: 87
End: 2022-10-27

## 2022-10-27 VITALS
HEART RATE: 61 BPM | HEIGHT: 70 IN | BODY MASS INDEX: 25.77 KG/M2 | TEMPERATURE: 97.1 F | DIASTOLIC BLOOD PRESSURE: 80 MMHG | SYSTOLIC BLOOD PRESSURE: 130 MMHG | OXYGEN SATURATION: 98 % | WEIGHT: 180 LBS

## 2022-10-27 DIAGNOSIS — R27.0 ATAXIA: ICD-10-CM

## 2022-10-27 DIAGNOSIS — G20 PARKINSON DISEASE: ICD-10-CM

## 2022-10-27 DIAGNOSIS — G30.1 LATE ONSET ALZHEIMER'S DEMENTIA WITHOUT BEHAVIORAL DISTURBANCE: Primary | ICD-10-CM

## 2022-10-27 DIAGNOSIS — Z91.81 AT HIGH RISK FOR FALLS: ICD-10-CM

## 2022-10-27 DIAGNOSIS — F02.80 LATE ONSET ALZHEIMER'S DEMENTIA WITHOUT BEHAVIORAL DISTURBANCE: Primary | ICD-10-CM

## 2022-10-27 DIAGNOSIS — F80.9 SPEECH DELAY: ICD-10-CM

## 2022-10-27 DIAGNOSIS — R63.39 DIFFICULTY FEEDING SELF: ICD-10-CM

## 2022-10-27 PROCEDURE — 99213 OFFICE O/P EST LOW 20 MIN: CPT

## 2022-10-27 PROCEDURE — G0008 ADMIN INFLUENZA VIRUS VAC: HCPCS

## 2022-10-27 PROCEDURE — 90662 IIV NO PRSV INCREASED AG IM: CPT

## 2022-10-27 NOTE — PROGRESS NOTES
Subjective   Claudio Frausto is a 86 y.o. male.   Chief Complaint   Patient presents with   • Home Health Care     Requesting home health for PT/speech/occupational therapy       History of Present Illness   Mr. Frausto presents here today to discuss getting home health.  Wife presents with him here today, reports that she has been taking him to outpatient therapy.  States that it is very difficult to safely get him in the car and on the car and make appointments, states that she often has to miss appointments if it is raining outside or the weather is not ideal.  Mr. Frausto is under current management for Parkinson's, dementia.  Has ataxia, difficulty formulating words, has difficulty feeding himself.  Wife would like for him to continue with therapy to prevent falls, has not had any recent falls, they are careful and utilize gait belt.  Denies any other concerns or issues today.    The following portions of the patient's history were reviewed and updated as appropriate: allergies, current medications, past family history, past medical history, past social history, past surgical history and problem list.    Review of Systems    Objective   Past Medical History:   Diagnosis Date   • Alzheimer disease (HCC)    • BCC (basal cell carcinoma)     right ear   • Cancer (HCC)     skin   • Hypertension    • Short-term memory loss    • Trigger finger    • Trigger finger, left middle finger 12/21/2016   • Wears dentures    • Wears glasses       Past Surgical History:   Procedure Laterality Date   • LAPAROSCOPIC CHOLECYSTECTOMY     • TRIGGER FINGER RELEASE Left     and on right hand   • TRIGGER FINGER RELEASE Left 5/12/2017    Procedure: LEFT RING FINGER TRIGGER RELEASE;  Surgeon: Tyler Godwin MD;  Location: [facility];  Service:         Current Outpatient Medications:   •  amLODIPine (NORVASC) 5 MG tablet, Take 1 tablet by mouth Daily., Disp: 90 tablet, Rfl: 3  •  carbidopa-levodopa (SINEMET)  MG per tablet, Take 1 each  "am and afternoon, Disp: , Rfl:       /80 (BP Location: Left arm, Patient Position: Sitting, Cuff Size: Adult)   Pulse 61   Temp 97.1 °F (36.2 °C) (Temporal)   Ht 177.8 cm (70\")   Wt 81.6 kg (180 lb)   SpO2 98%   BMI 25.83 kg/m²      Body mass index is 25.83 kg/m².         Physical Exam  Vitals and nursing note reviewed.   Constitutional:       Appearance: Normal appearance.   HENT:      Head: Normocephalic and atraumatic.   Eyes:      Extraocular Movements: Extraocular movements intact.      Conjunctiva/sclera: Conjunctivae normal.      Pupils: Pupils are equal, round, and reactive to light.   Cardiovascular:      Rate and Rhythm: Normal rate and regular rhythm.      Pulses: Normal pulses.      Heart sounds: Normal heart sounds.   Pulmonary:      Effort: Pulmonary effort is normal.      Breath sounds: Normal breath sounds.   Musculoskeletal:         General: Normal range of motion.      Cervical back: Normal range of motion and neck supple.      Right lower leg: No edema.      Left lower leg: No edema.   Skin:     General: Skin is warm and dry.      Capillary Refill: Capillary refill takes less than 2 seconds.   Neurological:      Mental Status: He is alert. Mental status is at baseline.      Motor: Weakness present.      Coordination: Coordination normal.      Gait: Gait abnormal (shuffling gait, gait belt on).   Psychiatric:         Mood and Affect: Mood normal.         Behavior: Behavior normal.         Thought Content: Thought content normal.         Judgment: Judgment normal.               Assessment & Plan   Diagnoses and all orders for this visit:    1. Late onset Alzheimer's dementia without behavioral disturbance (HCC) (Primary)  -     Ambulatory Referral to Home Health    2. Parkinson disease (HCC)  -     Ambulatory Referral to Home Health    3. Ataxia  -     Ambulatory Referral to Home Health    4. At high risk for falls  -     Ambulatory Referral to Home Health    5. Speech delay  -     " Ambulatory Referral to Home Health    6. Difficulty feeding self  -     Ambulatory Referral to Home Health    Other orders  -     Fluzone High-Dose 65+yrs               Plan of care reviewed with Mr. Frausto and his wife  We will refer to home health via [facility] as requested.   I believe that he would most certainly benefit from getting home health physical therapy, Occupational Therapy, speech therapy.  Has obvious speech delay and difficulty getting words out.  Gait shuffling, high risk for falls.  High risk of injury with having to get out of his house and make outpatient therapy appointments, also feel that he would benefit from getting therapy in his familiar environment.

## 2022-11-03 ENCOUNTER — LAB (OUTPATIENT)
Dept: INTERNAL MEDICINE | Facility: CLINIC | Age: 87
End: 2022-11-03

## 2022-11-03 ENCOUNTER — HOSPITAL ENCOUNTER (OUTPATIENT)
Dept: GENERAL RADIOLOGY | Age: 87
Discharge: HOME | End: 2022-11-03
Payer: MEDICARE

## 2022-11-03 VITALS
SYSTOLIC BLOOD PRESSURE: 119 MMHG | OXYGEN SATURATION: 97 % | HEIGHT: 69 IN | BODY MASS INDEX: 27.11 KG/M2 | DIASTOLIC BLOOD PRESSURE: 62 MMHG | WEIGHT: 183 LBS | HEART RATE: 65 BPM | TEMPERATURE: 98.3 F | RESPIRATION RATE: 16 BRPM

## 2022-11-03 DIAGNOSIS — E78.5 HYPERLIPIDEMIA, UNSPECIFIED HYPERLIPIDEMIA TYPE: ICD-10-CM

## 2022-11-03 DIAGNOSIS — Z79.899 ENCOUNTER FOR LONG-TERM CURRENT USE OF MEDICATION: ICD-10-CM

## 2022-11-03 DIAGNOSIS — E11.65 TYPE 2 DIABETES MELLITUS WITH HYPERGLYCEMIA, UNSPECIFIED WHETHER LONG TERM INSULIN USE: ICD-10-CM

## 2022-11-03 DIAGNOSIS — I10 PRIMARY HYPERTENSION: Primary | ICD-10-CM

## 2022-11-03 DIAGNOSIS — R27.0 ATAXIA: ICD-10-CM

## 2022-11-03 DIAGNOSIS — E03.9 HYPOTHYROIDISM, UNSPECIFIED TYPE: ICD-10-CM

## 2022-11-03 LAB
APPEARANCE CSF: CLEAR
CLOT EVALUATION CSF: ABNORMAL
COLOR CSF: ABNORMAL
EOS CSF: 1 %
GLUCOSE, CSF: 49 MG/DL (ref 40–70)
INR BLD: 1.05 (ref 0.88–1.18)
LYMPHS CSF: 37 % (ref 40–80)
MONOCYTE, CSF: 20 % (ref 15–45)
NEUTROPHILS, CSF: 42 % (ref 0–6)
PLATELET # BLD: 247 K/UL (ref 130–400)
PROTEIN CSF: 356 MG/DL (ref 15–45)
PROTHROMBIN TIME: 13.6 SEC (ref 12–14.6)
RBC CSF: 5767 /CUMM (ref 0–5)
WBC CSF: 356 /CUMM (ref 0–8)

## 2022-11-03 PROCEDURE — 87075 CULTR BACTERIA EXCEPT BLOOD: CPT

## 2022-11-03 PROCEDURE — 87205 SMEAR GRAM STAIN: CPT

## 2022-11-03 PROCEDURE — 62328 DX LMBR SPI PNXR W/FLUOR/CT: CPT

## 2022-11-03 PROCEDURE — 88112 CYTOPATH CELL ENHANCE TECH: CPT

## 2022-11-03 PROCEDURE — 84157 ASSAY OF PROTEIN OTHER: CPT

## 2022-11-03 PROCEDURE — 87070 CULTURE OTHR SPECIMN AEROBIC: CPT

## 2022-11-03 PROCEDURE — 82945 GLUCOSE OTHER FLUID: CPT

## 2022-11-03 PROCEDURE — 86592 SYPHILIS TEST NON-TREP QUAL: CPT

## 2022-11-03 PROCEDURE — 85049 AUTOMATED PLATELET COUNT: CPT

## 2022-11-03 PROCEDURE — 85610 PROTHROMBIN TIME: CPT

## 2022-11-03 PROCEDURE — 89050 BODY FLUID CELL COUNT: CPT

## 2022-11-03 ASSESSMENT — PAIN - FUNCTIONAL ASSESSMENT: PAIN_FUNCTIONAL_ASSESSMENT: NONE - DENIES PAIN

## 2022-11-03 NOTE — PROGRESS NOTES
Joby Ruiz reported only 1 cc of CSF was able to be withdrawn. They attempted 3 different location and 2 returned blood with csf. Dr Sanchez Chu the radiologist is calling Dr Mejia Russell to inform him of the small amount of csf collected, but the opening pressure was only 14. Awaiting call from Dr Mejia Russell for which labs are priority in case all are not able to be performed. Patient has no complatints of pain on return to [facility]. Puncture sites x 3 all clean and dry. Spouse at bedside.

## 2022-11-03 NOTE — PROGRESS NOTES
Talked with Dr Ba Sanchez and Lindy Delong and notified them of the amount of csf received and he stated the lab priority is the cell count with diff. I reported it to Tez Fuentes in lab. I told them to do the cell count and then after that to do the rest as they can with remaining csf fluid. Patient is recovering well. He had lunch and has no complaints. Vitals stable.

## 2022-11-03 NOTE — PROGRESS NOTES
Mr Nelly Kowalski is here today for a lumbar puncture ordered per Dr Phyllis Blackwood for possible NPH. Patient is here with his spouse Mary Morris and the procedure was verified with the patient and his spouse. Patient was assisted to lobby from car by  due to the difficulty she observed the wife having with getting him into register. I went to bring them to [facility] with transfer chair and Marni and myself assisted the patient to chair. The patient movement was very unsteady, he does follow commands but his gait was slow and unsteady and he did not stand all the way straight, he stood with his buttox out as if he was going to sit. Patient was transported to room 2 in [facility]. H/P reviewed and assessment completed. Vitals stable. Labs drawn and sent. Orders reviewed and gram stain was ordered in which the lab had me change it to a culture. Procedure was explained to the spouse, the patient was very drowsy. Consent obtained by Mary Morris his spouse who also has power of . Discharge instructions also reviewed with her and she verbalized understanding of instruction.

## 2022-11-04 NOTE — BRIEF OP NOTE
Brief Postoperative Note    Renee Estrada  YOB: 1935  [id number]    Pre-operative Diagnosis: parkinsonism, altered mental status    Post-operative Diagnosis: Same    Procedure: lumbar puncture with measurements    Anesthesia: Local    Surgeons/Assistants: Humberto Smith MD    Estimated Blood Loss: 3 cc    Complications: none    Specimens: Was Obtained: 3 cc    Findings: 20 g needle utilized  Accessed at L2-3- opening pressure of 14 cm H20. Able to retreive only 3 cc clear fluid before no fluid would return despite manipulation. Accessed at L1-2- bloody tap. Accessed at L5-S1, also bloody tap. If more fluid is needed can return at a later date. Spoke to wife regarding the results.      Electronically signed by Humberto Smith MD on 11/4/2022 at 8:58 AM

## 2022-11-07 LAB — VDRL CSF SCREEN: NON REACTIVE

## 2022-11-10 ENCOUNTER — OFFICE VISIT (OUTPATIENT)
Dept: INTERNAL MEDICINE | Facility: CLINIC | Age: 87
End: 2022-11-10

## 2022-11-10 ENCOUNTER — REFERRAL TRIAGE (OUTPATIENT)
Dept: CASE MANAGEMENT | Facility: OTHER | Age: 87
End: 2022-11-10

## 2022-11-10 VITALS
TEMPERATURE: 98 F | OXYGEN SATURATION: 97 % | HEART RATE: 66 BPM | SYSTOLIC BLOOD PRESSURE: 130 MMHG | DIASTOLIC BLOOD PRESSURE: 74 MMHG | BODY MASS INDEX: 25.83 KG/M2 | HEIGHT: 70 IN

## 2022-11-10 DIAGNOSIS — R63.39 DIFFICULTY FEEDING SELF: ICD-10-CM

## 2022-11-10 DIAGNOSIS — E11.65 TYPE 2 DIABETES MELLITUS WITH HYPERGLYCEMIA, WITHOUT LONG-TERM CURRENT USE OF INSULIN: ICD-10-CM

## 2022-11-10 DIAGNOSIS — Z23 NEED FOR VACCINATION: ICD-10-CM

## 2022-11-10 DIAGNOSIS — R27.0 ATAXIA: ICD-10-CM

## 2022-11-10 DIAGNOSIS — I10 PRIMARY HYPERTENSION: ICD-10-CM

## 2022-11-10 DIAGNOSIS — F03.90 DEMENTIA WITHOUT BEHAVIORAL DISTURBANCE: ICD-10-CM

## 2022-11-10 DIAGNOSIS — Z91.81 AT HIGH RISK FOR FALLS: ICD-10-CM

## 2022-11-10 DIAGNOSIS — Z00.00 ENCOUNTER FOR SUBSEQUENT ANNUAL WELLNESS VISIT (AWV) IN MEDICARE PATIENT: Primary | ICD-10-CM

## 2022-11-10 DIAGNOSIS — E78.5 HYPERLIPIDEMIA, UNSPECIFIED HYPERLIPIDEMIA TYPE: ICD-10-CM

## 2022-11-10 PROBLEM — R73.01 IMPAIRED FASTING BLOOD SUGAR: Status: RESOLVED | Noted: 2020-10-22 | Resolved: 2022-11-10

## 2022-11-10 LAB
ANAEROBIC CULTURE: NORMAL
CSF CULTURE: NORMAL
GRAM STAIN RESULT: NORMAL

## 2022-11-10 PROCEDURE — 0124A PR ADM SARSCOV2 30MCG/0.3ML BST: CPT

## 2022-11-10 PROCEDURE — 91312 COVID-19 (PFIZER) BIVALENT BOOSTER 12+YRS: CPT

## 2022-11-10 PROCEDURE — 1126F AMNT PAIN NOTED NONE PRSNT: CPT

## 2022-11-10 PROCEDURE — G0439 PPPS, SUBSEQ VISIT: HCPCS

## 2022-11-10 PROCEDURE — 1170F FXNL STATUS ASSESSED: CPT

## 2022-11-10 PROCEDURE — 1160F RVW MEDS BY RX/DR IN RCRD: CPT

## 2022-11-10 PROCEDURE — 1159F MED LIST DOCD IN RCRD: CPT

## 2022-11-10 NOTE — PROGRESS NOTES
The ABCs of the Annual Wellness Visit  Subsequent Medicare Wellness Visit    Chief Complaint   Patient presents with   • Medicare Wellness-subsequent      Subjective    History of Present Illness:  Claudio Frausto is a 86 y.o. male who presents for a Subsequent Medicare Wellness Visit.  His wife Tricia brings him here today and answers majority of the questions.  Mr. Best demonstrates poor cooperation when requested to do things by myself, however does cooperate with his wife.  She reports that he had a recent spinal tap done by his neurologist Dr. Matta, states that she thinks he is wanting to confirm the diagnosis of Parkinson's.  She states that they had difficulty with the spinal tap and attempted x3, she is not aware the results yet states they have an appointment to discuss this next Thursday with Dr. Matta.  She reports that she has not yet heard from [facility] [facility], referral was placed at his last visit back on October 27, she specifically had requested [facility].  They are interested in having speech therapy, occupational therapy as well as physical therapy.  She is also requesting case management today so that they can utilize any community resources that may be available.  She reports that he had an eye exam done recently either in August or September.  She reports that he used to read with readers now he reads without assistance, never complains of a headache.  She does mention that he did have a headache after the spinal this was relieved with Tylenol.  Other than Tylenol he does not routinely use any over-the-counter medications.  Denies any issues with his blood pressures.  She states he never complains of chest pain or shortness of air.  Balance and mobility is the biggest issue currently as well as his memory.  Reports that he did fall getting out of the shower on Sunday, states that she simply turned around to get a towel and he was on the ground, denies any injuries that incurred a result of  this.  She states that he typically will sleep until about 10:00 in the morning thus he is little sleepy this morning.  Reports that he is still eating well has a good appetite.  Is fasting this morning for labs.    The following portions of the patient's history were reviewed and   updated as appropriate: allergies, current medications, past family history, past medical history, past social history, past surgical history and problem list.    Compared to one year ago, the patient feels his physical   health is worse.    Compared to one year ago, the patient feels his mental   health is the same.    Recent Hospitalizations:  He was not admitted to the hospital during the last year.       Current Medical Providers:  Patient Care Team:  Jacklyn Lu DO as PCP - General (Family Medicine)  Rose Pascual MD as Consulting Physician (Dermatology)    Outpatient Medications Prior to Visit   Medication Sig Dispense Refill   • amLODIPine (NORVASC) 5 MG tablet Take 1 tablet by mouth Daily. 90 tablet 3   • carbidopa-levodopa (SINEMET)  MG per tablet Take 1 each am and afternoon       No facility-administered medications prior to visit.       No opioid medication identified on active medication list. I have reviewed chart for other potential  high risk medication/s and harmful drug interactions in the elderly.          Aspirin is not on active medication list.  Aspirin use is not indicated based on review of current medical condition/s. Risk of harm outweighs potential benefits.  .    Patient Active Problem List   Diagnosis   • Actinic keratoses   • Anal fissure   • Hypertension   • Benign prostate hyperplasia   • Excessive sleepiness   • Memory loss   • PFO (patent foramen ovale)   • Type 2 diabetes mellitus with hyperglycemia (HCC)   • Hypothyroidism   • Hyperlipidemia   • Syncope   • Dementia without behavioral disturbance (HCC)     Advance Care Planning  Advance Directive is on file.  ACP discussion was held  "with the patient during this visit. Patient has an advance directive in EMR which is still valid.           Objective    Vitals:    11/10/22 0842   BP: 130/74   BP Location: Left arm   Patient Position: Sitting   Cuff Size: Adult   Pulse: 66   Temp: 98 °F (36.7 °C)   SpO2: 97%   Height: 177.8 cm (70\")     Estimated body mass index is 25.83 kg/m² as calculated from the following:    Height as of this encounter: 177.8 cm (70\").    Weight as of 10/27/22: 81.6 kg (180 lb).    BMI is >= 25 and <30. (Overweight) The following options were offered after discussion;: exercise counseling/recommendations and nutrition counseling/recommendations      Does the patient have evidence of cognitive impairment? Yes    Physical Exam  Vitals and nursing note reviewed.   Constitutional:       General: He is not in acute distress.     Appearance: Normal appearance. He is normal weight. He is not ill-appearing, toxic-appearing or diaphoretic.   HENT:      Head: Normocephalic and atraumatic.      Right Ear: Tympanic membrane, ear canal and external ear normal. There is no impacted cerumen.      Left Ear: Tympanic membrane, ear canal and external ear normal. There is no impacted cerumen.      Nose: Nose normal. No congestion or rhinorrhea.      Mouth/Throat:      Mouth: Mucous membranes are moist.      Pharynx: Oropharynx is clear. No oropharyngeal exudate or posterior oropharyngeal erythema.   Eyes:      General:         Right eye: No discharge.         Left eye: No discharge.      Extraocular Movements: Extraocular movements intact.      Conjunctiva/sclera: Conjunctivae normal.      Pupils: Pupils are equal, round, and reactive to light.   Neck:      Thyroid: No thyroid mass, thyromegaly or thyroid tenderness.      Vascular: No carotid bruit (unable to assess adequatly r/t not following direction).   Cardiovascular:      Rate and Rhythm: Normal rate and regular rhythm.      Pulses: Normal pulses.      Heart sounds: Normal heart sounds. " No murmur heard.  Pulmonary:      Effort: Pulmonary effort is normal. No respiratory distress.      Breath sounds: Normal breath sounds. No stridor. No wheezing, rhonchi or rales.   Chest:      Chest wall: No tenderness.   Abdominal:      General: Bowel sounds are normal. There is no distension.      Palpations: Abdomen is soft.      Tenderness: There is no abdominal tenderness. There is no guarding.   Musculoskeletal:         General: No tenderness. Normal range of motion.      Cervical back: Normal range of motion and neck supple. No rigidity or tenderness.      Right lower leg: No edema.      Left lower leg: No edema.   Lymphadenopathy:      Cervical: No cervical adenopathy.   Skin:     General: Skin is warm and dry.      Capillary Refill: Capillary refill takes less than 2 seconds.   Neurological:      General: No focal deficit present.      Mental Status: He is alert. Mental status is at baseline.      Sensory: No sensory deficit.      Motor: Weakness present.      Coordination: Coordination abnormal.      Comments: In wheelchair     Psychiatric:         Mood and Affect: Mood normal.                 HEALTH RISK ASSESSMENT    Smoking Status:  Social History     Tobacco Use   Smoking Status Never   Smokeless Tobacco Never     Alcohol Consumption:  Social History     Substance and Sexual Activity   Alcohol Use No     Fall Risk Screen:    STEADI Fall Risk Assessment was completed, and patient is at MODERATE risk for falls. Assessment completed on:11/10/2022    Depression Screening:  PHQ-2/PHQ-9 Depression Screening 11/10/2022   Retired PHQ-9 Total Score -   Retired Total Score -   Little Interest or Pleasure in Doing Things 0-->not at all   Feeling Down, Depressed or Hopeless 0-->not at all   PHQ-9: Brief Depression Severity Measure Score 0       Health Habits and Functional and Cognitive Screening:  Functional & Cognitive Status 11/10/2022   Do you have difficulty preparing food and eating? No   Do you have  difficulty bathing yourself, getting dressed or grooming yourself? Yes   Do you have difficulty using the toilet? Yes   Do you have difficulty moving around from place to place? Yes   Do you have trouble with steps or getting out of a bed or a chair? Yes   Current Diet Well Balanced Diet   Dental Exam Up to date   Eye Exam Up to date   Exercise (times per week) 7 times per week   Current Exercises Include Walking   Do you need help using the phone?  No   Are you deaf or do you have serious difficulty hearing?  Yes   Do you need help with transportation? Yes   Do you need help shopping? Yes   Do you need help preparing meals?  Yes   Do you need help with housework?  Yes   Do you need help with laundry? Yes   Do you need help taking your medications? Yes   Do you need help managing money? Yes   Do you ever drive or ride in a car without wearing a seat belt? No   Have you felt unusual stress, anger or loneliness in the last month? No   Who do you live with? Spouse   If you need help, do you have trouble finding someone available to you? No   Have you been bothered in the last four weeks by sexual problems? No   Do you have difficulty concentrating, remembering or making decisions? No       Age-appropriate Screening Schedule:  Refer to the list below for future screening recommendations based on patient's age, sex and/or medical conditions. Orders for these recommended tests are listed in the plan section. The patient has been provided with a written plan.    Health Maintenance   Topic Date Due   • URINE MICROALBUMIN  Never done   • LIPID PANEL  10/11/2022   • HEMOGLOBIN A1C  10/28/2022   • ZOSTER VACCINE (1 of 2) 11/10/2022 (Originally 12/31/1985)   • DIABETIC EYE EXAM  09/14/2023   • TDAP/TD VACCINES (2 - Td or Tdap) 01/25/2028   • INFLUENZA VACCINE  Completed              Assessment & Plan   CMS Preventative Services Quick Reference  Risk Factors Identified During Encounter  Dementia/Memory   Fall Risk-High or  Moderate  Immunizations Discussed/Encouraged (specific Immunizations; Shingrix and COVID19  Inactivity/Sedentary  Urinary Incontinence  The above risks/problems have been discussed with the patient.  Follow up actions/plans if indicated are seen below in the Assessment/Plan Section.  Pertinent information has been shared with the patient in the After Visit Summary.    Diagnoses and all orders for this visit:    1. Encounter for subsequent annual wellness visit (AWV) in Medicare patient (Primary)    2. Primary hypertension    3. Hyperlipidemia, unspecified hyperlipidemia type    4. Type 2 diabetes mellitus with hyperglycemia, without long-term current use of insulin (HCC)    5. Dementia without behavioral disturbance (HCC)  -     Ambulatory Referral to Social Care Services (Amb Case Mgmt)    6. Ataxia  -     Ambulatory Referral to Social Care Services (Amb Case Mgmt)    7. At high risk for falls  -     Ambulatory Referral to Social Care Services (Amb Case Mgmt)    8. Difficulty feeding self  -     Ambulatory Referral to Social Care Services (Amb Case Mgmt)    9. Need for vaccination  -     COVID-19 Bivalent Booster (Pfizer) 12+yrs        Follow Up:   Return in about 3 months (around 2/10/2023).     An After Visit Summary and PPPS were made available to the patient.             Plan of care reviewed with Mr. Frausto and his wife Tricia Frausto  Clarification performed on referrals that were placed at last appointment, we will additionally refer to social care services.  He will obtain fasting lab work today, we will communicate results as they become available.  He will continue to follow with neurologist for cognitive issues.  His blood pressure today is 130/74, with pulse rate 66, we will continue with current pharmacological management.  We discussed vaccines, would like to get the COVID-19 booster today.  I have asked Tricia to reach out if she does not hear about referrals in 1 week's time.  Follow-up in 3 months,  before this if indicated.

## 2022-11-11 LAB
ALBUMIN SERPL-MCNC: 4.1 G/DL (ref 3.5–5.2)
ALBUMIN/GLOB SERPL: 1.6 G/DL
ALP SERPL-CCNC: 86 U/L (ref 39–117)
ALT SERPL-CCNC: 9 U/L (ref 1–41)
AST SERPL-CCNC: 13 U/L (ref 1–40)
BASOPHILS # BLD AUTO: 0.04 10*3/MM3 (ref 0–0.2)
BASOPHILS NFR BLD AUTO: 0.6 % (ref 0–1.5)
BILIRUB SERPL-MCNC: 0.4 MG/DL (ref 0–1.2)
BUN SERPL-MCNC: 12 MG/DL (ref 8–23)
BUN/CREAT SERPL: 12.4 (ref 7–25)
CALCIUM SERPL-MCNC: 9.6 MG/DL (ref 8.6–10.5)
CHLORIDE SERPL-SCNC: 103 MMOL/L (ref 98–107)
CHOLEST SERPL-MCNC: 151 MG/DL (ref 0–200)
CO2 SERPL-SCNC: 28 MMOL/L (ref 22–29)
CREAT SERPL-MCNC: 0.97 MG/DL (ref 0.76–1.27)
EGFRCR SERPLBLD CKD-EPI 2021: 77 ML/MIN/1.73
EOSINOPHIL # BLD AUTO: 0.36 10*3/MM3 (ref 0–0.4)
EOSINOPHIL NFR BLD AUTO: 5.7 % (ref 0.3–6.2)
ERYTHROCYTE [DISTWIDTH] IN BLOOD BY AUTOMATED COUNT: 13.2 % (ref 12.3–15.4)
GLOBULIN SER CALC-MCNC: 2.6 GM/DL
GLUCOSE SERPL-MCNC: 87 MG/DL (ref 65–99)
HBA1C MFR BLD: 5.4 % (ref 4.8–5.6)
HCT VFR BLD AUTO: 45.1 % (ref 37.5–51)
HDLC SERPL-MCNC: 56 MG/DL (ref 40–60)
HGB BLD-MCNC: 14.5 G/DL (ref 13–17.7)
IMM GRANULOCYTES # BLD AUTO: 0.01 10*3/MM3 (ref 0–0.05)
IMM GRANULOCYTES NFR BLD AUTO: 0.2 % (ref 0–0.5)
LDLC SERPL CALC-MCNC: 82 MG/DL (ref 0–100)
LYMPHOCYTES # BLD AUTO: 1.58 10*3/MM3 (ref 0.7–3.1)
LYMPHOCYTES NFR BLD AUTO: 25 % (ref 19.6–45.3)
MCH RBC QN AUTO: 28.8 PG (ref 26.6–33)
MCHC RBC AUTO-ENTMCNC: 32.2 G/DL (ref 31.5–35.7)
MCV RBC AUTO: 89.7 FL (ref 79–97)
MONOCYTES # BLD AUTO: 0.36 10*3/MM3 (ref 0.1–0.9)
MONOCYTES NFR BLD AUTO: 5.7 % (ref 5–12)
NEUTROPHILS # BLD AUTO: 3.96 10*3/MM3 (ref 1.7–7)
NEUTROPHILS NFR BLD AUTO: 62.8 % (ref 42.7–76)
NRBC BLD AUTO-RTO: 0 /100 WBC (ref 0–0.2)
PLATELET # BLD AUTO: 243 10*3/MM3 (ref 140–450)
POTASSIUM SERPL-SCNC: 4.1 MMOL/L (ref 3.5–5.2)
PROT SERPL-MCNC: 6.7 G/DL (ref 6–8.5)
RBC # BLD AUTO: 5.03 10*6/MM3 (ref 4.14–5.8)
SODIUM SERPL-SCNC: 140 MMOL/L (ref 136–145)
TRIGL SERPL-MCNC: 67 MG/DL (ref 0–150)
TSH SERPL DL<=0.005 MIU/L-ACNC: 1.5 UIU/ML (ref 0.27–4.2)
URATE SERPL-MCNC: 5.7 MG/DL (ref 3.4–7)
VLDLC SERPL CALC-MCNC: 13 MG/DL (ref 5–40)
WBC # BLD AUTO: 6.31 10*3/MM3 (ref 3.4–10.8)

## 2022-11-15 ENCOUNTER — PATIENT OUTREACH (OUTPATIENT)
Dept: CASE MANAGEMENT | Facility: OTHER | Age: 87
End: 2022-11-15

## 2022-11-15 NOTE — OUTREACH NOTE
Social Work Assessment  Questions/Answers    Flowsheet Row Most Recent Value   Referral Source physician   Reason for Consult community resources   Preferred Language English   People in Home spouse   Current Living Arrangements home   Primary Care Provided by spouse/significant other, homecare agency   Source of Income social security, salary/wages   Application for Public Assistance pending public assistance pending number   Usual Activity Tolerance moderate   Current Activity Tolerance moderate   Medications assistive person   Meal Preparation assistive person   Housekeeping assistive person   Laundry assistive person   Shopping assistive person        SDOH updated and reviewed with the patient during this program:  Financial Resource Strain: Low Risk    • Difficulty of Paying Living Expenses: Not very hard      Food Insecurity: No Food Insecurity   • Worried About Running Out of Food in the Last Year: Never true   • Ran Out of Food in the Last Year: Never true      Transportation Needs: No Transportation Needs   • Lack of Transportation (Medical): No   • Lack of Transportation (Non-Medical): No      Housing Stability: Low Risk    • Unable to Pay for Housing in the Last Year: No   • Number of Places Lived in the Last Year: 1   • Unstable Housing in the Last Year: No     Care Coordination    MSW spoke with patient's spouse regarding resources needed. Patient's spouse states that she requested a HH referral through [facility]. MSW explained her role with  and referral. Patient's spouse interested in HH services through [facility]. MSW sent in-basket message to PCP regarding this referral. MSW did provided caregiver support resources through [facility]. MSW available if needed in the future.    CHAVA Haley   Ambulatory Case Management    11/15/2022, 14:25 EST   Home

## 2022-11-17 ENCOUNTER — OFFICE VISIT (OUTPATIENT)
Dept: NEUROLOGY | Age: 87
End: 2022-11-17
Payer: MEDICARE

## 2022-11-17 VITALS
BODY MASS INDEX: 26.96 KG/M2 | SYSTOLIC BLOOD PRESSURE: 145 MMHG | OXYGEN SATURATION: 100 % | WEIGHT: 182 LBS | HEART RATE: 56 BPM | HEIGHT: 69 IN | DIASTOLIC BLOOD PRESSURE: 82 MMHG

## 2022-11-17 DIAGNOSIS — R27.0 ATAXIA: Primary | ICD-10-CM

## 2022-11-17 DIAGNOSIS — R41.3 MEMORY LOSS: ICD-10-CM

## 2022-11-17 DIAGNOSIS — G20 PARKINSONISM, UNSPECIFIED PARKINSONISM TYPE (HCC): ICD-10-CM

## 2022-11-17 PROCEDURE — 1123F ACP DISCUSS/DSCN MKR DOCD: CPT | Performed by: PSYCHIATRY & NEUROLOGY

## 2022-11-17 PROCEDURE — 99214 OFFICE O/P EST MOD 30 MIN: CPT | Performed by: PSYCHIATRY & NEUROLOGY

## 2022-11-17 NOTE — PROGRESS NOTES
Chief Complaint   Patient presents with    Follow-up     Alzheimer's disease       Carroll Wade is a 82y.o. year old male who is seen for evaluation of possible Parkinson's disease. Patient  seen here in the office over 3 years ago, 9/19, for probable Alzheimer's disease. B12, TSH and MRI of the head were unremarkable. He was on Namzeric at that time. No longer on either 1 of those drugs. He had been getting physical therapy recently and there was some concern he could have Parkinson's disease. He has had a couple of CTs in the past year suggesting NPH. This was not present on his previous MRI. He had a fluoroscopic lumbar puncture which was a bloody tap and only 3 cc were removed. There were almost 6000 red blood cells in this. White count reported to be 356 but this is nearly impossible to quantitate given the bloody tap. Likewise, protein elevated at 356 but difficult to assess. Culture negative. He was also started on Sinemet 25/100 twice daily. He is walking better now at least on some days.   Active Ambulatory Problems     Diagnosis Date Noted    Anal bleeding 08/23/2013    Rectal itching 08/23/2013    History of colon polyps 08/23/2013    Trigger finger, left middle finger 12/21/2016     Resolved Ambulatory Problems     Diagnosis Date Noted    No Resolved Ambulatory Problems     Past Medical History:   Diagnosis Date    Alzheimer disease ([address]     Depression     Hypertension     Memory difficulty     Osteoarthritis     Trigger finger     Unstable balance     Urinary incontinence        Past Surgical History:   Procedure Laterality Date    CERVICAL DISC SURGERY      COLONOSCOPY  8-    BODNARCHUK    FINGER TRIGGER RELEASE Left 12/21/2016    MIDDLE FINGER TRIGGER RELEASE performed by Latricia Harris MD at [address] [place] [address]    RECTAL SURGERY      FISSURE REPAIR    TONSILLECTOMY AND ADENOIDECTOMY         Family History   Problem Relation Age of Onset    Colon Cancer Neg Hx     Colon Polyps Neg Hx        Allergies   Allergen Reactions    Ramipril     Ace Inhibitors Rash    Midazolam Rash     Patient already has memory issues and does not want versed. Patient already has memory issues and does not want versed.     Penicillins Rash       Social History     Socioeconomic History    Marital status:      Spouse name: Marni    Number of children: Not on file    Years of education: Not on file    Highest education level: Not on file   Occupational History    Not on file   Tobacco Use    Smoking status: Never    Smokeless tobacco: Never   Vaping Use    Vaping Use: Never used   Substance and Sexual Activity    Alcohol use: No    Drug use: No    Sexual activity: Not on file   Other Topics Concern    Not on file   Social History Narrative    Not on file     Social Determinants of Health     Financial Resource Strain: Not on file   Food Insecurity: Not on file   Transportation Needs: Not on file   Physical Activity: Not on file   Stress: Not on file   Social Connections: Not on file   Intimate Partner Violence: Not on file   Housing Stability: Not on file       Review of SystemsConstitutional: []Fever []Sweat []Chills [] Recent Injury [x] Denies all unless marked  HEENT:[]Headache  [] Head Injury/Hearing Loss  [] Sore Throat  [] Ear Ache/Dizziness  [x] Denies all unless marked  Spine:  [] Neck pain  [] Back pain  [] Sciaticia  [x] Denies all unless marked  Cardiovascular:[]Heart Disease []Chest Pain [] Palpitations  [x] Denies all unless marked  Pulmonary: []Shortness of Breath []Cough   [x] Denies all unless marke  Gastrointestinal: []Nausea  []Vomiting  []Abdominal Pain  []Constipation  []Diarrhea  []Dark Bloody Stools  [x] Denies all unless marked  Psychiatric/Behavioral:[] Depression [] Anxiety [x] Denies all unless marked  Genitourinary:   [] Frequency  [] Urgency  [] Incontinence [] Pain with Urination  [x] Denies all unless marked  Extremities: []Pain  []Swelling  [x] Denies all unless marked  Musculoskeletal: [] Muscle Pain  [] Joint Pain  [] Arthritis [] Muscle Cramps [] Muscle Twitches  [x] Denies all unless marked  Sleep: [] Insomnia [] Snoring [] Restless Legs [] Sleep Apnea  [] Daytime Sleepiness  [x] Denies all unless marked  Skin:[] Rash [] Skin Discoloration [x] Denies all unless marked   Neurological: [x]Visual Disturbance/Memory Loss [] Loss of Balance [] Slurred Speech/Weakness [] Seizures  [] Vertigo/Dizziness [x] Denies all unless marked               Current Outpatient Medications   Medication Sig Dispense Refill    carbidopa-levodopa (SINEMET)  MG per tablet Take 1 1/2 pills in am and the afternoon 90 tablet 5    carbidopa-levodopa (SINEMET)  MG per tablet Take 1 each am and afternoon 90 tablet 3    amLODIPine (NORVASC) 5 MG tablet Take 5 mg by mouth daily        No current facility-administered medications for this visit. Outpatient Medications Marked as Taking for the 11/17/22 encounter (Office Visit) with Anton Espinoza MD   Medication Sig Dispense Refill    carbidopa-levodopa (SINEMET)  MG per tablet Take 1 1/2 pills in am and the afternoon 90 tablet 5    carbidopa-levodopa (SINEMET)  MG per tablet Take 1 each am and afternoon 90 tablet 3    amLODIPine (NORVASC) 5 MG tablet Take 5 mg by mouth daily          BP (!) 145/82 Comment: Pt refused  Pulse 56   Ht 5' 9\" (1.753 m)   Wt 182 lb (82.6 kg)   SpO2 100%   BMI 26.88 kg/m²       Constitutional - well developed, well nourished. Eyes - conjunctiva normal.  Pupils react to light  Ear, nose, throat -hearing intact to finger rub No scars, masses, or lesions over external nose or ears, no atrophy of tongue  Neck-symmetric, no masses noted, no jugular vein distension. No bruits noted.   Respiration- chest wall appears symmetric, good expansion,   normal effort without use of accessory muscles  Cardiovascular- RRR  Musculoskeletal - no significant wasting of muscles noted, no bony deformities, gait no gross ataxia  Extremities-no clubbing, cyanosis or edema  Skin - warm, dry, and intact. No rash, erythema, or pallor. Psychiatric - mood, affect, and behavior appear normal.      Neurological exam  Awake, alert, fluent but with word finding difficulties. Frontal release signs are present. Cranial Nerve Exam   CN II- Visual fields grossly unremarkable. VA adequate  CN III, IV,VI- PERRLA, EOMI, No nystagmus, conjugate eye movements, no ptosis  CN VII-no facial asymmetry. Stare and masked face noted. CN VIII-Hearing intact   CN IX and X- Palate elevates in midline  CN XI-good shoulder shrug  CN XII-Tongue midline with no fasciculations or fibrillations    Motor Exam  V/V throughout upper and lower extremities bilaterally, has cogwheeling    Sensory Exam  Sensation intact to light touch , PP  upper and lower extremities bilaterally; normal vibration sense in LE's    Reflexes   2+ biceps bilaterally  2+ brachioradialis  2+ triceps  2+patella    Tremors- no tremors in hands or head noted    Gait unsteady with shuffling    Coordination  Finger to nose and AMERICA-unremarkable    No results found for: [id number]  Lab Results   Component Value Date    WBC 11.64 (H) 05/20/2015    HGB 12.4 (L) 05/20/2015    HCT 36.5 (L) 05/20/2015    MCV 88.4 05/20/2015     11/03/2022     Lab Results   Component Value Date     12/15/2016    K 4.3 12/15/2016     12/15/2016    CO2 27 12/15/2016    BUN 18 12/15/2016    CREATININE 1.0 12/15/2016    GLUCOSE 95 12/15/2016    CALCIUM 9.8 12/15/2016    LABGLOM >60 12/15/2016           Assessment    ICD-10-CM    1. Ataxia  R27.0       2. Parkinsonism, unspecified Parkinsonism type ([facility] [address]  G20       3. Memory loss  R41.3             Patient does have parkinsonism with a rather severe dementia. There was no concern for NPH when his cognitive difficulties began now having large ventricles.   Unfortunately radiology could not get a significant amount of CSF out. He seems to be responding somewhat to Sinemet. He will try increasing this to 1-1/2 pills twice a day giving his first dose about 30 minutes before he gets out of bed. Home physical therapy to begin soon. Dementia remains rather severe. Plan          Orders Placed This Encounter   Medications    carbidopa-levodopa (SINEMET)  MG per tablet     Sig: Take 1 1/2 pills in am and the afternoon     Dispense:  90 tablet     Refill:  5         Pt warned of potential side effects of medication changes/new medicines. They are to call for new or ongoing difficulties. Return in about 3 months (around 2/17/2023).     (Please note that portions of this note were completed with a voice recognition program. Efforts were made to edit the dictations but occasionally words are mis-transcribed.)

## 2022-11-23 ENCOUNTER — TELEPHONE (OUTPATIENT)
Dept: INTERNAL MEDICINE | Facility: CLINIC | Age: 87
End: 2022-11-23

## 2022-11-23 NOTE — TELEPHONE ENCOUNTER
Called wife - she says she mentioned this to you at his recent visit, but seems to be getting worse.  I explained that you were out of the office until Monday and I offered her an appt with one of the other providers here today to address, but she deferred.  Please advise.

## 2022-11-23 NOTE — TELEPHONE ENCOUNTER
Caller: Tricia Frausto -  [place]    Relationship: WIFE     Best call back number:    [phone]          What is your medical concern?  THE PATIENT'S WIFE STATES THAT THE PATIENT IS SLEEPING FOR FIFTEEN HOURS STRAIGHT AND THEN CONTINUING TO HAVE DIFFICULTY  STAYING AWAKE THROUGHOUT THE DAY. THE PATIENT'S WIFE STATES THAT SHE WOULD LIKE A CALL BACK.    How long has this issue been going on? THE PATIENT'S WIFE STATES THE PATIENT HAS HAD THE SYMPTOMS FOR AWHILE    Is your provider already aware of this issue? YES    Have you been treated for this issue? NO

## 2022-11-25 LAB
APPEARANCE UR: CLEAR
BACTERIA #/AREA URNS HPF: ABNORMAL /HPF
BILIRUB UR QL STRIP: NEGATIVE
CASTS URNS MICRO: ABNORMAL
COLOR UR: ABNORMAL
CRYSTALS URNS MICRO: ABNORMAL
EPI CELLS #/AREA URNS HPF: ABNORMAL /HPF
GLUCOSE UR QL STRIP: NEGATIVE
HGB UR QL STRIP: NEGATIVE
KETONES UR QL STRIP: ABNORMAL
LEUKOCYTE ESTERASE UR QL STRIP: NEGATIVE
MICROALBUMIN UR-MCNC: NORMAL UG/ML
MUCOUS THREADS URNS QL MICRO: ABNORMAL /HPF
NITRITE UR QL STRIP: NEGATIVE
PH UR STRIP: 6 [PH] (ref 5–8)
PROT UR QL STRIP: ABNORMAL
RBC #/AREA URNS HPF: ABNORMAL /HPF
REQUEST PROBLEM: NORMAL
SP GR UR STRIP: ABNORMAL (ref 1–1.03)
UROBILINOGEN UR STRIP-MCNC: ABNORMAL MG/DL
WBC #/AREA URNS HPF: ABNORMAL /HPF

## 2022-12-02 DIAGNOSIS — N39.41 URGE INCONTINENCE: Primary | ICD-10-CM

## 2022-12-02 DIAGNOSIS — R30.0 DYSURIA: ICD-10-CM

## 2022-12-05 ENCOUNTER — PROCEDURE VISIT (OUTPATIENT)
Dept: UROLOGY | Facility: CLINIC | Age: 87
End: 2022-12-05

## 2022-12-05 DIAGNOSIS — R30.0 DYSURIA: Primary | ICD-10-CM

## 2022-12-05 DIAGNOSIS — R30.0 DYSURIA: ICD-10-CM

## 2022-12-05 LAB
BILIRUB BLD-MCNC: NEGATIVE MG/DL
CLARITY, POC: ABNORMAL
COLOR UR: ABNORMAL
GLUCOSE UR STRIP-MCNC: NEGATIVE MG/DL
KETONES UR QL: NEGATIVE
LEUKOCYTE EST, POC: NEGATIVE
NITRITE UR-MCNC: POSITIVE MG/ML
PH UR: 7 [PH] (ref 5–8)
PROT UR STRIP-MCNC: ABNORMAL MG/DL
RBC # UR STRIP: ABNORMAL /UL
SP GR UR: 1.02 (ref 1–1.03)
UROBILINOGEN UR QL: NORMAL

## 2022-12-05 PROCEDURE — 87086 URINE CULTURE/COLONY COUNT: CPT | Performed by: UROLOGY

## 2022-12-05 PROCEDURE — 99024 POSTOP FOLLOW-UP VISIT: CPT | Performed by: PHYSICIAN ASSISTANT

## 2022-12-05 NOTE — PROGRESS NOTES
Patient's urine was brought into the office for testing.  Urine was positive for Nitrates. I will send specimen to be cultured today. I will notify patient's wife with results when they are ready.      Mallika Olivares  Urology Medical Assistant    Reviewed and Agreed with Medical Assistant's documentation as above.

## 2022-12-06 LAB — BACTERIA SPEC AEROBE CULT: ABNORMAL

## 2022-12-27 ENCOUNTER — APPOINTMENT (OUTPATIENT)
Dept: GENERAL RADIOLOGY | Facility: HOSPITAL | Age: 87
End: 2022-12-27

## 2022-12-27 ENCOUNTER — APPOINTMENT (OUTPATIENT)
Dept: CT IMAGING | Facility: HOSPITAL | Age: 87
End: 2022-12-27

## 2022-12-27 ENCOUNTER — HOSPITAL ENCOUNTER (EMERGENCY)
Facility: HOSPITAL | Age: 87
Discharge: HOME | End: 2022-12-27
Attending: STUDENT IN AN ORGANIZED HEALTH CARE EDUCATION/TRAINING PROGRAM | Admitting: STUDENT IN AN ORGANIZED HEALTH CARE EDUCATION/TRAINING PROGRAM

## 2022-12-27 VITALS
BODY MASS INDEX: 25.77 KG/M2 | DIASTOLIC BLOOD PRESSURE: 82 MMHG | OXYGEN SATURATION: 98 % | HEART RATE: 66 BPM | WEIGHT: 180 LBS | TEMPERATURE: 97.9 F | RESPIRATION RATE: 20 BRPM | SYSTOLIC BLOOD PRESSURE: 144 MMHG | HEIGHT: 70 IN

## 2022-12-27 DIAGNOSIS — H55.89 ROLLING MOVEMENT OF EYE: ICD-10-CM

## 2022-12-27 DIAGNOSIS — Z82.0 FAMILY HISTORY OF ALZHEIMER'S DISEASE: ICD-10-CM

## 2022-12-27 DIAGNOSIS — R11.10 VOMITING, UNSPECIFIED VOMITING TYPE, UNSPECIFIED WHETHER NAUSEA PRESENT: Primary | ICD-10-CM

## 2022-12-27 LAB
ALBUMIN SERPL-MCNC: 4 G/DL (ref 3.5–5.2)
ALBUMIN/GLOB SERPL: 1.2 G/DL
ALP SERPL-CCNC: 86 U/L (ref 39–117)
ALT SERPL W P-5'-P-CCNC: <5 U/L (ref 1–41)
ANION GAP SERPL CALCULATED.3IONS-SCNC: 8 MMOL/L (ref 5–15)
AST SERPL-CCNC: 20 U/L (ref 1–40)
BASOPHILS # BLD AUTO: 0.04 10*3/MM3 (ref 0–0.2)
BASOPHILS NFR BLD AUTO: 0.5 % (ref 0–1.5)
BILIRUB SERPL-MCNC: 0.6 MG/DL (ref 0–1.2)
BUN SERPL-MCNC: 12 MG/DL (ref 8–23)
BUN/CREAT SERPL: 13.5 (ref 7–25)
CALCIUM SPEC-SCNC: 9.4 MG/DL (ref 8.6–10.5)
CHLORIDE SERPL-SCNC: 102 MMOL/L (ref 98–107)
CO2 SERPL-SCNC: 29 MMOL/L (ref 22–29)
CREAT SERPL-MCNC: 0.89 MG/DL (ref 0.76–1.27)
DEPRECATED RDW RBC AUTO: 44.5 FL (ref 37–54)
EGFRCR SERPLBLD CKD-EPI 2021: 84.5 ML/MIN/1.73
EOSINOPHIL # BLD AUTO: 0.18 10*3/MM3 (ref 0–0.4)
EOSINOPHIL NFR BLD AUTO: 2.2 % (ref 0.3–6.2)
ERYTHROCYTE [DISTWIDTH] IN BLOOD BY AUTOMATED COUNT: 13.4 % (ref 12.3–15.4)
FLUAV RNA RESP QL NAA+PROBE: NOT DETECTED
FLUBV RNA RESP QL NAA+PROBE: NOT DETECTED
GLOBULIN UR ELPH-MCNC: 3.3 GM/DL
GLUCOSE SERPL-MCNC: 113 MG/DL (ref 65–99)
HCT VFR BLD AUTO: 39.9 % (ref 37.5–51)
HGB BLD-MCNC: 12.7 G/DL (ref 13–17.7)
HOLD SPECIMEN: NORMAL
HOLD SPECIMEN: NORMAL
IMM GRANULOCYTES # BLD AUTO: 0.05 10*3/MM3 (ref 0–0.05)
IMM GRANULOCYTES NFR BLD AUTO: 0.6 % (ref 0–0.5)
LYMPHOCYTES # BLD AUTO: 0.8 10*3/MM3 (ref 0.7–3.1)
LYMPHOCYTES NFR BLD AUTO: 9.9 % (ref 19.6–45.3)
MAGNESIUM SERPL-MCNC: 2 MG/DL (ref 1.6–2.4)
MCH RBC QN AUTO: 28.7 PG (ref 26.6–33)
MCHC RBC AUTO-ENTMCNC: 31.8 G/DL (ref 31.5–35.7)
MCV RBC AUTO: 90.3 FL (ref 79–97)
MONOCYTES # BLD AUTO: 0.33 10*3/MM3 (ref 0.1–0.9)
MONOCYTES NFR BLD AUTO: 4.1 % (ref 5–12)
NEUTROPHILS NFR BLD AUTO: 6.65 10*3/MM3 (ref 1.7–7)
NEUTROPHILS NFR BLD AUTO: 82.7 % (ref 42.7–76)
NRBC BLD AUTO-RTO: 0 /100 WBC (ref 0–0.2)
NT-PROBNP SERPL-MCNC: 94.7 PG/ML (ref 0–1800)
PLATELET # BLD AUTO: 254 10*3/MM3 (ref 140–450)
PMV BLD AUTO: 8.8 FL (ref 6–12)
POTASSIUM SERPL-SCNC: 4.4 MMOL/L (ref 3.5–5.2)
PROT SERPL-MCNC: 7.3 G/DL (ref 6–8.5)
RBC # BLD AUTO: 4.42 10*6/MM3 (ref 4.14–5.8)
SARS-COV-2 RNA RESP QL NAA+PROBE: NOT DETECTED
SODIUM SERPL-SCNC: 139 MMOL/L (ref 136–145)
TROPONIN T SERPL-MCNC: <0.01 NG/ML (ref 0–0.03)
WBC NRBC COR # BLD: 8.05 10*3/MM3 (ref 3.4–10.8)
WHOLE BLOOD HOLD COAG: NORMAL
WHOLE BLOOD HOLD SPECIMEN: NORMAL

## 2022-12-27 PROCEDURE — 70450 CT HEAD/BRAIN W/O DYE: CPT

## 2022-12-27 PROCEDURE — 85025 COMPLETE CBC W/AUTO DIFF WBC: CPT | Performed by: STUDENT IN AN ORGANIZED HEALTH CARE EDUCATION/TRAINING PROGRAM

## 2022-12-27 PROCEDURE — 83735 ASSAY OF MAGNESIUM: CPT | Performed by: STUDENT IN AN ORGANIZED HEALTH CARE EDUCATION/TRAINING PROGRAM

## 2022-12-27 PROCEDURE — 93005 ELECTROCARDIOGRAM TRACING: CPT | Performed by: STUDENT IN AN ORGANIZED HEALTH CARE EDUCATION/TRAINING PROGRAM

## 2022-12-27 PROCEDURE — 87636 SARSCOV2 & INF A&B AMP PRB: CPT | Performed by: STUDENT IN AN ORGANIZED HEALTH CARE EDUCATION/TRAINING PROGRAM

## 2022-12-27 PROCEDURE — 84484 ASSAY OF TROPONIN QUANT: CPT | Performed by: STUDENT IN AN ORGANIZED HEALTH CARE EDUCATION/TRAINING PROGRAM

## 2022-12-27 PROCEDURE — 71045 X-RAY EXAM CHEST 1 VIEW: CPT

## 2022-12-27 PROCEDURE — 99284 EMERGENCY DEPT VISIT MOD MDM: CPT

## 2022-12-27 PROCEDURE — 80053 COMPREHEN METABOLIC PANEL: CPT | Performed by: STUDENT IN AN ORGANIZED HEALTH CARE EDUCATION/TRAINING PROGRAM

## 2022-12-27 PROCEDURE — 93010 ELECTROCARDIOGRAM REPORT: CPT | Performed by: EMERGENCY MEDICINE

## 2022-12-27 PROCEDURE — 83880 ASSAY OF NATRIURETIC PEPTIDE: CPT | Performed by: STUDENT IN AN ORGANIZED HEALTH CARE EDUCATION/TRAINING PROGRAM

## 2022-12-27 RX ORDER — SODIUM CHLORIDE 0.9 % (FLUSH) 0.9 %
10 SYRINGE (ML) INJECTION AS NEEDED
Status: DISCONTINUED | OUTPATIENT
Start: 2022-12-27 | End: 2022-12-27 | Stop reason: HOSPADM

## 2022-12-27 NOTE — ED PROVIDER NOTES
EMERGENCY DEPARTMENT HISTORY AND PHYSICAL EXAM    Patient Name: Claudio Frausto    Chief Complaint   Patient presents with   • Syncope     PT sent here via EMS. According to wife PT was eating and had episode where eyes rolled to back of head and was not responding for a couple seconds. PT then began to vomit. He is not c/o of any pain @ this time.        History of Presenting Illness:  Claudio Frausto is a 86 y.o. male with history of Alzheimer's disease who presents emergency department brought in by his wife due to a brief episode of abnormal eye movements and an episode of vomiting.    Wife provides complete history.  Patient reportedly was eating at the table with her when he had a brief episode where his eyes seemed to roll upwards for about 3 seconds he did not lose consciousness but then had a episode of vomiting shortly after which is why she are concerned and presents the emergency department.  He has been acting his normal self prior and following.  Has not been complaining of anything.  Has been eating and drinking    Unable to obtain additional history due to patient's baseline altered mental status.      Past Medical History:   Unable to obtain due to patient's altered mental status. Per prior chart:  Past Medical History:   Diagnosis Date   • Alzheimer disease (HCC)    • BCC (basal cell carcinoma)     right ear   • Cancer (HCC)     skin   • Hypertension    • Short-term memory loss    • Trigger finger    • Trigger finger, left middle finger 12/21/2016   • Wears dentures    • Wears glasses        Past Surgical History:  Unable to obtain due to patient's altered mental status. Per prior chart:  Past Surgical History:   Procedure Laterality Date   • LAPAROSCOPIC CHOLECYSTECTOMY     • TRIGGER FINGER RELEASE Left     and on right hand   • TRIGGER FINGER RELEASE Left 5/12/2017    Procedure: LEFT RING FINGER TRIGGER RELEASE;  Surgeon: Tyler Godwin MD;  Location: [facility];  Service:        Family  "History:   Unable to obtain due to patient's altered mental status. Per prior chart:  History reviewed. No pertinent family history.    Social History:   Unable to obtain due to patient's altered mental status. Per prior chart:  Social History     Socioeconomic History   • Marital status:    Tobacco Use   • Smoking status: Never   • Smokeless tobacco: Never   Vaping Use   • Vaping Use: Never used   Substance and Sexual Activity   • Alcohol use: No   • Drug use: No   • Sexual activity: Not Currently       Allergies:   Unable to obtain due to patient's altered mental status. Per prior chart:  Allergies   Allergen Reactions   • Ace Inhibitors Rash   • Midazolam Rash     Patient already has memory issues and does not want versed.   • Penicillins Rash       Medications:   Unable to obtain due to patient's altered mental status. Per prior chart:  No current facility-administered medications for this encounter.    Current Outpatient Medications:   •  amLODIPine (NORVASC) 5 MG tablet, Take 1 tablet by mouth Daily., Disp: 90 tablet, Rfl: 3  •  carbidopa-levodopa (SINEMET)  MG per tablet, Take 1 each am and afternoon, Disp: , Rfl:     Review of Systems:  Unable to obtain due to patient's altered mental status.    Physical Exam:  VS: /82   Pulse 66   Temp 97.9 °F (36.6 °C) (Oral)   Resp 20   Ht 177.8 cm (70\")   Wt 81.6 kg (180 lb)   SpO2 98%   BMI 25.83 kg/m²   GENERAL: Well-appearing elderly gentleman sitting up stretcher no acute distress; well-nourished, well-developed, awake, alert, no acute distress, nontoxic appearing, comfortable  EYES: PERRL, sclera anicteric, extraocular movements grossly intact, symmetric lids  EARS, NOSE, MOUTH, THROAT: atraumatic external nose and ears, moist mucous membranes  NECK: Symmetric, trachea midline, no thyromegaly, no adenopathy, no meningismus  RESPIRATORY: Unlabored respiratory effort, clear to auscultation bilaterally, good air movement  CARDIOVASCULAR: No " murmurs or gallops, peripheral pulses 2+ and equal in all extremities  GI: Soft, nontender, nondistended, bowel sounds present, no hepatosplenomegaly  LYMPHATIC: no lymphadenopathy  MUSCULOSKELETAL/EXTREMITIES: Extremities without obvious deformity, no cyanosis or clubbing  SKIN: warm and dry with no obvious rashes  NEUROLOGIC: moving all 4 extremities symmetrically, CN II-XII grossly intact; patient relatively nonverbal will answer limited questions with one-word answers  PSYCHIATRIC: alert, pleasant and cooperative. Appropriate mood and affect.      Labs:   Labs Reviewed   COMPREHENSIVE METABOLIC PANEL - Abnormal; Notable for the following components:       Result Value    Glucose 113 (*)     All other components within normal limits    Narrative:     GFR Normal >60  Chronic Kidney Disease <60  Kidney Failure <15    The GFR formula is only valid for adults with stable renal function between ages 18 and 70.   CBC WITH AUTO DIFFERENTIAL - Abnormal; Notable for the following components:    Hemoglobin 12.7 (*)     Neutrophil % 82.7 (*)     Lymphocyte % 9.9 (*)     Monocyte % 4.1 (*)     Immature Grans % 0.6 (*)     All other components within normal limits   COVID-19 AND FLU A/B, NP SWAB IN TRANSPORT MEDIA 8-12 HR TAT - Normal    Narrative:     Fact sheet for providers: https://www.fda.gov/media/912107/download    Fact sheet for patients: https://www.fda.gov/media/742427/download    Test performed by PCR.   MAGNESIUM - Normal   TROPONIN (IN-HOUSE) - Normal    Narrative:     Troponin T Reference Range:  <= 0.03 ng/mL-   Negative for AMI  >0.03 ng/mL-     Abnormal for myocardial necrosis.  Clinicians would have to utilize clinical acumen, EKG, Troponin and serial changes to determine if it is an Acute Myocardial Infarction or myocardial injury due to an underlying chronic condition.       Results may be falsely decreased if patient taking Biotin.     BNP (IN-HOUSE) - Normal    Narrative:     Among patients with dyspnea,  NT-proBNP is highly sensitive for the detection of acute congestive heart failure. In addition NT-proBNP of <300 pg/ml effectively rules out acute congestive heart failure with 99% negative predictive value.     RAINBOW DRAW    Narrative:     The following orders were created for panel order [place] Draw.  Procedure                               Abnormality         Status                     ---------                               -----------         ------                     Green Top (Gel)[[id number]]                                  Final result               Lavender Top[[id number]]                                     Final result               Red Top[[id number]]                                          Final result               Light Blue Top[[id number]]                                   Final result                 Please view results for these tests on the individual orders.   CBC AND DIFFERENTIAL    Narrative:     The following orders were created for panel order CBC & Differential.  Procedure                               Abnormality         Status                     ---------                               -----------         ------                     CBC Auto Differential[[id number]]        Abnormal            Final result                 Please view results for these tests on the individual orders.   GREEN TOP   LAVENDER TOP   RED TOP   LIGHT BLUE TOP         Radiology:   CT Head Without Contrast   Final Result   Impression:     1. No acute intracranial process.   2. Underlying advanced chronic small vessel ischemic change.   3. Lateral and third ventriculomegaly which is stable, suspicious for   underlying communicating hydrocephalus such as NPH.   This report was finalized on 12/27/2022 17:04 by Dr Jan Davila, .      XR Chest 1 View   Final Result   . Bibasilar atelectasis.   This report was finalized on 12/27/2022 15:38 by Dr. Juan Lira MD.            Medical Decision Making:  Claudio Gonzales  Jett is a 86 y.o. male presents emerged department brought in by his wife after a brief episode of his eyes rolling back with vomiting.    I have reviewed and interpreted the EKG and it shows: sinus bradycardia with a rate of 58. Normal axis and intervals. No signs of acute ischemia such as ST elevation, ST depression, or T wave inversion. No signs of hyperkalemia, WPW, PE, pericarditis, LV aneurysm, Brugada, heart block, atrial fibrillation or A flutter.    Labs were ordered and reviewed.  Normal white blood cell count.  Only slightly due to months 0.7.  Negative troponin BNP magnesium.  CMP normal.  COVID and influenza testing normal.    Imaging was ordered and reviewed.  CT of the head with no intracranial normality.  Chest x-ray with no abnormality.    Nursing notes were reviewed.    Unclear exact etiology the patient symptoms.  The episode was extremely brief in nature does not appear consistent with syncopal episode.  Also not consistent with a seizure.  Patient at his neurologic baseline no evidence of any pneumonia or other abnormality that might explain his symptoms.  No infectious symptoms.  Patient with no abdominal pain or tenderness that would concern for bowel obstruction or any intra-abdominal pathologies of nausea and vomiting.  Would consider gastrointestinal infection such as gastroenteritis.    Patient tolerating p.o. in the emergency department.  Exam and still with no abdominal tenderness to well-appearing    Patient was discharged home with plan to follow-up with his primary care provider within 2 days for further management return to the emergency department immediately if symptoms worsen.      ED Diagnosis:  Rolling movement of eye; Family history of Alzheimer's disease; Vomiting, unspecified vomiting type, unspecified whether nausea present      Disposition: to home    Follow up plan: PCP follow up within 2 days, return to ED immediately if symptoms worsen        Signed:  Jose Miller  MD Paty  Emergency Medicine Physician    Please note that portions of this note were completed with a voice recognition program.        Jose Newman MD  12/27/22 [id number]

## 2022-12-27 NOTE — DISCHARGE INSTRUCTIONS
Today her  was seen for his symptoms his work-up was reassuring.  I want him to follow-up closely with his primary care provider within 2 days for further management.  If his symptoms worsen prior please return to the emergency department immediately.

## 2022-12-28 ENCOUNTER — NURSE TRIAGE (OUTPATIENT)
Dept: CALL CENTER | Facility: HOSPITAL | Age: 87
End: 2022-12-28

## 2022-12-28 LAB
QT INTERVAL: 414 MS
QTC INTERVAL: 406 MS

## 2022-12-28 NOTE — TELEPHONE ENCOUNTER
"Caller requesting refill of pt's Amlodipine, They are going out of town on Friday morning. Advised caller to call office in am for refill request. Alternatively, call Pharmacy to contact office for refill request. Caller agrees to follow care advice.     Reason for Disposition  • [1] Prescription refill request for NON-ESSENTIAL medicine (i.e., no harm to patient if med not taken) AND [2] triager unable to refill per department policy    Additional Information  • Negative: New-onset or worsening symptoms, see that guideline  (e.g., diarrhea, runny nose, sore throat)  • Negative: Medicine question not related to refill or renewal  • Negative: Caller requesting information unrelated to medicine  • Negative: [1] Prescription refill request for ESSENTIAL medicine (i.e., likelihood of harm to patient if not taken) AND [2] triager unable to refill per department policy  • Negative: [1] Prescription not at pharmacy AND [2] was prescribed by PCP recently (Exception: triager has access to EMR and prescription is recorded there. Go to Home Care and confirm for pharmacy.)  • Negative: [1] Pharmacy calling with prescription questions AND [2] triager unable to answer question  • Negative: Prescription request for new medicine (not a refill)  • Negative: Caller requesting a CONTROLLED substance prescription refill (e.g., narcotics, ADHD medicines)    Answer Assessment - Initial Assessment Questions  1. DRUG NAME: \"What medicine do you need to have refilled?\"      Amlodipine    2. REFILLS REMAINING: \"How many refills are remaining?\" (Note: The label on the medicine or pill bottle will show how many refills are remaining. If there are no refills remaining, then a renewal may be needed.)      none  3. EXPIRATION DATE: \"What is the expiration date?\" (Note: The label states when the prescription will , and thus can no longer be refilled.)      na  4. PRESCRIBING HCP: \"Who prescribed it?\" Reason: If prescribed by specialist, call " "should be referred to that group.      Dr. Franco  5. SYMPTOMS: \"Do you have any symptoms?\"      na  6. PREGNANCY: \"Is there any chance that you are pregnant?\" \"When was your last menstrual period?\"      na    Protocols used: MEDICATION REFILL AND RENEWAL CALL-ADULT-      "

## 2022-12-29 RX ORDER — AMLODIPINE BESYLATE 5 MG/1
5 TABLET ORAL DAILY
Qty: 90 TABLET | Refills: 3 | Status: SHIPPED | OUTPATIENT
Start: 2022-12-29

## 2022-12-29 RX ORDER — AMLODIPINE BESYLATE 5 MG/1
5 TABLET ORAL DAILY
Qty: 90 TABLET | Refills: 3 | Status: CANCELLED | OUTPATIENT
Start: 2022-12-29

## 2022-12-29 NOTE — TELEPHONE ENCOUNTER
Caller: Humphrey Fraustoothy    Relationship: Emergency Contact    Best call back number: [phone]    Requested Prescriptions:   Requested Prescriptions     Pending Prescriptions Disp Refills   • amLODIPine (NORVASC) 5 MG tablet 90 tablet 3     Sig: Take 1 tablet by mouth Daily.        Pharmacy where request should be sent: [facility] [address] [facility] [phone] PH - [phone] FX     Does the patient have less than a 3 day supply:  [x] Yes  [] No    Would you like a call back once the refill request has been completed: [x] Yes [] No    If the office needs to give you a call back, can they leave a voicemail: [x] Yes [] No    Joseph Burnham Rep   12/29/22 08:05 CST

## 2023-01-05 ENCOUNTER — OFFICE VISIT (OUTPATIENT)
Dept: INTERNAL MEDICINE | Facility: CLINIC | Age: 88
End: 2023-01-05
Payer: MEDICARE

## 2023-01-05 VITALS
DIASTOLIC BLOOD PRESSURE: 60 MMHG | HEIGHT: 70 IN | WEIGHT: 180 LBS | SYSTOLIC BLOOD PRESSURE: 118 MMHG | HEART RATE: 60 BPM | OXYGEN SATURATION: 97 % | BODY MASS INDEX: 25.77 KG/M2 | TEMPERATURE: 97.1 F

## 2023-01-05 DIAGNOSIS — R55 VASOVAGAL SYNCOPE: ICD-10-CM

## 2023-01-05 DIAGNOSIS — R25.9 PARKINSONIAN FEATURES: Chronic | ICD-10-CM

## 2023-01-05 DIAGNOSIS — F03.90 DEMENTIA WITHOUT BEHAVIORAL DISTURBANCE: Chronic | ICD-10-CM

## 2023-01-05 DIAGNOSIS — I10 PRIMARY HYPERTENSION: Primary | Chronic | ICD-10-CM

## 2023-01-05 PROBLEM — R29.818 PARKINSONIAN FEATURES: Chronic | Status: ACTIVE | Noted: 2023-01-05

## 2023-01-05 PROCEDURE — 99214 OFFICE O/P EST MOD 30 MIN: CPT | Performed by: INTERNAL MEDICINE

## 2023-01-05 NOTE — ASSESSMENT & PLAN NOTE
Detail Level: Detailed
Follows with neurology.  Patient is currently on Sinemet  twice daily.  Patient's wife voiced concern that this is making him tired.  Given the amount of rigidity and the tremor that the patient has, I recommended to her to continue this medication at the present time.  
Hypertension is unchanged.  Continue current treatment regimen.  Dietary sodium restriction.  Continue current medications.  Blood pressure will be reassessed at the next regular appointment.    Patient's blood pressures at home have been reading 140s over 80s.  Suspect that some of this may be related to the patient's parkinsonian type features as he significantly tenses up whenever the automated cuff is ongoing.  We will be to continue to monitor the blood pressure for now, no indication increase the Norvasc.  
Patient appears to have advanced dementia.  Patient also has parkinsonian type features.  Patient recently had an LP back in November, but did not get much information other than elevated protein out of this.    Do not think that patient would benefit from anything like Namenda or donepezil at this point time.  
Sounds as though the patient was feeling nauseated and was about to throw up, when he passed out and he subsequently threw up several times.  Patient reportedly had been eating a lot more and let different things than he normally does that day.  Patient has not had any issues with appetite or nausea and vomiting since then.    Suspect that the patient had increased intrathoracic pressure and subsequently had a syncopal event  
Price (Use Numbers Only, No Special Characters Or $): 0

## 2023-01-05 NOTE — PROGRESS NOTES
Chief Complaint  Follow-up (Er on 12/27/22/Vomiting and rolling of the eyes , syncope ) and other (Wife worried about sinemet is making pt drowsy )    Subjective          Claudio Frausto presents to [facility]    HPI  Patient is here for ER follow-up.  Patient was taken to the emergency department via EMS on 12/27.  Patient was eating dinner, and he pushed himself away from the table, eyes rolled back, and had a brief tremor, and subsequently there was a concern for lost consciousness.  Patient reportedly threw up after this.  Patient throughout through 4 more times.  The patient's wife indicates that looking back, she thinks that he was probably feeling nauseated and knew he was going to throw up which is why he had the head motion and the eyes rolled back that he had.  Patient had no postictal phase.  Patient was evaluated emergency department, CT scan of the head showed stable ventriculomegaly.  Patient's other work-up was noted to be unremarkable.  Patient had no viral symptoms.    Patient has had no more nausea and vomiting since then.  Patient has been eating normally since then.  Patient even had a cheeseburger and milkshake that evening.    Patient's wife has been monitoring his blood pressure at home, as it was noted to be elevated at the emergency department.  She has been getting 140s over 80s at home.  She is worried that this is high.  Discussed with her that I think part of the issue is that the patient tenses up significantly whenever he is having blood pressure checked most likely due to his parkinsonian type features.    Review of Systems   Constitutional: Positive for fatigue. Negative for activity change, appetite change, chills, diaphoresis and fever.   Respiratory: Negative for cough and shortness of breath.    Neurological: Positive for weakness. Negative for syncope.       Objective   Vital Signs:  /60 (BP Location: Right arm, Patient Position:  Sitting, Cuff Size: Adult)   Pulse 60   Temp 97.1 °F (36.2 °C) (Temporal)   Ht 177.8 cm (70\")   Wt 81.6 kg (180 lb)   SpO2 97%   BMI 25.83 kg/m²   Estimated body mass index is 25.83 kg/m² as calculated from the following:    Height as of this encounter: 177.8 cm (70\").    Weight as of this encounter: 81.6 kg (180 lb).      Physical Exam  Vitals reviewed.   HENT:      Head: Normocephalic and atraumatic.   Cardiovascular:      Rate and Rhythm: Normal rate and regular rhythm.      Heart sounds: Murmur heard.   Pulmonary:      Effort: Pulmonary effort is normal. No respiratory distress.   Abdominal:      General: Abdomen is flat.      Palpations: Abdomen is soft.   Musculoskeletal:      Right lower leg: No edema.      Left lower leg: No edema.   Skin:     General: Skin is warm and dry.   Neurological:      Mental Status: He is alert. Mental status is at baseline. He is disoriented.      Comments: Pill-rolling tremor right hand greater than left hand.  Patient has cogwheel rigidity in all extremities.  Worse in the right upper extremity.   Psychiatric:      Comments: Very flat affect, minimally interactive               Assessment and Plan   Diagnoses and all orders for this visit:    1. Primary hypertension (Primary)  Assessment & Plan:  Hypertension is unchanged.  Continue current treatment regimen.  Dietary sodium restriction.  Continue current medications.  Blood pressure will be reassessed at the next regular appointment.    Patient's blood pressures at home have been reading 140s over 80s.  Suspect that some of this may be related to the patient's parkinsonian type features as he significantly tenses up whenever the automated cuff is ongoing.  We will be to continue to monitor the blood pressure for now, no indication increase the Norvasc.      2. Dementia without behavioral disturbance (HCC)  Assessment & Plan:  Patient appears to have advanced dementia.  Patient also has parkinsonian type features.  Patient  recently had an LP back in November, but did not get much information other than elevated protein out of this.    Do not think that patient would benefit from anything like Namenda or donepezil at this point time.      3. Vasovagal syncope  Assessment & Plan:  Sounds as though the patient was feeling nauseated and was about to throw up, when he passed out and he subsequently threw up several times.  Patient reportedly had been eating a lot more and let different things than he normally does that day.  Patient has not had any issues with appetite or nausea and vomiting since then.    Suspect that the patient had increased intrathoracic pressure and subsequently had a syncopal event      4. Parkinsonian features  Assessment & Plan:  Follows with neurology.  Patient is currently on Sinemet  twice daily.  Patient's wife voiced concern that this is making him tired.  Given the amount of rigidity and the tremor that the patient has, I recommended to her to continue this medication at the present time.      Reviewed 3+ results.  Patient has 2 or more chronic stable conditions.      Patient requested reclining wheelchair.  The family needs this in order to assist with washing his hair, transportation and allowing him to be out of bed more often.     Result Review :  The following data was reviewed by: Black Franco MD on 01/05/2023:  CMP    CMP 8/15/22 11/10/22 12/27/22   Glucose 92 87 113 (A)   BUN 10 12 12   Creatinine 1.11 0.97 0.89   eGFR 65.5  84.5   Sodium 140 140 139   Potassium 3.8 4.1 4.4   Chloride 104 103 102   Calcium 9.7 9.6 9.4   Total Protein  6.7    Total Protein 7.0  7.3   Albumin 3.70 4.10 4.0   Globulin  2.6    Globulin 3.3  3.3   Total Bilirubin 0.6 0.4 0.6   Alkaline Phosphatase 91 86 86   AST (SGOT) 16 13 20   ALT (SGPT) 13 9 <5   Albumin/Globulin Ratio 1.1  1.2   BUN/Creatinine Ratio 9.0 12.4 13.5   Anion Gap 9.0  8.0   (A) Abnormal value       Comments are available for some flowsheets but  are not being displayed.           CBC    CBC 11/3/22 11/10/22 12/27/22   WBC  6.31 8.05   RBC  5.03 4.42   Hemoglobin  14.5 12.7 (A)   Hematocrit  45.1 39.9   MCV  89.7 90.3   MCH  28.8 28.7   MCHC  32.2 31.8   RDW  13.2 13.4   Platelets 247 243 254   (A) Abnormal value            CBC w/diff    CBC w/Diff 11/3/22 11/10/22 12/27/22   WBC  6.31 8.05   RBC  5.03 4.42   Hemoglobin  14.5 12.7 (A)   Hematocrit  45.1 39.9   MCV  89.7 90.3   MCH  28.8 28.7   MCHC  32.2 31.8   RDW  13.2 13.4   Platelets 247 243 254   Neutrophil Rel %  62.8 82.7 (A)   Immature Granulocyte Rel %   0.6 (A)   Lymphocyte Rel %  25.0 9.9 (A)   Monocyte Rel %  5.7 4.1 (A)   Eosinophil Rel %  5.7 2.2   Basophil Rel %  0.6 0.5   (A) Abnormal value            TSH    TSH 11/10/22   TSH 1.500           BMP    BMP 8/15/22 11/10/22 12/27/22   BUN 10 12 12   Creatinine 1.11 0.97 0.89   Sodium 140 140 139   Potassium 3.8 4.1 4.4   Chloride 104 103 102   CO2 27.0 28.0 29.0   Calcium 9.7 9.6 9.4      Comments are available for some flowsheets but are not being displayed.           Data reviewed: Radiologic studies Reviewed the most recent CT scan of his head from his ER visit on 12/27.  Patient noted to have stable enlarged ventricles.     BMI is >= 25 and <30. (Overweight) The following options were offered after discussion;: none (medical contraindication) no indication for weight loss BMI is appropriate           Follow Up   Return in about 3 months (around 4/5/2023) for Recheck.  Patient was given instructions and counseling regarding his condition or for health maintenance advice. Please see specific information pulled into the AVS if appropriate.       FORD Franco MD, [facility], FACP      Electronically signed by Black Franco MD, 01/05/23, 5:04 PM [facility].

## 2023-01-06 ENCOUNTER — TELEPHONE (OUTPATIENT)
Dept: INTERNAL MEDICINE | Facility: CLINIC | Age: 88
End: 2023-01-06

## 2023-01-06 DIAGNOSIS — R25.9 PARKINSONIAN FEATURES: Chronic | ICD-10-CM

## 2023-01-06 DIAGNOSIS — F03.90 DEMENTIA WITHOUT BEHAVIORAL DISTURBANCE: Primary | Chronic | ICD-10-CM

## 2023-01-06 NOTE — TELEPHONE ENCOUNTER
Caller: Tricia Swenson    Relationship: Emergency Contact    Best call back number: [phone]    What is the best time to reach you: ANYTIME    Who are you requesting to speak with (clinical staff, provider,  specific staff member): SARI - ROD    What was the call regarding: M[facility] SWENSON [facility] [address] [place]    Do you require a callback: YES

## 2023-01-06 NOTE — TELEPHONE ENCOUNTER
Caller: Tricia Frausto    Relationship: Emergency Contact    Best call back number: [phone]    What is the best time to reach you: ANY     Who are you requesting to speak with (clinical staff, provider,  specific staff member): CLINICAL     What was the call regarding: WIFE SAYS THAT SHE WAS JUST IN TO SEEN DR. BACK WITH PATIENT. WIFE SAYS THAT SHE I NEEDING A RECLINING WHEELCHAIR FOR PATIENT. SHE IS WANTING THE PRESCRIPTION TO BE SENT TO [facility] IF POSSIBLE.     Do you require a callback: YES

## 2023-01-12 ENCOUNTER — TELEPHONE (OUTPATIENT)
Dept: INTERNAL MEDICINE | Facility: CLINIC | Age: 88
End: 2023-01-12
Payer: MEDICARE

## 2023-01-12 NOTE — TELEPHONE ENCOUNTER
Wife called stating she talked to insurance and they will cover the reclining wheelchair if they know in detail why it's needed. Wife states it would help her wash his hair, help him be able to be up and not in bed all day and to transport better.

## 2023-04-13 ENCOUNTER — TELEPHONE (OUTPATIENT)
Dept: UROLOGY | Facility: CLINIC | Age: 88
End: 2023-04-13
Payer: MEDICARE

## 2023-04-13 NOTE — TELEPHONE ENCOUNTER
[facility] called verifying that we received the medical necessity form.  After checking with Mallika, we have received it and will get it faxed today.

## 2023-08-02 NOTE — TELEPHONE ENCOUNTER
Patient is needing new script send into mail order for 90 days supply     Requested Prescriptions     Pending Prescriptions Disp Refills    carbidopa-levodopa (SINEMET)  MG per tablet 360 tablet 5     Sig: Take 2 pills in am and the afternoon       Last Office Visit: 6/15/2023  Next Office Visit: 9/21/2023

## 2023-11-09 ENCOUNTER — OFFICE VISIT (OUTPATIENT)
Dept: NEUROLOGY | Age: 88
End: 2023-11-09
Payer: MEDICARE

## 2023-11-09 VITALS
DIASTOLIC BLOOD PRESSURE: 68 MMHG | SYSTOLIC BLOOD PRESSURE: 110 MMHG | BODY MASS INDEX: 26.96 KG/M2 | WEIGHT: 182 LBS | OXYGEN SATURATION: 98 % | HEIGHT: 69 IN | HEART RATE: 64 BPM

## 2023-11-09 DIAGNOSIS — R27.0 ATAXIA: ICD-10-CM

## 2023-11-09 DIAGNOSIS — R41.3 MEMORY LOSS: Primary | ICD-10-CM

## 2023-11-09 DIAGNOSIS — G20 PARKINSONISM, UNSPECIFIED PARKINSONISM TYPE: ICD-10-CM

## 2023-11-09 PROCEDURE — 1123F ACP DISCUSS/DSCN MKR DOCD: CPT | Performed by: PSYCHIATRY & NEUROLOGY

## 2023-11-09 PROCEDURE — 99214 OFFICE O/P EST MOD 30 MIN: CPT | Performed by: PSYCHIATRY & NEUROLOGY

## 2023-11-09 NOTE — PROGRESS NOTES
REVIEW OF SYSTEMS    Constitutional: []Fever []Sweats []Chills [] Recent Injury   [x] Denies all unless marked  HENT:[]Headache  [] Head Injury  [] Sore Throat  [] Ear Pain  [] Dizziness [] Hearing Loss   [x] Denies all unless marked  Spine:  [] Neck pain  [] Back pain  [] Sciatica  [x] Denies all unless marked  Cardiovascular:[]Chest Pain []Palpitations [] Heart Disease  [x] Denies all unless marked  Pulmonary: []Shortness of Breath []Cough   [x] Denies all unless marked  Gastrointestinal:  []Abdominal Pain  []Blood in Stool  []Diarrhea []Constipation []Nausea  []Vomiting  [x] Denies all unless marked  Genitourinary:  [] Dysuria [] Frequency  [] Incontinence [] Urgency   [x] Denies all unless marked  Musculoskeletal: [] Arthralgia  [] Myalgias [] Muscle cramps  [] Muscle twitches   [x] Denies all unless marked   Extremities:   [] Pain   [] Swelling   [x] Denies all unless marked  Skin:[] Rash  [] Color Change  [x] Denies all unless marked  Neurological:[] Visual Disturbance [] Double Vision [] Slurred Speech [] Trouble swallowing  [] Vertigo [] Tingling [] Numbness [] Weakness [x] Loss of Balance   [] Loss of Consciousness [x] Memory Loss [] Seizures  [] Denies all unless marked  Psychiatric/Behavioral:[] Depression [] Anxiety  [x] Denies all unless marked  Sleep: []  Insomnia [] Sleep Disturbance [] Snoring [] Restless Legs [] Daytime Sleepiness [] Sleep Apnea  [x] Denies all unless marked
247 11/03/2022     Lab Results   Component Value Date     12/15/2016    K 4.3 12/15/2016     12/15/2016    CO2 27 12/15/2016    BUN 18 12/15/2016    CREATININE 1.0 12/15/2016    GLUCOSE 95 12/15/2016    CALCIUM 9.8 12/15/2016    LABGLOM >60 12/15/2016           Assessment    ICD-10-CM    1. Memory loss  R41.3       2. Parkinsonism, unspecified Parkinsonism type  G20. C       3. Ataxia  R27.0                 Patient does have parkinsonism with a rather severe dementia. There was no concern for NPH when his cognitive difficulties began but now having large ventricles. Unfortunately radiology could not get a significant amount of CSF out. He seems to be responding somewhat to Sinemet. We discussed going up to 2 pills 3 times a day but watching for side effects. He reportedly is a DNR and I have requested that she make sure the EMS are aware. Parkinsonism and ataxia are stable. Plan          Return in about 6 months (around 5/9/2024).     (Please note that portions of this note were completed with a voice recognition program. Efforts were made to edit the dictations but occasionally words are mis-transcribed.)

## 2023-11-16 ENCOUNTER — OFFICE VISIT (OUTPATIENT)
Dept: INTERNAL MEDICINE | Facility: CLINIC | Age: 88
End: 2023-11-16
Payer: MEDICARE

## 2023-11-16 VITALS
TEMPERATURE: 98.1 F | BODY MASS INDEX: 21.99 KG/M2 | OXYGEN SATURATION: 99 % | DIASTOLIC BLOOD PRESSURE: 76 MMHG | HEART RATE: 66 BPM | HEIGHT: 70 IN | SYSTOLIC BLOOD PRESSURE: 142 MMHG | WEIGHT: 153.6 LBS

## 2023-11-16 DIAGNOSIS — E78.5 HYPERLIPIDEMIA, UNSPECIFIED HYPERLIPIDEMIA TYPE: ICD-10-CM

## 2023-11-16 DIAGNOSIS — F03.90 DEMENTIA WITHOUT BEHAVIORAL DISTURBANCE: Chronic | ICD-10-CM

## 2023-11-16 DIAGNOSIS — K59.01 SLOW TRANSIT CONSTIPATION: ICD-10-CM

## 2023-11-16 DIAGNOSIS — R73.03 PREDIABETES: ICD-10-CM

## 2023-11-16 DIAGNOSIS — R29.818 PARKINSONIAN FEATURES: Chronic | ICD-10-CM

## 2023-11-16 DIAGNOSIS — I10 PRIMARY HYPERTENSION: Primary | Chronic | ICD-10-CM

## 2023-11-16 DIAGNOSIS — E03.9 HYPOTHYROIDISM, UNSPECIFIED TYPE: ICD-10-CM

## 2023-11-16 PROBLEM — K59.00 CONSTIPATION: Status: ACTIVE | Noted: 2023-11-16

## 2023-11-16 NOTE — PROGRESS NOTES
The ABCs of the Annual Wellness Visit  Subsequent Medicare Wellness Visit    Chief Complaint   Patient presents with    Medicare Wellness-subsequent      Subjective    History of Present Illness:  Claudio Frausto is a 87 y.o. male who presents for a Subsequent Medicare Wellness Visit.    The following portions of the patient's history were reviewed and   updated as appropriate: allergies, current medications, past family history, past medical history, past social history, past surgical history and problem list.    Compared to one year ago, the patient feels his physical   health is worse.    Compared to one year ago, the patient feels his mental   health is the same.    Recent Hospitalizations:  He was not admitted to the hospital during the last year.       Current Medical Providers:  Patient Care Team:  Black Franco MD as PCP - General (Internal Medicine)  Rose Pascual MD as Consulting Physician (Dermatology)    Outpatient Medications Prior to Visit   Medication Sig Dispense Refill    amLODIPine (NORVASC) 5 MG tablet Take 1 tablet by mouth Daily. 90 tablet 3    carbidopa-levodopa (SINEMET)  MG per tablet Take 2 tablets by mouth 3 (Three) Times a Day. 2 in am, 1 at lunch and 2 in pm, will increase to 2 tablets TID 11/20/23.       No facility-administered medications prior to visit.       No opioid medication identified on active medication list. I have reviewed chart for other potential  high risk medication/s and harmful drug interactions in the elderly.        Aspirin is not on active medication list.  Aspirin use is not indicated based on review of current medical condition/s. Risk of harm outweighs potential benefits.  .    Patient Active Problem List   Diagnosis    Actinic keratoses    Anal fissure    Hypertension    Benign prostate hyperplasia    Excessive sleepiness    Memory loss    PFO (patent foramen ovale)    Type 2 diabetes mellitus with hyperglycemia    Hypothyroidism     "Hyperlipidemia    Syncope    Dementia without behavioral disturbance    Parkinsonian features     Advance Care Planning  Advance Directive is on file.  ACP discussion was held with the patient during this visit. Patient has an advance directive in EMR which is still valid.        Objective    Vitals:    23 1519   BP: 142/76   BP Location: Left arm   Patient Position: Sitting   Cuff Size: Adult   Pulse: 66   Temp: 98.1 °F (36.7 °C)   TempSrc: Temporal   SpO2: 99%   Weight: 69.7 kg (153 lb 9.6 oz)   Height: 177.8 cm (70\")   PainSc: 0-No pain     Estimated body mass index is 22.04 kg/m² as calculated from the following:    Height as of this encounter: 177.8 cm (70\").    Weight as of this encounter: 69.7 kg (153 lb 9.6 oz).    BMI is within normal parameters. No other follow-up for BMI required.      Does the patient have evidence of cognitive impairment? Yes                HEALTH RISK ASSESSMENT    Smoking Status:  Social History     Tobacco Use   Smoking Status Never   Smokeless Tobacco Never     Alcohol Consumption:  Social History     Substance and Sexual Activity   Alcohol Use No     Fall Risk Screen:    STEADI Fall Risk Assessment was completed, and patient is at MODERATE risk for falls. Assessment completed on:2023    Depression Screenin/16/2023     3:31 PM   PHQ-2/PHQ-9 Depression Screening   Little Interest or Pleasure in Doing Things 0-->not at all   Feeling Down, Depressed or Hopeless 0-->not at all   PHQ-9: Brief Depression Severity Measure Score 0       Health Habits and Functional and Cognitive Screenin/16/2023     3:30 PM   Functional & Cognitive Status   Do you have difficulty preparing food and eating? No   Do you have difficulty bathing yourself, getting dressed or grooming yourself? No   Do you have difficulty using the toilet? No   Do you have difficulty moving around from place to place? No   Do you have trouble with steps or getting out of a bed or a chair? No "   Current Diet Well Balanced Diet   Dental Exam Up to date   Eye Exam Up to date   Exercise (times per week) 0 times per week   Current Exercises Include No Regular Exercise   Do you need help using the phone?  Yes   Are you deaf or do you have serious difficulty hearing?  No   Do you need help to go to places out of walking distance? Yes   Do you need help shopping? Yes   Do you need help preparing meals?  Yes   Do you need help with housework?  Yes   Do you need help with laundry? Yes   Do you need help taking your medications? Yes   Do you need help managing money? Yes   Do you ever drive or ride in a car without wearing a seat belt? No   Have you felt unusual stress, anger or loneliness in the last month? No   Who do you live with? Spouse   If you need help, do you have trouble finding someone available to you? No   Have you been bothered in the last four weeks by sexual problems? No   Do you have difficulty concentrating, remembering or making decisions? Yes       Age-appropriate Screening Schedule:  Refer to the list below for future screening recommendations based on patient's age, sex and/or medical conditions. Orders for these recommended tests are listed in the plan section. The patient has been provided with a written plan.    Health Maintenance   Topic Date Due    URINE MICROALBUMIN  Never done    ZOSTER VACCINE (1 of 2) Never done    HEMOGLOBIN A1C  05/10/2023    DIABETIC EYE EXAM  09/14/2023    ANNUAL WELLNESS VISIT  11/10/2023    LIPID PANEL  11/10/2023    TDAP/TD VACCINES (2 - Td or Tdap) 01/25/2028    COVID-19 Vaccine  Completed    INFLUENZA VACCINE  Completed    Pneumococcal Vaccine 65+  Completed              Assessment & Plan   CMS Preventative Services Quick Reference  Risk Factors Identified During Encounter  Fall Risk-High or Moderate: Discussed Fall Prevention in the home  Dental Screening Recommended  Vision Screening Recommended  The above risks/problems have been discussed with the  patient.  Follow up actions/plans if indicated are seen below in the Assessment/Plan Section.  Pertinent information has been shared with the patient in the After Visit Summary.    Diagnoses and all orders for this visit:    1. Primary hypertension (Primary)  -     CBC & Differential  -     Comprehensive Metabolic Panel  -     Urinalysis With Microscopic - Urine, Clean Catch    2. Hypothyroidism, unspecified type  -     TSH Rfx On Abnormal To Free T4    3. Hyperlipidemia, unspecified hyperlipidemia type  -     Lipid Panel  -     TSH Rfx On Abnormal To Free T4    4. Dementia without behavioral disturbance    5. Parkinsonian features    6. Prediabetes  -     Microalbumin / Creatinine Urine Ratio - Urine, Clean Catch  -     Hemoglobin A1c    7. Slow transit constipation        Recommended bowel regimen.  Fiber daily.  1/2 cap miralax daily.  Docusate-senna if no BM at 24 hours.  Then can transition to suppositories if no luck.    Labs today    Patient walks with a walker at home.    Patient non verbal for most of encounter but did acknowledge some of the discussion when I talked to him.  Responded well to his wife.    Patient's wife indicates he is a DNR.    Patient sits a lot, avoid excessive pressure to avoid pressure wounds.        Result Review :           Follow Up:   Return in about 6 months (around 5/16/2024), or if symptoms worsen or fail to improve, for Recheck.     An After Visit Summary and PPPS were made available to the patient.                   Patient has been erroneously marked as diabetic. Based on the available clinical information, he does not have diabetes and should therefore be excluded from diabetic health maintenance and quality measures for the remainder of the reporting period.        FORD Franco MD, FACP, formerly [facility]      Electronically signed by Black Franco MD, 11/16/23, 3:39 PM CST.

## 2023-11-17 LAB
ALBUMIN SERPL-MCNC: 4.3 G/DL (ref 3.5–5.2)
ALBUMIN/GLOB SERPL: 1.5 G/DL
ALP SERPL-CCNC: 81 U/L (ref 39–117)
ALT SERPL-CCNC: 6 U/L (ref 1–41)
AST SERPL-CCNC: 11 U/L (ref 1–40)
BASOPHILS # BLD AUTO: 0.04 10*3/MM3 (ref 0–0.2)
BASOPHILS NFR BLD AUTO: 0.7 % (ref 0–1.5)
BILIRUB SERPL-MCNC: 0.7 MG/DL (ref 0–1.2)
BUN SERPL-MCNC: 11 MG/DL (ref 8–23)
BUN/CREAT SERPL: 13.9 (ref 7–25)
CALCIUM SERPL-MCNC: 10.2 MG/DL (ref 8.6–10.5)
CHLORIDE SERPL-SCNC: 103 MMOL/L (ref 98–107)
CHOLEST SERPL-MCNC: 152 MG/DL (ref 0–200)
CO2 SERPL-SCNC: 27.4 MMOL/L (ref 22–29)
CREAT SERPL-MCNC: 0.79 MG/DL (ref 0.76–1.27)
EGFRCR SERPLBLD CKD-EPI 2021: 87.1 ML/MIN/1.73
EOSINOPHIL # BLD AUTO: 0.3 10*3/MM3 (ref 0–0.4)
EOSINOPHIL NFR BLD AUTO: 5.2 % (ref 0.3–6.2)
ERYTHROCYTE [DISTWIDTH] IN BLOOD BY AUTOMATED COUNT: 13.9 % (ref 12.3–15.4)
GLOBULIN SER CALC-MCNC: 2.8 GM/DL
GLUCOSE SERPL-MCNC: 94 MG/DL (ref 65–99)
HBA1C MFR BLD: 5.3 % (ref 4.8–5.6)
HCT VFR BLD AUTO: 43.2 % (ref 37.5–51)
HDLC SERPL-MCNC: 65 MG/DL (ref 40–60)
HGB BLD-MCNC: 13.3 G/DL (ref 13–17.7)
IMM GRANULOCYTES # BLD AUTO: 0.01 10*3/MM3 (ref 0–0.05)
IMM GRANULOCYTES NFR BLD AUTO: 0.2 % (ref 0–0.5)
LDLC SERPL CALC-MCNC: 73 MG/DL (ref 0–100)
LYMPHOCYTES # BLD AUTO: 1.83 10*3/MM3 (ref 0.7–3.1)
LYMPHOCYTES NFR BLD AUTO: 31.8 % (ref 19.6–45.3)
MCH RBC QN AUTO: 27.9 PG (ref 26.6–33)
MCHC RBC AUTO-ENTMCNC: 30.8 G/DL (ref 31.5–35.7)
MCV RBC AUTO: 90.8 FL (ref 79–97)
MONOCYTES # BLD AUTO: 0.34 10*3/MM3 (ref 0.1–0.9)
MONOCYTES NFR BLD AUTO: 5.9 % (ref 5–12)
NEUTROPHILS # BLD AUTO: 3.23 10*3/MM3 (ref 1.7–7)
NEUTROPHILS NFR BLD AUTO: 56.2 % (ref 42.7–76)
NRBC BLD AUTO-RTO: 0.2 /100 WBC (ref 0–0.2)
PLATELET # BLD AUTO: 260 10*3/MM3 (ref 140–450)
POTASSIUM SERPL-SCNC: 4.5 MMOL/L (ref 3.5–5.2)
PROT SERPL-MCNC: 7.1 G/DL (ref 6–8.5)
RBC # BLD AUTO: 4.76 10*6/MM3 (ref 4.14–5.8)
SODIUM SERPL-SCNC: 140 MMOL/L (ref 136–145)
TRIGL SERPL-MCNC: 74 MG/DL (ref 0–150)
TSH SERPL DL<=0.005 MIU/L-ACNC: 1.07 UIU/ML (ref 0.27–4.2)
VLDLC SERPL CALC-MCNC: 14 MG/DL (ref 5–40)
WBC # BLD AUTO: 5.75 10*3/MM3 (ref 3.4–10.8)

## 2024-02-08 RX ORDER — AMLODIPINE BESYLATE 5 MG/1
5 TABLET ORAL DAILY
Qty: 90 TABLET | Refills: 3 | Status: SHIPPED | OUTPATIENT
Start: 2024-02-08

## 2024-02-27 ENCOUNTER — TELEPHONE (OUTPATIENT)
Dept: NEUROLOGY | Age: 89
End: 2024-02-27

## 2024-02-27 NOTE — TELEPHONE ENCOUNTER
Patient's mail order pharmacy is a week behind on delivery of this medication so patient is requesting a 30 day supply be sent to the local pharmacy. Thank you.      Requested Prescriptions     Pending Prescriptions Disp Refills    carbidopa-levodopa (SINEMET)  MG per tablet 180 tablet 1     Sig: Take 2 tablets by mouth 3 times daily       Last Office Visit: 11/9/2023  Next Office Visit: 5/16/2024

## 2024-03-07 ENCOUNTER — OFFICE VISIT (OUTPATIENT)
Dept: INTERNAL MEDICINE | Facility: CLINIC | Age: 89
End: 2024-03-07
Payer: MEDICARE

## 2024-03-07 VITALS
DIASTOLIC BLOOD PRESSURE: 64 MMHG | SYSTOLIC BLOOD PRESSURE: 136 MMHG | HEIGHT: 70 IN | WEIGHT: 153 LBS | BODY MASS INDEX: 21.9 KG/M2 | TEMPERATURE: 97.8 F | HEART RATE: 74 BPM | OXYGEN SATURATION: 98 %

## 2024-03-07 DIAGNOSIS — M21.619 BUNION: ICD-10-CM

## 2024-03-07 DIAGNOSIS — R29.818 PARKINSONIAN FEATURES: ICD-10-CM

## 2024-03-07 DIAGNOSIS — F03.90 DEMENTIA WITHOUT BEHAVIORAL DISTURBANCE: ICD-10-CM

## 2024-03-07 DIAGNOSIS — M71.179: Primary | ICD-10-CM

## 2024-03-07 RX ORDER — HYDROXYZINE HYDROCHLORIDE 25 MG/1
25 TABLET, FILM COATED ORAL 3 TIMES DAILY PRN
Qty: 90 TABLET | Refills: 2 | Status: SHIPPED | OUTPATIENT
Start: 2024-03-07

## 2024-03-08 ENCOUNTER — HOME HEALTH ADMISSION (OUTPATIENT)
Dept: HOME HEALTH SERVICES | Facility: HOME HEALTHCARE | Age: 89
End: 2024-03-08
Payer: COMMERCIAL

## 2024-03-12 NOTE — PROGRESS NOTES
"      Chief Complaint  Bunions (Right foot ) and scratching (Pt has started scratching all the time )    Subjective        Claudio Frausto presents to [facility]    HPI    Patient here for the above problems.  See Assessment and Plan for further HPI components.      Review of Systems    Objective   Vital Signs:  /64 (BP Location: Left arm, Patient Position: Sitting, Cuff Size: Adult)   Pulse 74   Temp 97.8 °F (36.6 °C) (Temporal)   Ht 177.8 cm (70\")   Wt 69.4 kg (153 lb)   SpO2 98%   BMI 21.95 kg/m²   Estimated body mass index is 21.95 kg/m² as calculated from the following:    Height as of this encounter: 177.8 cm (70\").    Weight as of this encounter: 69.4 kg (153 lb).      Physical Exam  Vitals and nursing note reviewed.   Constitutional:       Appearance: He is not ill-appearing.   Eyes:      General: No scleral icterus.     Conjunctiva/sclera: Conjunctivae normal.   Pulmonary:      Effort: Pulmonary effort is normal. No respiratory distress.   Musculoskeletal:        Feet:    Skin:     Comments: Dry skin  Excoriations throughout   Neurological:      General: No focal deficit present.      Mental Status: He is alert and oriented to person, place, and time.   Psychiatric:         Mood and Affect: Mood normal.         Behavior: Behavior normal.                       Assessment and Plan   Diagnoses and all orders for this visit:    1. Infection of bunion (Primary)    2. Bunion  -     Ambulatory Referral to Home Health    3. Dementia without behavioral disturbance    4. Parkinsonian features    Other orders  -     hydrOXYzine (ATARAX) 25 MG tablet; Take 1 tablet by mouth 3 (Three) Times a Day As Needed for Itching.  Dispense: 90 tablet; Refill: 2      Patient has a wound on his right bunion does not appear infected.  There was not a CPT code for wound on bunion.  This appears to be on the top aspect and appears to be from pressure/friction.  His wife has tried " "antibiotic cream and tried \"doctoring\" it at home without any improvement.  There is no exposed bone just subtcutaneous tissue.  Discussed keeping pressure off of it and keeping him from rubbing it on his other leg.  Will apply light dressing today.  Will get home health wound care to come work on it.  It is very difficult to get him out of the house at times so will trial home health wound care but if we need to we will send to wound care provider.    The patient has also noted to have worsening itching recently and dry skin.  He scratches a lot even when she tries to hold his hands and stop him from doing it he will often find a way.  Discuss avoiding hot showers, cream/ointment, dove sensative skin soap.  Will trial hydroxyzine PRN to see if it helps with the itching.  His dementia and parkisonian features make this more difficult to manage.  Despite frequent reminders and barriers she cannot distract him away from it or get him to remember to not scratch.  May have to to utilize mittens if continues to scratch deeply.      Result Review :           BMI is within normal parameters. No other follow-up for BMI required.      BMI is within normal parameters. No other follow-up for BMI required.            Follow Up   No follow-ups on file.  Patient was given instructions and counseling regarding his condition or for health maintenance advice. Please see specific information pulled into the AVS if appropriate.       FORD Franco MD, FACP, [facility]      Electronically signed by Black Franco MD, 03/12/24, 9:03 AM CDT.          "

## 2024-03-13 ENCOUNTER — HOME CARE VISIT (OUTPATIENT)
Dept: HOME HEALTH SERVICES | Facility: CLINIC | Age: 89
End: 2024-03-13
Payer: COMMERCIAL

## 2024-03-13 PROCEDURE — G0299 HHS/HOSPICE OF RN EA 15 MIN: HCPCS

## 2024-03-13 NOTE — Clinical Note
Resumption of Care Note: AFTAB post hospital and rehab stay due to Chronic respitory failure. Patient alert and pleasant. Knowledgable regarding her care.     Reason for hospitalization/new problems: Chronic respitory failure    AFTAB [place] Findings:Alert and pleasant. Very sob with exertion. required a 5 minute recovery period due to sob after ambulating to the door. O2 per cannula at 2L. Patient lives with her son who assists with her care. Very knowledgable regarding medications. Numerous medication inconsistancies. Call to MD.    New/changed medications: Buspar 10mg po tid, Mucinex 600 mg/ 2 tab bid. Numerous inhalers sent with no orders. Call to MD to jeanne.    New/changed orders: Teaching and assessment regarding home management of chronic respitory failure.  Physical Therapy for strengthening and safety.     Educated on Emergency Plan, steps to take prior to going to the ER and when to Call Home Health First:  Instructed to call home health prior to ER visit unless an emergency occurs.     Plan/Focus of Care and Skilled need: Teaching and assessment regarding chronic respitory failure, medication, O2, and safety.     Plan for next visit: Teaching and assessment regarding home management of ulcer on right 2nd toe

## 2024-03-14 NOTE — HOME HEALTH
SOC Note: 88 year old male admitted to [facility] due to 0 ,5x0 .5 ulcer to bunion on right 2nd toe per referral of Dr Tsang. Wife has been washing bid with antibacerial soap, patting dry, applying neosporin, and covering with gauze. Pt pleasant but is disoriented x 4. Patient has a full time caregiver    Home Health ordered for: disciplines SN    Reason for Hosp/Primary Dx/Co-morbidities: Ulcer to bunion of right 2nd toe.     Focus of Care: Bunion right 2nd toe    Patient's goal(s):Per wife/to heal ulcer    Current Functional status/mobility/DME: Amb with walker and assist of one    HB status/Living Arrangements: Lives with wife and patient has a caregiver when wife is not present    Skin Integrity/wound status: Intact and fragile except for ulcer on right 2nd toe    Code Status: full code    Fall Risk/Safety concerns: Patient is a fall risk    Educated on Emergency Plan, steps to take prior to going to the ER and when to Call Home Health First:  Instructed to call home health firrst     Medication issues/Concerns:none    Additional Problems/Concerns: dementia    SDOH Barriers (i.e. caregiver concerns, social isolation, transportation, food insecurity, environment, income etc.)N/A  Plan for next visit: wound assessment and care of bunion wd on right foot

## 2024-03-15 ENCOUNTER — TELEPHONE (OUTPATIENT)
Dept: INTERNAL MEDICINE | Facility: CLINIC | Age: 89
End: 2024-03-15

## 2024-03-15 NOTE — TELEPHONE ENCOUNTER
Caller: [facility]    Relationship: Other    Best call back number:  [phone]     What is the best time to reach you: ANY, BUT GENERAL MESSAGE    Who are you requesting to speak with (clinical staff, provider,  specific staff member): CLINICAL           What was the call regarding: HOME HEALTH LETTING US KNOW HE WAS AD[facility]ITT[facility]

## 2024-03-16 VITALS
HEART RATE: 70 BPM | TEMPERATURE: 96.8 F | OXYGEN SATURATION: 97 % | DIASTOLIC BLOOD PRESSURE: 80 MMHG | WEIGHT: 160 LBS | BODY MASS INDEX: 25.71 KG/M2 | RESPIRATION RATE: 20 BRPM | HEIGHT: 66 IN | SYSTOLIC BLOOD PRESSURE: 122 MMHG

## 2024-03-20 ENCOUNTER — HOME CARE VISIT (OUTPATIENT)
Dept: HOME HEALTH SERVICES | Facility: CLINIC | Age: 89
End: 2024-03-20
Payer: COMMERCIAL

## 2024-03-20 VITALS
TEMPERATURE: 98.3 F | HEART RATE: 67 BPM | DIASTOLIC BLOOD PRESSURE: 70 MMHG | SYSTOLIC BLOOD PRESSURE: 110 MMHG | OXYGEN SATURATION: 97 % | RESPIRATION RATE: 18 BRPM

## 2024-03-20 PROCEDURE — G0299 HHS/HOSPICE OF RN EA 15 MIN: HCPCS

## 2024-03-20 NOTE — Clinical Note
Left message for patient's spouse to please obtain a size larger shoes with a wide width across toes. Paients's socks are too tight and toes are toching end of shoes, Patient,s toes are purple with impaired circulation after removing shoes and examining feet and wound to right big toe. Encouraged proper foot care.

## 2024-03-20 NOTE — HOME HEALTH
FOCUS OF CARE/SKILLED NEED: wound care    TEACHING/INTERVENTIONS:wound care, encouraged caregiver to buy patient some bigger shoes with a more widened with at the end and socks that arent so tight, toes are touching the end of his shoes    PROGRESS TOWARD GOALS: ongoing and progressing    PHYSICIAN CONTACT:  no    INSURANCE CHANGES?  no    FALLS SINCE LAST VISIT?  no    MEDICATION CHANGES? no    PLAN FOR NEXT VISIT: wound care dressing change photo and measurements  PRE-SCREENED FOR COVID?
Alert/Awake

## 2024-03-27 ENCOUNTER — HOME CARE VISIT (OUTPATIENT)
Dept: HOME HEALTH SERVICES | Facility: CLINIC | Age: 89
End: 2024-03-27
Payer: COMMERCIAL

## 2024-03-27 PROCEDURE — G0299 HHS/HOSPICE OF RN EA 15 MIN: HCPCS

## 2024-03-27 NOTE — Clinical Note
Discharge Summary/Summary of Care Provided: Patient was treated for wound to right great toe with wound care teaching and assessment for feet and legs. It was determined that shoes were nonfitting aiding to wound healing impairment. Caregiver obtained size larger shoes and patient wound is scabbed over and SUSHILA, to wear bandaid for protection while wearing shoes that rub against toe  Patient received home health for diagnosis: Wounnd to right great toe  Current level of functional ability: Ambulatory with assist of one in home  Living arrangements: Lives with spouse and 24/hr supervision  Progress towards goals and/or Were all goals met? yes  If not all goals met, barriers that prevented patient from meeting goals: Goals met  SDOH concerns (i.e. Caregiver availability, social isolation, environment, income, transportation access, food insecurity etc.)no  Follow-up appointment plans and community resources provided:  Other imporant information. Call PCP for any other concers or medical issues

## 2024-03-28 VITALS
DIASTOLIC BLOOD PRESSURE: 76 MMHG | TEMPERATURE: 97.9 F | RESPIRATION RATE: 18 BRPM | OXYGEN SATURATION: 98 % | HEART RATE: 76 BPM | SYSTOLIC BLOOD PRESSURE: 110 MMHG

## 2024-03-29 NOTE — TELEPHONE ENCOUNTER
Requested Prescriptions     Pending Prescriptions Disp Refills    carbidopa-levodopa (SINEMET)  MG per tablet [Pharmacy Med Name: CARBIDOPA/LEVODOPA 25MG-100MG TABLET] 180 tablet 5     Sig: TAKE 2 TABLETS BY MOUTH 3 TIMES DAILY       Last Office Visit: 11/9/2023  Next Office Visit: 5/16/2024  Last Medication Refill: 2/27/24 for a 30 day supply

## 2024-04-24 NOTE — TELEPHONE ENCOUNTER
Called patient's wife to schedule assessment for patient today. Wife said she can't get patient out in his condition today.

## 2024-04-24 NOTE — TELEPHONE ENCOUNTER
Caller: Tricia Frausto    Relationship: Emergency Contact    Best call back number: [phone]     What medication are you requesting: FOR SYMPTOMS    What are your current symptoms: COUGH, CONGESTION    How long have you been experiencing symptoms: 2 DAYS AGO    Have you had these symptoms before:    [x] Yes  [] No    Have you been treated for these symptoms before:   [x] Yes  [] No    If a prescription is needed, what is your preferred pharmacy and phone number: [place], [place] - [address] [phone] [facility] [phone] FX

## 2024-04-25 NOTE — TELEPHONE ENCOUNTER
Discussed with wife and she wants Paxlovid sent in as she says he has chest congestion and is worse at night.

## 2024-04-25 NOTE — TELEPHONE ENCOUNTER
WIFE CALLED IN TO LET DR BACK AND ROD KNOW THAT SHE COULDN'T FIND A FLU TEST BUT THE COVID TEST WAS POSITIVE.   PLEASE CALL PATIENT'S WIFE BACK TO LET HER KNOW WHAT TO DO..  [phone]

## 2024-04-29 ENCOUNTER — TELEPHONE (OUTPATIENT)
Dept: INTERNAL MEDICINE | Facility: CLINIC | Age: 89
End: 2024-04-29

## 2024-04-29 DIAGNOSIS — U07.1 COVID-19: Primary | ICD-10-CM

## 2024-04-29 RX ORDER — HYDROCODONE POLISTIREX AND CHLORPHENIRAMINE POLISTIREX 10; 8 MG/5ML; MG/5ML
5 SUSPENSION, EXTENDED RELEASE ORAL EVERY 12 HOURS PRN
Qty: 100 ML | Refills: 0 | Status: SHIPPED | OUTPATIENT
Start: 2024-04-29 | End: 2024-04-30 | Stop reason: SDUPTHER

## 2024-04-29 RX ORDER — AZITHROMYCIN 250 MG/1
250 TABLET, FILM COATED ORAL TAKE AS DIRECTED
Qty: 6 TABLET | Refills: 0 | Status: SHIPPED | OUTPATIENT
Start: 2024-04-29

## 2024-04-29 RX ORDER — PREDNISONE 10 MG/1
TABLET ORAL
Qty: 18 TABLET | Refills: 0 | Status: SHIPPED | OUTPATIENT
Start: 2024-04-29

## 2024-04-29 NOTE — TELEPHONE ENCOUNTER
Caller: [facility] [place] [place] - [address] - [phone]  - [phone] FX    Relationship: Pharmacy  SPOKE WITH MIREYA Jones call back number: [phone]     What is the best time to reach you: ANYTIME    Who are you requesting to speak with (clinical staff, provider,  specific staff member): CLINICAL    What was the call regarding:   Hydrocod Misael-Chlorphe Misael ER (TUSSIONEX PENNKINETIC) 10-8 MG/5ML ER suspension THIS IS A C2 AND THEY DO NOT BREAK THESE BOXES. WILL NEED A NEW PRESCRIPTION. PLEASE CALL

## 2024-04-29 NOTE — TELEPHONE ENCOUNTER
"Wife called, stating patient is \"coughing horribly\", since luis Covid.  Says it is a deep cough and nearly constant at night.  She is not sure if it is productive or not, as she says he \"won't spit\".  She says he looks ok and is eating ok, afebrile, but cannot control the coughing.  He has 2 doses left of Paxlovid.  She has given him Robitussin, but doesn't help.  Please advise.  (She did mention that she had surgery 2 weeks ago and is physically unable to get him to the office).    "

## 2024-04-30 DIAGNOSIS — U07.1 COVID-19: ICD-10-CM

## 2024-04-30 RX ORDER — HYDROCODONE POLISTIREX AND CHLORPHENIRAMINE POLISTIREX 10; 8 MG/5ML; MG/5ML
5 SUSPENSION, EXTENDED RELEASE ORAL EVERY 12 HOURS PRN
Qty: 115 ML | Refills: 0 | Status: SHIPPED | OUTPATIENT
Start: 2024-04-30

## 2024-04-30 NOTE — TELEPHONE ENCOUNTER
Caller: [place], [place] - [address] - [phone] [facility] [phone] FX    Relationship: Pharmacy    Best call back number: [phone]     Requested Prescriptions:   Requested Prescriptions     Pending Prescriptions Disp Refills    Hydrocod Misael-Chlorphe Misael ER (TUSSIONEX PENNKINETIC) 10-8 MG/5ML ER suspension 100 mL 0     Sig: Take 5 mL by mouth Every 12 (Twelve) Hours As Needed for Cough.        Pharmacy where request should be sent: [facility], [place] - [address] [phone] [facility] [phone] FX     Last office visit with prescribing clinician: 3/7/2024   Last telemedicine visit with prescribing clinician: Visit date not found   Next office visit with prescribing clinician: 5/16/2024     Additional details provided by patient: PHARMACY STATES THEY CAN NOT GIVE MEDICATION  IT IS ONLY GIVING  ML, SO NEED IT TO BE SENT 115 ML.    Does the patient have less than a 3 day supply:  [x] Yes  [] No    Would you like a call back once the refill request has been completed: [] Yes [x] No    If the office needs to give you a call back, can they leave a voicemail: [] Yes [x] No    Joseph Okeefe Rep   04/30/24 10:24 CDT

## 2024-05-05 ENCOUNTER — APPOINTMENT (OUTPATIENT)
Dept: GENERAL RADIOLOGY | Facility: HOSPITAL | Age: 89
End: 2024-05-05
Payer: MEDICARE

## 2024-05-05 PROCEDURE — 71046 X-RAY EXAM CHEST 2 VIEWS: CPT

## 2024-05-06 ENCOUNTER — TELEPHONE (OUTPATIENT)
Dept: INTERNAL MEDICINE | Facility: CLINIC | Age: 89
End: 2024-05-06

## 2024-05-06 NOTE — TELEPHONE ENCOUNTER
Called and spoke with patients wife.  She voiced understanding- describes it sounding like someone sucking out of a straw in an empty cup-  She does not think a flutter valve would do much good because he will not follow instructions

## 2024-05-06 NOTE — TELEPHONE ENCOUNTER
Caller: Tricia Frausto     Relationship:EMERGENCY CONTACT    Best call back number:     [phone] (Mobile)-  VERBAL, REQUESTING A CALLBACK       What is your medical concern?  TERRIBLE COUGH    How long has this issue been going on?  TWO WEEKS    Have you been treated for this issue?  THE PATIENT'S EMERGENCY CONTACT STATES THE PATIENT HAS BEEN TREATED BY DR. BACK FOR THE COUGH AND STATES THAT THE WAS SEEN AT THE [facility] URGENT [facility]ARE ON 5/5/24 AND THAT A CHEST XRAY WAS COMPLETED AS WELL.

## 2024-05-07 ENCOUNTER — PATIENT ROUNDING (BHMG ONLY) (OUTPATIENT)
Dept: URGENT CARE | Facility: CLINIC | Age: 89
End: 2024-05-07
Payer: MEDICARE

## 2024-05-16 ENCOUNTER — OFFICE VISIT (OUTPATIENT)
Dept: INTERNAL MEDICINE | Facility: CLINIC | Age: 89
End: 2024-05-16
Payer: MEDICARE

## 2024-05-16 VITALS
SYSTOLIC BLOOD PRESSURE: 138 MMHG | OXYGEN SATURATION: 94 % | HEIGHT: 69 IN | BODY MASS INDEX: 22.59 KG/M2 | DIASTOLIC BLOOD PRESSURE: 64 MMHG | HEART RATE: 64 BPM | TEMPERATURE: 97.7 F

## 2024-05-16 DIAGNOSIS — R11.10 VOMITING, UNSPECIFIED VOMITING TYPE, UNSPECIFIED WHETHER NAUSEA PRESENT: ICD-10-CM

## 2024-05-16 DIAGNOSIS — E78.5 HYPERLIPIDEMIA, UNSPECIFIED HYPERLIPIDEMIA TYPE: ICD-10-CM

## 2024-05-16 DIAGNOSIS — F03.90 DEMENTIA WITHOUT BEHAVIORAL DISTURBANCE: Chronic | ICD-10-CM

## 2024-05-16 DIAGNOSIS — I10 PRIMARY HYPERTENSION: Primary | Chronic | ICD-10-CM

## 2024-05-16 DIAGNOSIS — E03.9 HYPOTHYROIDISM, UNSPECIFIED TYPE: ICD-10-CM

## 2024-05-16 DIAGNOSIS — L29.9 PRURITUS: ICD-10-CM

## 2024-05-16 DIAGNOSIS — R29.818 PARKINSONIAN FEATURES: Chronic | ICD-10-CM

## 2024-05-16 DIAGNOSIS — R73.03 PREDIABETES: ICD-10-CM

## 2024-05-16 DIAGNOSIS — I87.2 VENOUS INSUFFICIENCY: ICD-10-CM

## 2024-05-16 PROCEDURE — G2211 COMPLEX E/M VISIT ADD ON: HCPCS | Performed by: INTERNAL MEDICINE

## 2024-05-16 PROCEDURE — 1126F AMNT PAIN NOTED NONE PRSNT: CPT | Performed by: INTERNAL MEDICINE

## 2024-05-16 PROCEDURE — 99214 OFFICE O/P EST MOD 30 MIN: CPT | Performed by: INTERNAL MEDICINE

## 2024-05-16 RX ORDER — HYDROXYZINE HYDROCHLORIDE 25 MG/1
25 TABLET, FILM COATED ORAL 3 TIMES DAILY PRN
Qty: 90 TABLET | Refills: 2 | Status: SHIPPED | OUTPATIENT
Start: 2024-05-16

## 2024-05-16 RX ORDER — CETIRIZINE HYDROCHLORIDE 10 MG/1
10 TABLET ORAL DAILY
Qty: 90 TABLET | Refills: 2 | Status: SHIPPED | OUTPATIENT
Start: 2024-05-16

## 2024-05-17 NOTE — PROGRESS NOTES
"      Chief Complaint  Hypertension (6 month follow up /Mwv due after 11/16/2024), Prediabetes, Vomiting (Wife states pt has issues with random violent vomiting. Eyes roll back in head before it happens /Can't tell any pattern or cause /This has been an ongoing issue for the last few years ), and Foot Swelling (White in the morning and purple at night and worsening swelling/Sits a lot does not wear any type of compression sock )    Subjective        Claudio Frausto presents to [facility]    HPI    Patient here for the above problems.  See Assessment and Plan for further HPI components.      Review of Systems    Objective   Vital Signs:  /64 (BP Location: Left arm, Patient Position: Sitting, Cuff Size: Adult)   Pulse 64   Temp 97.7 °F (36.5 °C) (Temporal)   Ht 175.3 cm (69\")   SpO2 94%   BMI 22.59 kg/m²   Estimated body mass index is 22.59 kg/m² as calculated from the following:    Height as of this encounter: 175.3 cm (69\").    Weight as of 5/5/24: 69.4 kg (153 lb).      Physical Exam  Vitals and nursing note reviewed.   Constitutional:       Appearance: He is not ill-appearing.      Comments: Minimal verbal interaction  Diffusely weak   Eyes:      General: No scleral icterus.     Conjunctiva/sclera: Conjunctivae normal.   Pulmonary:      Effort: Pulmonary effort is normal. No respiratory distress.   Neurological:      General: No focal deficit present.      Mental Status: He is alert and oriented to person, place, and time.   Psychiatric:         Mood and Affect: Mood normal.         Behavior: Behavior normal.                     Assessment and Plan   Diagnoses and all orders for this visit:    1. Primary hypertension (Primary)  -     CBC & Differential; Future  -     Comprehensive Metabolic Panel; Future  -     Urinalysis With Microscopic - Urine, Clean Catch; Future    2. Prediabetes  -     Hemoglobin A1c; Future    3. Hypothyroidism, unspecified type  -     TSH Rfx " On Abnormal To Free T4; Future    4. Hyperlipidemia, unspecified hyperlipidemia type  -     Lipid Panel; Future  -     TSH Rfx On Abnormal To Free T4; Future    5. Dementia without behavioral disturbance    6. Parkinsonian features    7. Vomiting, unspecified vomiting type, unspecified whether nausea present    8. Venous insufficiency    Other orders  -     hydrOXYzine (ATARAX) 25 MG tablet; Take 1 tablet by mouth 3 (Three) Times a Day As Needed for Itching. Indications: Feeling Tense  Dispense: 90 tablet; Refill: 2  -     cetirizine (zyrTEC) 10 MG tablet; Take 1 tablet by mouth Daily.  Dispense: 90 tablet; Refill: 2        Patient has venous insufficiency.  This is gravity dependent.  Worse at the end of the day.  Better in AM.  Recommend low salt diet.  Recommend elevation of lower extremities.  Recommend compression stockings as tolerated.  If persists, then we may need to consider secondary causes or other treatment.      Patient has times where he throws up randomly.  Does not seem to follow any pattern.  Often red colored but does not seem bloody.  Do not think worth while to do insvasive workup given advanced age and condition.  Wondering if patient is having post nasal drainage causing issues.  Will trial taking zyrtec daily.      Patient has pruritus.  PRN hydroxyzine.  If we do the additional zyrtec may not need hydroxyzine as much but okay to use PRN.      Labs as above for above conditions.     Patient recently had covid with prolonged cough.  Patient has improved now.  No further issues.  Patient still somewhat weak.  EMS had to help get in the car today.  I had to go assist the patient into the car this afternoon.  Patient dementia and parkinson features stable, but patient significantly weak.    Result Review :           BMI is within normal parameters. No other follow-up for BMI required.      BMI is within normal parameters. No other follow-up for BMI required.            Follow Up   Return in about  6 months (around 11/16/2024), or if symptoms worsen or fail to improve, for longitudinal care, follow up for above problems, Medicare Wellness - Labs prior to visit.  Patient was given instructions and counseling regarding his condition or for health maintenance advice. Please see specific information pulled into the AVS if appropriate.       FORD Franco MD, FACP, [facility]      Electronically signed by Black Franco MD, 05/16/24, 8:27 PM CDT.

## 2024-06-18 ENCOUNTER — TELEPHONE (OUTPATIENT)
Dept: INTERNAL MEDICINE | Facility: CLINIC | Age: 89
End: 2024-06-18
Payer: MEDICARE

## 2024-06-18 NOTE — TELEPHONE ENCOUNTER
Wife called stating husbands nereyda stated he had what she believes to be a pressure sore where his leg a buttock meet.   Offered apt, wife declined state she has no way to get him here today and they are leaving for [place] tomorrow and wont be back until July 8th  Advised with out seeing not much treatment we can do other than him keeping area clean and dry and propping up with a pillow to keep pressure off that particular area. Wife states she bought some Tegaderm to put on area this morning as well   Advise she may want to find a provider to see him in [place] as well

## 2024-06-19 ENCOUNTER — OFFICE VISIT (OUTPATIENT)
Dept: INTERNAL MEDICINE | Facility: CLINIC | Age: 89
End: 2024-06-19
Payer: MEDICARE

## 2024-06-19 VITALS
HEIGHT: 69 IN | DIASTOLIC BLOOD PRESSURE: 70 MMHG | OXYGEN SATURATION: 97 % | SYSTOLIC BLOOD PRESSURE: 136 MMHG | TEMPERATURE: 98.4 F | WEIGHT: 153 LBS | HEART RATE: 62 BPM | BODY MASS INDEX: 22.66 KG/M2

## 2024-06-19 DIAGNOSIS — L29.9 PRURITUS: ICD-10-CM

## 2024-06-19 DIAGNOSIS — L89.326 PRESSURE INJURY OF DEEP TISSUE OF LEFT BUTTOCK: Primary | ICD-10-CM

## 2024-06-19 PROCEDURE — 99213 OFFICE O/P EST LOW 20 MIN: CPT | Performed by: INTERNAL MEDICINE

## 2024-06-19 PROCEDURE — 1126F AMNT PAIN NOTED NONE PRSNT: CPT | Performed by: INTERNAL MEDICINE

## 2024-06-19 NOTE — PROGRESS NOTES
"      Chief Complaint  sore (Left buttock in bend of leg /Noticed Sunday )    Subjective        Claudio Frausto presents to [facility]    HPI    Patient here for the above problems.  See Assessment and Plan for further HPI components.      Review of Systems    Objective   Vital Signs:  /70 (BP Location: Left arm, Patient Position: Sitting, Cuff Size: Adult)   Pulse 62   Temp 98.4 °F (36.9 °C) (Temporal)   Ht 175.3 cm (69\")   Wt 69.4 kg (153 lb)   SpO2 97%   BMI 22.59 kg/m²   Estimated body mass index is 22.59 kg/m² as calculated from the following:    Height as of this encounter: 175.3 cm (69\").    Weight as of this encounter: 69.4 kg (153 lb).      Physical Exam  Vitals and nursing note reviewed.   Constitutional:       Appearance: He is not ill-appearing.      Comments: Non verbal during encounter   Eyes:      General: No scleral icterus.     Conjunctiva/sclera: Conjunctivae normal.   Pulmonary:      Effort: Pulmonary effort is normal. No respiratory distress.   Neurological:      Mental Status: He is alert and oriented to person, place, and time. Mental status is at baseline.   Psychiatric:         Mood and Affect: Mood normal.         Behavior: Behavior normal.                         Assessment and Plan   Diagnoses and all orders for this visit:    1. Pressure injury of deep tissue of left buttock (Primary)    2. Pruritus        Try hydroxyzine closer to bedtime.   Continue moisturizer    For wound, recommend ABD pads and offloading pressure  Consider wandas butt paste or therahoney or medihoney.     Result Review :           BMI is within normal parameters. No other follow-up for BMI required.      BMI is within normal parameters. No other follow-up for BMI required.            Follow Up   Return in about 5 months (around 11/19/2024) for follow up for above problems. Longitudinal care., Next scheduled follow up.  Patient was given instructions and counseling " regarding his condition or for health maintenance advice. Please see specific information pulled into the AVS if appropriate.       FORD Franco MD, FACP, FHM      Electronically signed by Black Franco MD, 06/19/24, 6:13 PM CDT.

## 2024-07-25 ENCOUNTER — TELEPHONE (OUTPATIENT)
Dept: INTERNAL MEDICINE | Facility: CLINIC | Age: 89
End: 2024-07-25

## 2024-07-25 NOTE — TELEPHONE ENCOUNTER
Caller: Tricia Frausto    Relationship: Emergency Contact    Best call back number: [phone]     What orders are you requesting (i.e. lab or imaging): HOME HEALTH CARE WITH HOSPICE ABDIAZIZ     In what timeframe would the patient need to come in: ASAP     Where will you receive your lab/imaging services: AT HOME     Additional notes: PATIENT'S WIFE STATED SHE TALKED TO SOMEONE AT HOSPICE ABOUT GETTING HELP AT HOME WITH PATIENT. THEY TOLD HER THEY SHOULD BE ABLE TO HELP IF DR BACK PUT THE ORDER IN

## 2024-07-29 ENCOUNTER — TELEPHONE (OUTPATIENT)
Dept: INTERNAL MEDICINE | Facility: CLINIC | Age: 89
End: 2024-07-29

## 2024-07-29 NOTE — TELEPHONE ENCOUNTER
Talked with wife to try to clarify their needs - she says she talked with Hospice and they mentioned volunteers for sitter services and help with medicine.  Asked her about his wound and she says it was doing better, but then opened back up and is bleeding.  She has been putting Conifer honey on it.  She says he has not been out of the house since July 8th.  Discussed the role of home health vs Hospice and end of life etc.  She says she is not sure what she needs.  Told her we could try starting with a home health referral and then he can always transition to Hospice when/if needed.  She is agreeable.  He will need to have a visit within 30 days of the referral, and he is just outside the 30 day window, so will contact home health and see if a video visit would suffice (if he has mychart).

## 2024-07-29 NOTE — TELEPHONE ENCOUNTER
Patient's wife called and would like a call back regarding care for her . Call back # [phone]

## 2024-08-01 ENCOUNTER — TELEMEDICINE (OUTPATIENT)
Dept: INTERNAL MEDICINE | Facility: CLINIC | Age: 89
End: 2024-08-01
Payer: MEDICARE

## 2024-08-01 ENCOUNTER — HOME HEALTH ADMISSION (OUTPATIENT)
Dept: HOME HEALTH SERVICES | Facility: HOME HEALTHCARE | Age: 89
End: 2024-08-01
Payer: COMMERCIAL

## 2024-08-01 DIAGNOSIS — L89.326 PRESSURE INJURY OF DEEP TISSUE OF LEFT BUTTOCK: Primary | ICD-10-CM

## 2024-08-01 DIAGNOSIS — R53.1 GENERALIZED WEAKNESS: ICD-10-CM

## 2024-08-01 DIAGNOSIS — Z78.9 IMPAIRED MOBILITY AND ACTIVITIES OF DAILY LIVING: ICD-10-CM

## 2024-08-01 DIAGNOSIS — Z74.09 IMPAIRED MOBILITY AND ACTIVITIES OF DAILY LIVING: ICD-10-CM

## 2024-08-01 PROCEDURE — 1126F AMNT PAIN NOTED NONE PRSNT: CPT

## 2024-08-01 PROCEDURE — 99441 PR PHYS/QHP TELEPHONE EVALUATION 5-10 MIN: CPT

## 2024-08-01 PROCEDURE — 1159F MED LIST DOCD IN RCRD: CPT

## 2024-08-01 PROCEDURE — 1160F RVW MEDS BY RX/DR IN RCRD: CPT

## 2024-08-01 NOTE — PROGRESS NOTES
Subjective     You have chosen to receive care through a telehealth visit.  Do you consent to use a video/audio connection for your medical care today? Yes     Patient location: [address] [place] [address]    Provider location: [address] [place] [address]    Video visit was attempted but connection was poor so the visit had to be completed via telephone.    Chief Complaint:  Discuss home health    HPI:  Patient presents today via video visit to discuss home health.  Please see assessment and plan below.    Past Medical History:   Past Medical History:   Diagnosis Date    Alzheimer disease     BCC (basal cell carcinoma)     right ear    Cancer     skin    Hypertension     Short-term memory loss     Trigger finger     Trigger finger, left middle finger 12/21/2016    Wears dentures     Wears glasses      Past Surgical History:  Past Surgical History:   Procedure Laterality Date    LAPAROSCOPIC CHOLECYSTECTOMY      TRIGGER FINGER RELEASE Left     and on right hand    TRIGGER FINGER RELEASE Left 5/12/2017    Procedure: LEFT RING FINGER TRIGGER RELEASE;  Surgeon: Tyler Godwin MD;  Location: [facility] OR;  Service:        Allergies:  Allergies   Allergen Reactions    Ace Inhibitors Rash    Midazolam Rash     Patient already has memory issues and does not want versed.    Penicillins Rash     Medications:  Prior to Admission medications    Medication Sig Start Date End Date Taking? Authorizing Provider   amLODIPine (NORVASC) 5 MG tablet TAKE 1 TABLET BY MOUTH DAILY. 2/8/24   Black Franco MD   carbidopa-levodopa (SINEMET)  MG per tablet Take 2 tablets by mouth 3 (Three) Times a Day. 2 in am, 1 at lunch and 2 in pm, will increase to 2 tablets TID 11/20/23.  Indications: Parkinson's Disease 10/24/22   Provider, MD Elizabeth   cetirizine (zyrTEC) 10 MG tablet Take 1 tablet by mouth Daily. 5/16/24   Black Franco MD   hydrOXYzine (ATARAX) 25 MG tablet Take 1 tablet by mouth 3 (Three)  Times a Day As Needed for Itching. Indications: Feeling Tense 5/16/24   Black Franco MD       Objective     Vital Signs: There were no vitals taken for this visit.  Physical Exam  Unable to perform physical exam via video visit.  Patient is lying in bed.    Results Reviewed:  None relevant.    Assessment / Plan     Assessment/Plan:  Diagnoses and all orders for this visit:    1. Pressure injury of deep tissue of left buttock (Primary)  -     Ambulatory Referral to Home Health    2. Generalized weakness  -     Ambulatory Referral to Home Health    3. Impaired mobility and activities of daily living  -     Ambulatory Referral to Home Health       Patient presents today via telephone visit and is accompanied by his spouse.  His spouse, Tricia, called the office on 7/25 stating that she needed some assistance through home health or hospice.  They have ultimately elected to proceed with home health for wound care for pressure injury of the left buttock.  She states that he has not been willing to participate with PT/OT in the past and would likely not cooperate if this was ordered.  We will proceed home health nursing services for wound care.  If the patient continues to decline we may need to have a further discussion regarding hospice.  The patient's spouse expressed understanding and is agreeable to plan of care.    Return for Next scheduled follow up. unless patient needs to be seen sooner or acute issues arise.    I have discussed the patient results/orders and and plan/recommendation with them at today's visit.      Approximately 10 minutes spent with the patient.    Patria Conn, JENNIFFER   08/01/2024

## 2024-08-02 ENCOUNTER — TELEPHONE (OUTPATIENT)
Dept: INTERNAL MEDICINE | Facility: CLINIC | Age: 89
End: 2024-08-02

## 2024-08-02 NOTE — TELEPHONE ENCOUNTER
Called and spoke wife- advised due to insurance rules we have to have a face to face visit- the failed video visit that turned into a telephone call will not suffice.  Offered wife to try another video visit- she decided to bring him in on 8/7/24

## 2024-08-02 NOTE — TELEPHONE ENCOUNTER
Hub staff attempted to follow warm transfer process and was unsuccessful     Caller: Tavon Frausto  [facility]    Relationship to patient: Emergency Contact    Best call back number:     [phone] (Mobile)       Patient is needing: THE PATIENT'S EMERGENCY CONTACT STATES SHE IS RETURNING A PHONE CALL TO ROD.

## 2024-08-07 ENCOUNTER — OFFICE VISIT (OUTPATIENT)
Dept: INTERNAL MEDICINE | Facility: CLINIC | Age: 89
End: 2024-08-07
Payer: MEDICARE

## 2024-08-07 VITALS
HEART RATE: 56 BPM | SYSTOLIC BLOOD PRESSURE: 132 MMHG | HEIGHT: 69 IN | DIASTOLIC BLOOD PRESSURE: 68 MMHG | TEMPERATURE: 97.8 F | BODY MASS INDEX: 23.52 KG/M2 | OXYGEN SATURATION: 97 % | WEIGHT: 158.8 LBS

## 2024-08-07 DIAGNOSIS — F03.90 DEMENTIA WITHOUT BEHAVIORAL DISTURBANCE: Primary | Chronic | ICD-10-CM

## 2024-08-07 DIAGNOSIS — R29.818 PARKINSONIAN FEATURES: Chronic | ICD-10-CM

## 2024-08-07 DIAGNOSIS — I10 PRIMARY HYPERTENSION: Chronic | ICD-10-CM

## 2024-08-07 DIAGNOSIS — L89.326 PRESSURE INJURY OF DEEP TISSUE OF LEFT BUTTOCK: ICD-10-CM

## 2024-08-07 PROCEDURE — 99214 OFFICE O/P EST MOD 30 MIN: CPT | Performed by: INTERNAL MEDICINE

## 2024-08-07 PROCEDURE — 1126F AMNT PAIN NOTED NONE PRSNT: CPT | Performed by: INTERNAL MEDICINE

## 2024-08-07 PROCEDURE — G2211 COMPLEX E/M VISIT ADD ON: HCPCS | Performed by: INTERNAL MEDICINE

## 2024-08-08 ENCOUNTER — REFERRAL TRIAGE (OUTPATIENT)
Dept: CASE MANAGEMENT | Facility: OTHER | Age: 89
End: 2024-08-08
Payer: MEDICARE

## 2024-08-11 NOTE — PROGRESS NOTES
"      Chief Complaint  Home health (Would like to start with home health, would like to go through [facility] if possible.//Pressure wound L leg under his buttocks, used wound honey and dressings, thinks it has healed.) and Medication (Wants to know if you will start prescribing the carbidopa-levodopa.)    Subjective        Claudio Frausto presents to [facility]IMARY [facility]    HPI    Patient here for the above problems.  See Assessment and Plan for further HPI components.      Review of Systems    Objective   Vital Signs:  /68 (BP Location: Left arm, Patient Position: Sitting, Cuff Size: Adult)   Pulse 56   Temp 97.8 °F (36.6 °C) (Temporal)   Ht 175.3 cm (69.02\")   Wt 72 kg (158 lb 12.8 oz)   SpO2 97%   BMI 23.44 kg/m²   Estimated body mass index is 23.44 kg/m² as calculated from the following:    Height as of this encounter: 175.3 cm (69.02\").    Weight as of this encounter: 72 kg (158 lb 12.8 oz).      Physical Exam  Vitals and nursing note reviewed.   Constitutional:       Appearance: He is not ill-appearing.   Eyes:      General: No scleral icterus.     Conjunctiva/sclera: Conjunctivae normal.   Pulmonary:      Effort: Pulmonary effort is normal. No respiratory distress.   Neurological:      General: No focal deficit present.      Mental Status: He is alert and oriented to person, place, and time. Mental status is at baseline. He is disoriented.      Comments: Baseline  Masked facies  rigid   Psychiatric:         Mood and Affect: Mood normal.         Behavior: Behavior normal.                       Assessment and Plan   Diagnoses and all orders for this visit:    1. Dementia without behavioral disturbance (Primary)  -     Ambulatory Referral to Case Management Care Coordination    2. Parkinsonian features  -     Ambulatory Referral to Case Management Care Coordination    3. Primary hypertension    4. Pressure injury of deep tissue of left buttock    Other orders  -     " carbidopa-levodopa (SINEMET)  MG per tablet; Take 2 tablets by mouth 3 (Three) Times a Day. Indications: Parkinson's Disease  Dispense: 180 tablet; Refill: 5      Pressure injury healing.  Still present.  Continue pressure off loading.  Continue therahoney.    Patient has dementia at baseline.  Patient unable to perform ADLs.  The patient needs asstance with most activities.  His wife has some sitters that come help but may need some more help.  Will put in social work referral.      Patient has hypertension.  BP is at goal.  The patient's BP goal is < 140/90  Recommend ambulatory BP monitoring after patient has taken medication.  Recommend varying the times of the blood pressure readings.  Patient is currently on amlodpine 5 mg.  Recommend we continue current regimen.    Continue to monitor.    Patient has Parkisonian features.  The patient on sinemet.  They asked if I would take over prescribing to avoid having any other doctors to go to since so hard for him to get in and out.  I think medication is appropriate.  Will send in new script for this.           Result Review :           BMI is within normal parameters. No other follow-up for BMI required.      BMI is within normal parameters. No other follow-up for BMI required.            Follow Up   Return in about 3 months (around 11/7/2024), or if symptoms worsen or fail to improve, for follow up for above problems. [facility] care., Medicare Wellness - Labs prior to visit.  Patient was given instructions and counseling regarding his condition or for health maintenance advice. Please see specific information pulled into the AVS if appropriate.       FORD Franco MD, FACP, [facility]      Electronically signed by Black Franco MD, 08/11/24, 4:23 PM CDT.          Answers submitted by the patient for this visit:  Primary Reason for Visit (Submitted on 8/3/2024)  What is the primary reason for your visit?: Other  Other (Submitted on 8/3/2024)  Please  describe your symptoms.: Pressure wound  Have you had these symptoms before?: Yes  How long have you been having these symptoms?: 1-2 weeks

## 2024-08-12 ENCOUNTER — PATIENT OUTREACH (OUTPATIENT)
Dept: CASE MANAGEMENT | Facility: OTHER | Age: 89
End: 2024-08-12
Payer: MEDICARE

## 2024-08-12 NOTE — OUTREACH NOTE
AMBULATORY CASE MANAGEMENT NOTE    Names and Relationships of Patient/Support Persons: Contact: Tricia Frausto; Relationship: Emergency Contact -     Patient Outreach    Spoke with Patient's wife to introduce CM services. I will help her from a nursing standpoint and refer her to the  for additional help at home. I will monitor the level of assistance she get from  for the pressure injury and his BP.     Education Documentation  No documentation found.        Conchis BARBOSA  Ambulatory Case Management    8/12/2024, 14:00 CDT

## 2024-08-14 ENCOUNTER — PATIENT OUTREACH (OUTPATIENT)
Age: 89
End: 2024-08-14
Payer: MEDICARE

## 2024-08-14 NOTE — OUTREACH NOTE
Social Work Assessment  Questions/Answers      Flowsheet Row Most Recent Value   Referral Source outpatient staff, outpatient clinic, nursing   Reason for Consult community resources   Preferred Language English   Advance Care Planning Reviewed no concerns identified   Decision Making Considerations patient/family ability to make health care decisions, patient/family management of healthcare needs, patient/ for healthcare needs, patient/family capacity to make sound judgments   People in Home spouse   Current Living Arrangements home   Potentially Unsafe Housing Conditions none   In the past 12 months has the electric, gas, oil, or water company threatened to shut off services in your home? No   Primary Care Provided by spouse/significant other   Provides Primary Care For no one, unable/limited ability to care for self   Family Caregiver if Needed spouse, friend(s)   Quality of Family Relationships helpful, involved   Able to Return to Prior Arrangements yes   Employment Status retired   Source of Income social security   Application for Public Assistance pending public assistance pending number   Usual Activity Tolerance poor   Current Activity Tolerance poor   Medications assistive person   Meal Preparation assistive person   Housekeeping assistive person   Laundry assistive person   Shopping completely dependent          SDOH updated and reviewed with the patient during this program:  Financial Resource Strain: Low Risk  (8/14/2024)    Overall Financial Resource Strain (CARDIA)     Difficulty of Paying Living Expenses: Not very hard      --     Food Insecurity: No Food Insecurity (8/14/2024)    Hunger Vital Sign     Worried About Running Out of Food in the Last Year: Never true     Ran Out of Food in the Last Year: Never true      --     Housing Stability: Not At Risk (8/14/2024)    Housing Stability     Current Living Arrangements: home     Potentially Unsafe Housing Conditions: none      --      Transportation Needs: No Transportation Needs (8/14/2024)    PRAPARE - Transportation     Lack of Transportation (Medical): No     Lack of Transportation (Non-Medical): No         Patient Outreach    SW received referral via RN-ACM re: in home care needs. SW called and spoke to patient spouse. Patient is currently home bound and requiring additional assistance with ADL's. Spouse currently private pays for care givers 2x a week. Interested in additional support. SW provided education on Medicaid Home and Community Based Waiver services. Spouse agreeable to information to review. Information to be sent to patient spouse e-mail address listed in demographics.     Gabby MONIQUE -   Ambulatory Case Management    8/14/2024, 16:15 EDT

## 2024-08-15 ENCOUNTER — HOME CARE VISIT (OUTPATIENT)
Dept: HOME HEALTH SERVICES | Facility: CLINIC | Age: 89
End: 2024-08-15
Payer: COMMERCIAL

## 2024-08-15 PROCEDURE — G0299 HHS/HOSPICE OF RN EA 15 MIN: HCPCS

## 2024-08-19 VITALS
SYSTOLIC BLOOD PRESSURE: 112 MMHG | DIASTOLIC BLOOD PRESSURE: 68 MMHG | OXYGEN SATURATION: 97 % | RESPIRATION RATE: 18 BRPM | HEART RATE: 64 BPM | TEMPERATURE: 98.1 F

## 2024-08-22 ENCOUNTER — HOME CARE VISIT (OUTPATIENT)
Dept: HOME HEALTH SERVICES | Facility: CLINIC | Age: 89
End: 2024-08-22
Payer: COMMERCIAL

## 2024-08-22 VITALS
OXYGEN SATURATION: 98 % | RESPIRATION RATE: 16 BRPM | HEART RATE: 59 BPM | SYSTOLIC BLOOD PRESSURE: 118 MMHG | TEMPERATURE: 98.6 F | DIASTOLIC BLOOD PRESSURE: 64 MMHG

## 2024-08-22 PROCEDURE — G0300 HHS/HOSPICE OF LPN EA 15 MIN: HCPCS

## 2024-08-26 ENCOUNTER — HOME CARE VISIT (OUTPATIENT)
Dept: HOME HEALTH SERVICES | Facility: CLINIC | Age: 89
End: 2024-08-26
Payer: COMMERCIAL

## 2024-08-26 VITALS — TEMPERATURE: 97.5 F | RESPIRATION RATE: 16 BRPM | DIASTOLIC BLOOD PRESSURE: 70 MMHG | SYSTOLIC BLOOD PRESSURE: 120 MMHG

## 2024-08-26 PROCEDURE — G0152 HHCP-SERV OF OT,EA 15 MIN: HCPCS

## 2024-08-26 NOTE — HOME HEALTH
SUBJECTIVE: Family reports patient is dependent for ADLS and has been for awhile. Spouse gives patient sponge bath while seated on BSC. Spouse reports primary concern in walking and transfers in/out of car. Caregiver reports they have jonas to get him from the floor when he  falls.   DX: Pressure-induced deep tissue damage of left buttock   PRECAUTIONS: fall  OXYGEN USE: no  EQUIPMENT: walker, BSC, w/c and transport chair  PRIOR LEVEL OF FUNCTION: lives with spouse, dependent with ADLs  MEDICAL NECESSITY: OT to assess AE needs and safety with ADL routines    DATE OF NEXT APPOINTMENT WITH DOCTOR:  to be scheduled  ANTICIPATED DISCHARGE PLAN: to family care  PATIENT/CAREGIVER AGREE WITH DISCHARGE PLAN: yes  SPECIFIC INTERVENTIONS AND GOALS TO ADDRESS ON NEXT VISIT: N/A  FREQUENCY AND DURATION: eval only       PATIENT HAS RECEIVED EDUCATION ON COVID-19, PREVENTION, HANDWASHING, SOCIAL DISTANCING PER CDC

## 2024-08-27 ENCOUNTER — HOME CARE VISIT (OUTPATIENT)
Dept: HOME HEALTH SERVICES | Facility: CLINIC | Age: 89
End: 2024-08-27
Payer: COMMERCIAL

## 2024-08-27 VITALS
SYSTOLIC BLOOD PRESSURE: 118 MMHG | DIASTOLIC BLOOD PRESSURE: 74 MMHG | RESPIRATION RATE: 18 BRPM | TEMPERATURE: 97.6 F | HEART RATE: 64 BPM | OXYGEN SATURATION: 98 %

## 2024-08-27 PROCEDURE — G0151 HHCP-SERV OF PT,EA 15 MIN: HCPCS

## 2024-08-27 NOTE — HOME HEALTH
Reason for Hosp/Primary Dx : 87 yo male Primary Dx: Pressure-induced deep tissue damage of left buttock . Pt / aide report steady decline in strength and mobility   referral orders : Primary Dx: Pressure-induced deep tissue damage of left buttock    Focus of Care: deconditioning, bed mobility , transfers, gait, safety per Primary Dx: Pressure-induced deep tissue damage of left buttock    Current Functional status/mobility/DME : See DME    HB status/Living Arrangements:   pt requires A for mobility and pt safety   pt lives with family - ramp     Skin Integrity/wound status: na    Code Status: FULL CODE    Fall Risk: high risk    PmHx:  Parkinsons - dementia     PLOF : household amb with A  - community prn with A

## 2024-08-28 ENCOUNTER — PATIENT OUTREACH (OUTPATIENT)
Dept: CASE MANAGEMENT | Facility: OTHER | Age: 89
End: 2024-08-28
Payer: MEDICARE

## 2024-08-28 NOTE — OUTREACH NOTE
AMBULATORY CASE MANAGEMENT NOTE    Names and Relationships of Patient/Support Persons: Contact: Tricia Frausto; Relationship: Emergency Contact -     Patient Outreach    Spoke with spouse and patient continues with [facility] PT. The buttock wound has healed per spouse. Spouse is expecting a [facility] MSW visit tomorrow and has been in communication with the state about additional resources in the home.         Purvi WOOD  Ambulatory Case Management    8/28/2024, 12:59 CDT

## 2024-08-29 ENCOUNTER — HOME CARE VISIT (OUTPATIENT)
Dept: HOME HEALTH SERVICES | Facility: CLINIC | Age: 89
End: 2024-08-29
Payer: COMMERCIAL

## 2024-08-29 VITALS
HEART RATE: 53 BPM | SYSTOLIC BLOOD PRESSURE: 104 MMHG | DIASTOLIC BLOOD PRESSURE: 56 MMHG | TEMPERATURE: 98.4 F | OXYGEN SATURATION: 93 %

## 2024-08-29 PROCEDURE — G0157 HHC PT ASSISTANT EA 15: HCPCS

## 2024-08-29 PROCEDURE — G0155 HHCP-SVS OF CSW,EA 15 MIN: HCPCS

## 2024-08-30 ENCOUNTER — HOME CARE VISIT (OUTPATIENT)
Dept: HOME HEALTH SERVICES | Facility: CLINIC | Age: 89
End: 2024-08-30
Payer: COMMERCIAL

## 2024-08-31 NOTE — HOME HEALTH
Patient missed an SN visit from [facility] on 8/30/24    Reason: Spouse refused any further SN visits      For your records only.   As per home health protocol, MD must be notified of missed/cancelled visits; therefore the prescribed frequency was not met.

## 2024-09-04 ENCOUNTER — HOME CARE VISIT (OUTPATIENT)
Dept: HOME HEALTH SERVICES | Facility: CLINIC | Age: 89
End: 2024-09-04
Payer: COMMERCIAL

## 2024-09-04 VITALS
RESPIRATION RATE: 16 BRPM | HEART RATE: 60 BPM | SYSTOLIC BLOOD PRESSURE: 100 MMHG | DIASTOLIC BLOOD PRESSURE: 70 MMHG | TEMPERATURE: 98.4 F

## 2024-09-04 PROCEDURE — G0157 HHC PT ASSISTANT EA 15: HCPCS

## 2024-09-04 NOTE — CASE COMMUNICATION
I had to move Jett to Wednesday from Tuesday therefore Lopez has to see him for [facility] on Friday.  I have already broke the news to lopez and he was ok.

## 2024-09-06 ENCOUNTER — HOME CARE VISIT (OUTPATIENT)
Dept: HOME HEALTH SERVICES | Facility: CLINIC | Age: 89
End: 2024-09-06
Payer: COMMERCIAL

## 2024-09-06 PROCEDURE — G0151 HHCP-SERV OF PT,EA 15 MIN: HCPCS

## 2024-09-08 NOTE — HOME HEALTH
pt / family agrees to  PT dc with AD use, HEP and family A prn   pt reports compliance HEP and AD use   pt declines need for outpt rehab services     Reason for Hosp/Primary Dx : 87 yo male Primary Dx: Pressure-induced deep tissue damage of left buttock . Pt / aide report steady decline in strength and mobility   PmHx: Parkinsons - dementia   PLOF : household amb with A - community prn with A

## 2024-09-17 ENCOUNTER — PATIENT OUTREACH (OUTPATIENT)
Dept: CASE MANAGEMENT | Facility: OTHER | Age: 89
End: 2024-09-17
Payer: MEDICARE

## 2024-10-01 ENCOUNTER — TELEPHONE (OUTPATIENT)
Dept: INTERNAL MEDICINE | Facility: CLINIC | Age: 89
End: 2024-10-01

## 2024-10-01 RX ORDER — AMLODIPINE BESYLATE 5 MG/1
5 TABLET ORAL DAILY
Qty: 90 TABLET | Refills: 3 | Status: SHIPPED | OUTPATIENT
Start: 2024-11-06

## 2024-10-01 RX ORDER — HYDROXYZINE HYDROCHLORIDE 25 MG/1
25 TABLET, FILM COATED ORAL 3 TIMES DAILY PRN
Qty: 90 TABLET | Refills: 2 | Status: SHIPPED | OUTPATIENT
Start: 2024-11-27

## 2024-10-01 NOTE — TELEPHONE ENCOUNTER
Caller: Tricia Frausto    Relationship: Emergency Contact-  [employer]    Best call back number:     [phone] (Mobile)       Who are you requesting to speak with (clinical staff, provider,  specific staff member): CLINICAL    What was the call regarding: THE PATIENT'S EMERGENCY CONTACT STATES THAT SHE WOULD LIKE A CALLBACK TO BRING THE PATIENT IN FOR COVID VACCINE ON A WEDNESDAY AS WELL AS THE FLU VACCINE WHEN IT IS IN STOCK.

## 2024-11-08 NOTE — DISCHARGE INSTRUCTIONS
Jerrica Nice    Lobito Weinberg MD;  Location:  ClickToShop [address] [place] [address]  PATIENT EDUCATION  Lumbar Puncture Discharge Instructions  Care Needed at Home:  · Resume regular diet  · Rest in bed or recliner for the next 12 hours, do not elevate head more than 30 degrees. · You may remove bandage in 24 hours if no bleeding, keep site clean and dry. · You may shower the next day. · Be sure to wash your hands before touching the wound or dressing. · Drink at least 8-10 ounces of nonalcoholic fluid every hour. · Do not drive until morning. · No strenuous activity for 24 hours. · Very important: No heavy lifting or bending for the remainder of the day. · May resume Coumadin (warfarin) or Plavix in 4 days. What Problems Could Happen? · Headache  · Upset stomach or throwing up  · Damage to nerves and vessels  · Infection  When Should I Call the Doctor? · Signs of infection: fever of 100.4 or higher, chills or non-healing wound. · Signs of a wound infection: swelling, redness, warmth around the wound; too much pain when  touched; yellowish, greenish or bloody discharge; foul smell coming from the cut site; cute site  opens up. · Numbness, weakness and/or tingling of the legs and/or feet or inability to urinate.   · Stiff neck  · A headache that last longer than 24 hours or becomes intolerable, call your doctor.  _____________________________________________________________________________________  Signature of Patient or Responsible Person Date Time  _____________________________________________________________________________________  Signature of Instructing Nurse Date Time [id number]                                  Final result               Lavender Top[[id number]]                                     Final result               Gold Top - SST[[id number]]                                   Final result               Toth Top[[id number]]   "                                       Final result               Light Blue Top[[id number]]                                   

## 2024-12-19 ENCOUNTER — TELEPHONE (OUTPATIENT)
Dept: INTERNAL MEDICINE | Facility: CLINIC | Age: 89
End: 2024-12-19
Payer: MEDICARE

## 2024-12-19 RX ORDER — AZITHROMYCIN 250 MG/1
TABLET, FILM COATED ORAL
Qty: 6 TABLET | Refills: 0 | Status: SHIPPED | OUTPATIENT
Start: 2024-12-19

## 2024-12-19 RX ORDER — METHYLPREDNISOLONE 4 MG/1
TABLET ORAL
Qty: 21 TABLET | Refills: 0 | Status: SHIPPED | OUTPATIENT
Start: 2024-12-19

## 2024-12-19 NOTE — TELEPHONE ENCOUNTER
Patient wife called. Dr bejarano called in steroids and z pack for wife symptoms, its helping but now he is having same symptoms, she is requesting same meds be sent to [facility] for him.

## 2024-12-31 ENCOUNTER — TELEPHONE (OUTPATIENT)
Dept: INTERNAL MEDICINE | Facility: CLINIC | Age: 89
End: 2024-12-31

## 2024-12-31 NOTE — TELEPHONE ENCOUNTER
Caller: Tricia Frausto     Relationship: [employer]    Best call back number:     [phone] (Mobile), REQUESTING AC CALLBACK.        What is your medical concern? THE PATIENT'S EMERGENCY CONTACT STATES THAT SHE THINKS THE PATIENT HAS A URINARY TRACT INFECTION. THE PATIENTS EMERGENCY CONTACT STATES THE PATIENT'S URINE HAS AN ODOR.     How long has this issue been going on? 12/27/24    THE PATIENT'S EMERGENCY CONTACT STATES THAT SHE WOULD LIKE TO STOP BY AN GET A URINE CUP FOR A URINE SAMPLE.  UNABLE TO TRANSFER THE CALL.

## 2025-01-02 ENCOUNTER — CLINICAL SUPPORT (OUTPATIENT)
Dept: INTERNAL MEDICINE | Facility: CLINIC | Age: 89
End: 2025-01-02
Payer: MEDICARE

## 2025-01-02 DIAGNOSIS — R39.9 UTI SYMPTOMS: Primary | ICD-10-CM

## 2025-01-02 LAB
BILIRUB BLD-MCNC: NEGATIVE MG/DL
CLARITY, POC: ABNORMAL
COLOR UR: ABNORMAL
EXPIRATION DATE: ABNORMAL
GLUCOSE UR STRIP-MCNC: NEGATIVE MG/DL
KETONES UR QL: NEGATIVE
LEUKOCYTE EST, POC: NEGATIVE
Lab: ABNORMAL
NITRITE UR-MCNC: NEGATIVE MG/ML
PH UR: 7 [PH] (ref 5–8)
PROT UR STRIP-MCNC: ABNORMAL MG/DL
RBC # UR STRIP: ABNORMAL /UL
SP GR UR: 1.02 (ref 1–1.03)
UROBILINOGEN UR QL: ABNORMAL

## 2025-01-02 PROCEDURE — 81003 URINALYSIS AUTO W/O SCOPE: CPT | Performed by: INTERNAL MEDICINE

## 2025-01-02 RX ORDER — CEFDINIR 300 MG/1
300 CAPSULE ORAL 2 TIMES DAILY
Qty: 14 CAPSULE | Refills: 0 | Status: SHIPPED | OUTPATIENT
Start: 2025-01-02

## 2025-01-06 LAB
BACTERIA UR CULT: ABNORMAL
OTHER ANTIBIOTIC SUSC ISLT: ABNORMAL

## 2025-01-06 RX ORDER — CIPROFLOXACIN 500 MG/1
500 TABLET, FILM COATED ORAL 2 TIMES DAILY
Qty: 10 TABLET | Refills: 0 | Status: SHIPPED | OUTPATIENT
Start: 2025-01-06

## 2025-01-06 RX ORDER — HYDROXYZINE HYDROCHLORIDE 25 MG/1
25 TABLET, FILM COATED ORAL 3 TIMES DAILY PRN
Qty: 90 TABLET | Refills: 2 | Status: SHIPPED | OUTPATIENT
Start: 2025-01-06

## 2025-01-20 ENCOUNTER — OFFICE VISIT (OUTPATIENT)
Dept: INTERNAL MEDICINE | Facility: CLINIC | Age: 89
End: 2025-01-20
Payer: MEDICARE

## 2025-01-20 VITALS
HEIGHT: 69 IN | HEART RATE: 56 BPM | OXYGEN SATURATION: 98 % | BODY MASS INDEX: 23.4 KG/M2 | DIASTOLIC BLOOD PRESSURE: 60 MMHG | SYSTOLIC BLOOD PRESSURE: 110 MMHG | TEMPERATURE: 97.8 F | WEIGHT: 158 LBS

## 2025-01-20 DIAGNOSIS — Z78.9 IMPAIRED MOBILITY AND ACTIVITIES OF DAILY LIVING: ICD-10-CM

## 2025-01-20 DIAGNOSIS — Z74.09 IMPAIRED MOBILITY AND ACTIVITIES OF DAILY LIVING: ICD-10-CM

## 2025-01-20 DIAGNOSIS — Z23 FLU VACCINE NEED: Primary | ICD-10-CM

## 2025-01-20 DIAGNOSIS — Z23 NEED FOR COVID-19 VACCINE: ICD-10-CM

## 2025-01-20 DIAGNOSIS — F03.90 DEMENTIA WITHOUT BEHAVIORAL DISTURBANCE: Chronic | ICD-10-CM

## 2025-01-20 DIAGNOSIS — R11.10 VOMITING, UNSPECIFIED VOMITING TYPE, UNSPECIFIED WHETHER NAUSEA PRESENT: ICD-10-CM

## 2025-01-20 DIAGNOSIS — I10 PRIMARY HYPERTENSION: ICD-10-CM

## 2025-01-20 DIAGNOSIS — L89.326 PRESSURE INJURY OF DEEP TISSUE OF LEFT BUTTOCK: ICD-10-CM

## 2025-01-20 DIAGNOSIS — R29.818 PARKINSONIAN FEATURES: Chronic | ICD-10-CM

## 2025-01-20 PROCEDURE — 90662 IIV NO PRSV INCREASED AG IM: CPT | Performed by: INTERNAL MEDICINE

## 2025-01-20 PROCEDURE — 1170F FXNL STATUS ASSESSED: CPT | Performed by: INTERNAL MEDICINE

## 2025-01-20 PROCEDURE — G0008 ADMIN INFLUENZA VIRUS VAC: HCPCS | Performed by: INTERNAL MEDICINE

## 2025-01-20 PROCEDURE — 1126F AMNT PAIN NOTED NONE PRSNT: CPT | Performed by: INTERNAL MEDICINE

## 2025-01-20 PROCEDURE — 1160F RVW MEDS BY RX/DR IN RCRD: CPT | Performed by: INTERNAL MEDICINE

## 2025-01-20 PROCEDURE — 91320 SARSCV2 VAC 30MCG TRS-SUC IM: CPT | Performed by: INTERNAL MEDICINE

## 2025-01-20 PROCEDURE — 90480 ADMN SARSCOV2 VAC 1/ONLY CMP: CPT | Performed by: INTERNAL MEDICINE

## 2025-01-20 PROCEDURE — 1159F MED LIST DOCD IN RCRD: CPT | Performed by: INTERNAL MEDICINE

## 2025-01-20 PROCEDURE — G2211 COMPLEX E/M VISIT ADD ON: HCPCS | Performed by: INTERNAL MEDICINE

## 2025-01-20 PROCEDURE — G0439 PPPS, SUBSEQ VISIT: HCPCS | Performed by: INTERNAL MEDICINE

## 2025-01-20 NOTE — PROGRESS NOTES
Subjective   The ABCs of the Annual Wellness Visit  Medicare Wellness Visit      Claudio Frausto is a 89 y.o. patient who presents for a Medicare Wellness Visit.    The following portions of the patient's history were reviewed and   updated as appropriate: allergies, current medications, past family history, past medical history, past social history, past surgical history, and problem list.    Compared to one year ago, the patient's physical   health is worse.  Compared to one year ago, the patient's mental   health is the same.    Recent Hospitalizations:  He was not admitted to the hospital during the last year.     Current Medical Providers:  Patient Care Team:  Black Franco MD as PCP - General (Internal Medicine)  Rose Pascual MD as Consulting Physician (Dermatology)    Outpatient Medications Prior to Visit   Medication Sig Dispense Refill    amLODIPine (NORVASC) 5 MG tablet Take 1 tablet by mouth Daily. Indications: High Blood Pressure 90 tablet 3    carbidopa-levodopa (SINEMET)  MG per tablet Take 2 tablets by mouth 3 (Three) Times a Day. Indications: Parkinson's Disease 180 tablet 5    cetirizine (zyrTEC) 10 MG tablet Take 1 tablet by mouth Daily. 90 tablet 2    hydrOXYzine (ATARAX) 25 MG tablet TAKE 1 TABLET BY MOUTH 3 (THREE) TIMES A DAY AS NEEDED FOR ITCHING. INDICATIONS: FEELING TENSE 90 tablet 2    azithromycin (Zithromax Z-Jeet) 250 MG tablet Take 2 tablets by mouth on day 1, then 1 tablet daily on days 2-5 (Patient not taking: Reported on 1/20/2025) 6 tablet 0    cefdinir (OMNICEF) 300 MG capsule Take 1 capsule by mouth 2 (Two) Times a Day. (Patient not taking: Reported on 1/20/2025) 14 capsule 0    ciprofloxacin (Cipro) 500 MG tablet Take 1 tablet by mouth 2 (Two) Times a Day. (Patient not taking: Reported on 1/20/2025) 10 tablet 0    methylPREDNISolone (MEDROL) 4 MG dose pack Take as directed on package instructions. (Patient not taking: Reported on 1/20/2025) 21 tablet 0  "    No facility-administered medications prior to visit.     No opioid medication identified on active medication list. I have reviewed chart for other potential  high risk medication/s and harmful drug interactions in the elderly.      Aspirin is not on active medication list.  Aspirin use is not indicated based on review of current medical condition/s. Risk of harm outweighs potential benefits.  .    Patient Active Problem List   Diagnosis    Actinic keratoses    Anal fissure    Hypertension    Benign prostate hyperplasia    Excessive sleepiness    Memory loss    PFO (patent foramen ovale)    Hypothyroidism    Hyperlipidemia    Syncope    Dementia without behavioral disturbance    Parkinsonian features    Prediabetes    Constipation    Emesis    Venous insufficiency    Pruritus     Advance Care Planning Advance Directive is on file.  ACP discussion was held with the patient during this visit. Patient has an advance directive in EMR which is still valid.             Objective   Vitals:    01/20/25 1410   BP: 110/60   BP Location: Left arm   Patient Position: Sitting   Cuff Size: Adult   Pulse: 56   Temp: 97.8 °F (36.6 °C)   TempSrc: Temporal   SpO2: 98%   Weight: 71.7 kg (158 lb)   Height: 175.3 cm (69\")   PainSc: 0-No pain       Estimated body mass index is 23.33 kg/m² as calculated from the following:    Height as of this encounter: 175.3 cm (69\").    Weight as of this encounter: 71.7 kg (158 lb).    BMI is within normal parameters. No other follow-up for BMI required.           Does the patient have evidence of cognitive impairment? Yes                                                                                               Health  Risk Assessment    Smoking Status:  Social History     Tobacco Use   Smoking Status Never   Smokeless Tobacco Never     Alcohol Consumption:  Social History     Substance and Sexual Activity   Alcohol Use No       Fall Risk Screen  STEADI Fall Risk Assessment was completed, and " patient is at HIGH risk for falls. Assessment completed on:2025    Depression Screening   Little interest or pleasure in doing things? Not at all   Feeling down, depressed, or hopeless? Not at all   PHQ-2 Total Score 0      Health Habits and Functional and Cognitive Screenin/20/2025     2:09 PM   Functional & Cognitive Status   Do you have difficulty preparing food and eating? Yes   Do you have difficulty bathing yourself, getting dressed or grooming yourself? Yes   Do you have difficulty using the toilet? Yes   Do you have difficulty moving around from place to place? Yes   Do you have trouble with steps or getting out of a bed or a chair? Yes   Current Diet Well Balanced Diet   Dental Exam Up to date   Eye Exam Up to date   Exercise (times per week) 0 times per week   Current Exercises Include No Regular Exercise   Do you need help using the phone?  Yes   Are you deaf or do you have serious difficulty hearing?  No   Do you need help to go to places out of walking distance? Yes   Do you need help shopping? Yes   Do you need help preparing meals?  Yes   Do you need help with housework?  Yes   Do you need help with laundry? Yes   Do you need help taking your medications? Yes   Do you need help managing money? Yes   Do you ever drive or ride in a car without wearing a seat belt? No   Have you felt unusual stress, anger or loneliness in the last month? No   Who do you live with? Spouse   If you need help, do you have trouble finding someone available to you? No   Have you been bothered in the last four weeks by sexual problems? No   Do you have difficulty concentrating, remembering or making decisions? Yes           Age-appropriate Screening Schedule:  Refer to the list below for future screening recommendations based on patient's age, sex and/or medical conditions. Orders for these recommended tests are listed in the plan section. The patient has been provided with a written plan.    Health Maintenance  List  Health Maintenance   Topic Date Due    ZOSTER VACCINE (1 of 2) Never done    RSV Vaccine - Adults (1 - 1-dose 75+ series) Never done    LIPID PANEL  11/16/2024    ANNUAL WELLNESS VISIT  01/20/2026    TDAP/TD VACCINES (2 - Td or Tdap) 01/25/2028    COVID-19 Vaccine  Completed    INFLUENZA VACCINE  Completed    Pneumococcal Vaccine 65+  Completed    HEMOGLOBIN A1C  Discontinued                                                                                                                                                 CMS Preventative Services Quick Reference  Risk Factors Identified During Encounter  Immunizations Discussed/Encouraged: RSV (Respiratory Syncytial Virus)  Dental Screening Recommended  Vision Screening Recommended    The above risks/problems have been discussed with the patient.  Pertinent information has been shared with the patient in the After Visit Summary.  An After Visit Summary and PPPS were made available to the patient.    Follow Up:   Next Medicare Wellness visit to be scheduled in 1 year.        Diagnoses and all orders for this visit:    1. Flu vaccine need (Primary)  -     Fluzone High-Dose 65+yrs    2. Need for COVID-19 vaccine  -     COVID-19 (Pfizer) 12yrs+ (COMIRNATY)    3. Dementia without behavioral disturbance    4. Parkinsonian features    5. Vomiting, unspecified vomiting type, unspecified whether nausea present    6. Pressure injury of deep tissue of left buttock    7. Primary hypertension    8. Impaired mobility and activities of daily living        Patient does not answer questions often.  Most often wife has to answer for him.    History of Present Illness  The patient presents for an annual exam.    History is provided by the accompanying person in the presence of the patient.  His physical health has shown a slight decline compared to the previous year. His advanced directive and living will are current. He is not up-to-date with his vision screening. He has recently  received his influenza and COVID-19 vaccines. His appetite remains satisfactory.    He experienced an episode of vomiting en route to the clinic today, which was characterized by dark, pinkish mucus. These episodes have been occurring more frequently, with the most recent one happening on the Saturday following Apolinar. The accompanying person notes that these episodes often occur in the car, but can also happen at home during breakfast.    He has a wound located on his buttock, which is being treated with Medihoney. The accompanying person reports that the wound started to bleed when it became red.    His scratching behavior has improved, and he is currently on a regimen of hydroxyzine, administered three times daily. He occasionally scratches his legs while seated in his chair.    MEDICATIONS  Current: hydroxyzine    IMMUNIZATIONS  He just got his flu and COVID-19 vaccines.    Assessment & Plan  1. Annual examination.  His weight remains stable at 153 pounds, with no significant changes observed in his physical appearance. He continues to maintain a satisfactory diet. The RSV vaccine was discussed as a potential preventive measure, but it was decided to postpone this due to his recent influenza and COVID-19 vaccinations. Blood work will be conducted for further evaluation.    2. Vomiting.  He has experienced episodes of vomiting, including one today and another on Saturday after Apolinar. The vomitus was described as dark with a pinkish color. He was advised to monitor these episodes closely and ensure he has blue bags available in the car for such incidents. Blood work will be conducted to rule out any underlying conditions.    3. Wound care.  He has a wound on his buttock, which is being treated with Medihoney. The wound appears to be moist but without any signs of breakdown. He was advised to continue using Medihoney and consider using Mepilex pads to keep the area dry and prevent infection. He was also  advised to monitor for any signs of infection and ensure proper hygiene to avoid incontinence affecting the wound.    4. Pruritus.  His scratching behavior has improved, with no new scratches observed during this visit. He was advised to continue wearing compression socks and long sleeves to prevent scratching. The use of hydroxyzine was recommended, along with the application of a moisturizer such as Eucerin cream or ointment after bathing to alleviate dryness.    Patient or patient representative verbalized consent for the use of Ambient Listening during the visit with  Black Franco MD for chart documentation. 1/20/2025  17:45 CST    Consider shingles and RSV vaccine.    Labs today    Unable to exercise routinely due to mobility issues  Patient eats well  Patient has advanced directive.      Result Review :           Follow Up:   Return in about 6 months (around 7/20/2025) for follow up for above problems. Longitudinal care..     An After Visit Summary and PPPS were made available to the patient.                   FORD Franco MD, FACP, FHM      Electronically signed by Black Franco MD, 01/20/25, 2:29 PM CST.

## 2025-02-06 ENCOUNTER — TELEPHONE (OUTPATIENT)
Dept: INTERNAL MEDICINE | Facility: CLINIC | Age: 89
End: 2025-02-06

## 2025-02-06 DIAGNOSIS — L89.890 PRESSURE INJURY OF OTHER SITE, UNSTAGEABLE: Primary | ICD-10-CM

## 2025-02-06 NOTE — TELEPHONE ENCOUNTER
Caller: Tricia Frausto    Relationship: Emergency Contact    Best call back number: [phone]     What is the best time to reach you: ASAP    Who are you requesting to speak with (clinical staff, provider,  specific staff member): CLINICAL     Do you know the name of the person who called:     What was the call regarding: PATIENT HAS A BAD PRESSURE SORE ON HIS TESTICLES, PATIENT'S WIFE DOESN'T KNOW WHAT TO DO TO TREAT IT AND WOULD LIKE A CALL BACK ASAP     Is it okay if the provider responds through MyChart: PHONE CALL

## 2025-02-06 NOTE — TELEPHONE ENCOUNTER
Looks like he was discharged from home health in Sept.  Do we need to re-order and see if they will pick him back up?

## 2025-02-24 ENCOUNTER — TELEPHONE (OUTPATIENT)
Dept: INTERNAL MEDICINE | Facility: CLINIC | Age: 89
End: 2025-02-24

## 2025-02-24 DIAGNOSIS — L89.326 PRESSURE INJURY OF DEEP TISSUE OF LEFT BUTTOCK: ICD-10-CM

## 2025-02-24 DIAGNOSIS — L89.890 PRESSURE INJURY OF OTHER SITE, UNSTAGEABLE: Primary | ICD-10-CM

## 2025-02-24 NOTE — TELEPHONE ENCOUNTER
SADEM with Joie that messaged received and thanks for the call.  Have messaged Purvimary Thomas at [facility] to ask if she has other alternatives for home health with his insurance.

## 2025-02-24 NOTE — TELEPHONE ENCOUNTER
She says she has tried [facility] and [facility] and having trouble obtaining any honey product, but couldn't elaborate as to why.

## 2025-02-24 NOTE — TELEPHONE ENCOUNTER
Joie with [facility] called regarding referral for patient. Patient has [employer] insurance and [facility] is out of network. Please call Joie at [phone]

## 2025-03-26 ENCOUNTER — TELEPHONE (OUTPATIENT)
Dept: INTERNAL MEDICINE | Facility: CLINIC | Age: 89
End: 2025-03-26

## 2025-03-26 DIAGNOSIS — F03.90 DEMENTIA WITHOUT BEHAVIORAL DISTURBANCE: Primary | ICD-10-CM

## 2025-03-26 NOTE — TELEPHONE ENCOUNTER
Pt states she just needs something to use at night, as he sleeps with his fists clenched and when they try to open his hands in the morning, it is painful.  Dr. Franco says we can try a resting hand splint, or PIL-O splint, which is a soft splint.  Will send order to [facility].

## 2025-03-26 NOTE — TELEPHONE ENCOUNTER
Caller: Tricia Frausto    Relationship: Emergency Contact    Best call back number: [phone]     What is the best time to reach you: ANYTIME-ASAP    Who are you requesting to speak with (clinical staff, provider,  specific staff member): CLINICAL     Do you know the name of the person who called:     What was the call regarding: PATIENT'S WIFE STATED THAT PATIENT HAS GOTTEN TO WHERE HE WILL BALL HIS HANDS UP IN FISTS AND WHEN THEY TRY TO STRETCH THEM OUT FOR HIM TO HOLD HIS WALKER OR ANYTHING ELSE HE ACTS LIKE HE IS IN EXTREME PAIN. SHE IS WONDERING IF THERE SOME TYPE OF BRACE SHE CAN GET TO HELP HIM KEEP HIS HANDS OPEN.     Is it okay if the provider responds through MyChart: PHONE CALL

## 2025-03-26 NOTE — TELEPHONE ENCOUNTER
Caller: Tricia Frausto    Relationship: Emergency Contact    Best call back number: [phone]    What is the best time to reach you: SOON PLEASE     Who are you requesting to speak with (clinical staff, provider,  specific staff member): PROVIDER OR CLINICAL STAFF         What was the call regarding: PATIENTS SPOUSE REQUESTING A CALL BACK TO DISCUSS SOME ISSUES SHE IS HAVING WITH PATIENT   SPOUSE WOULD NOT PROVIDE ANY FURTHER INFORMATION     Is it okay if the provider responds through [facility]: PREFERS A CALL BACK

## 2025-04-08 ENCOUNTER — TELEPHONE (OUTPATIENT)
Dept: INTERNAL MEDICINE | Facility: CLINIC | Age: 89
End: 2025-04-08
Payer: MEDICARE

## 2025-04-08 NOTE — TELEPHONE ENCOUNTER
Wife called stating the resting hand splints caused his hands and forearms to swell, even after trying to loosen the straps/fasteners.  She states he is still clenching his fists, right > left, when he sleeps and when she tries to open his fist and straighten his fingers in the morning, he cries out in pain.  She has tried applying topical pain gel and doesn't seem to help.  Any other suggestions as to what can be tried to help with this problem?

## 2025-04-09 NOTE — TELEPHONE ENCOUNTER
Left detailed VM outlining Dr. Franco's comments and to call us back if she wishes to try anything from a medication standpoint.

## 2025-04-30 RX ORDER — HYDROXYZINE HYDROCHLORIDE 25 MG/1
25 TABLET, FILM COATED ORAL 3 TIMES DAILY PRN
Qty: 90 TABLET | Refills: 2 | Status: SHIPPED | OUTPATIENT
Start: 2025-04-30

## 2025-05-01 ENCOUNTER — TELEPHONE (OUTPATIENT)
Dept: INTERNAL MEDICINE | Facility: CLINIC | Age: 89
End: 2025-05-01
Payer: MEDICARE

## 2025-05-01 DIAGNOSIS — F03.90 DEMENTIA WITHOUT BEHAVIORAL DISTURBANCE: Primary | ICD-10-CM

## 2025-05-15 ENCOUNTER — OFFICE VISIT (OUTPATIENT)
Dept: PALLATIVE CARE | Age: 89
End: 2025-05-15
Payer: MEDICARE

## 2025-05-15 DIAGNOSIS — Z74.09 IMPAIRED MOBILITY AND ACTIVITIES OF DAILY LIVING: ICD-10-CM

## 2025-05-15 DIAGNOSIS — Z78.9 IMPAIRED MOBILITY AND ACTIVITIES OF DAILY LIVING: ICD-10-CM

## 2025-05-15 DIAGNOSIS — Z51.5 ENCOUNTER FOR PALLIATIVE CARE: ICD-10-CM

## 2025-05-15 DIAGNOSIS — R11.10 VOMITING, UNSPECIFIED VOMITING TYPE, UNSPECIFIED WHETHER NAUSEA PRESENT: ICD-10-CM

## 2025-05-15 DIAGNOSIS — G20.A1 DEMENTIA DUE TO PARKINSON'S DISEASE, UNSPECIFIED DEMENTIA SEVERITY, UNSPECIFIED WHETHER BEHAVIORAL, PSYCHOTIC, OR MOOD DISTURBANCE OR ANXIETY (HCC): Primary | ICD-10-CM

## 2025-05-15 DIAGNOSIS — F02.80 DEMENTIA DUE TO PARKINSON'S DISEASE, UNSPECIFIED DEMENTIA SEVERITY, UNSPECIFIED WHETHER BEHAVIORAL, PSYCHOTIC, OR MOOD DISTURBANCE OR ANXIETY (HCC): Primary | ICD-10-CM

## 2025-05-15 PROCEDURE — 1159F MED LIST DOCD IN RCRD: CPT

## 2025-05-15 PROCEDURE — 1123F ACP DISCUSS/DSCN MKR DOCD: CPT

## 2025-05-15 PROCEDURE — 99350 HOME/RES VST EST HIGH MDM 60: CPT

## 2025-05-15 PROCEDURE — 1160F RVW MEDS BY RX/DR IN RCRD: CPT

## 2025-05-15 RX ORDER — HYDROXYZINE HYDROCHLORIDE 25 MG/1
25 TABLET, FILM COATED ORAL 3 TIMES DAILY PRN
COMMUNITY
Start: 2025-04-30

## 2025-05-15 RX ORDER — ONDANSETRON 4 MG/1
4 TABLET, FILM COATED ORAL EVERY 8 HOURS PRN
Qty: 30 TABLET | Refills: 1 | Status: SHIPPED | OUTPATIENT
Start: 2025-05-15

## 2025-05-15 NOTE — PROGRESS NOTES
Supportive Care/Community Based Palliative Care  Initial Consultation Note      Patient Name:  Colt Mera  Medical Record Number:  [id number]  YOB: 1935    Date of Visit: 5/15/2025  Location of Visit: home    Referring Provider: Teo Hicks MD  Patient Care Team:  Teo Hicks MD as PCP - Samm Phelps MD as Consulting Physician (Neurology)  Santa Patterson APRN - CNP as Nurse Practitioner (Palliative Medicine)    Reason for Consult:   Goals of care   Symptom Management    ACP  Family Support  Patient Support    History obtained from:  Patient, Spouse, electronic medical record    CHIEF CONCERN:     Chief Complaint   Patient presents with    Establish Care     Introduction to palliative care       CLINICAL SUMMARY AND HISTORY     Colt Mera is a 89 y.o. male with PMH of parkinsonism, dementia, ventriculomegaly, HTN, incontinence, osteoarthritis.  His dementia is quite advanced and he requires assistance with all ADLs.  Minimally verbal.  He lives at home with spouse support and hired care staff.  Family is wanting to de-escalate care and no aggressive measures.  Palliative care has been consulted for goals of care discussion, symptom management, and patient and family support.    Past Medical History:        Diagnosis Date    Alzheimer disease (HCC)     Depression     Hypertension     Memory difficulty     Osteoarthritis     Trigger finger     Unstable balance     Urinary incontinence        Past Surgical History:    Past Surgical History:   Procedure Laterality Date    CERVICAL DISC SURGERY      COLONOSCOPY  8-    [facility]    FINGER TRIGGER RELEASE Left 12/21/2016    MIDDLE FINGER TRIGGER RELEASE performed by Quan Rubi MD at [facility] OR    HERNIA REPAIR      RECTAL SURGERY      RECTAL NODULE REMOVAL    RECTAL SURGERY      FISSURE REPAIR    TONSILLECTOMY AND ADENOIDECTOMY         Family History:       Problem Relation Age of Onset    Colon Cancer Neg Hx

## 2025-05-20 ENCOUNTER — SOCIAL WORK (OUTPATIENT)
Dept: PALLATIVE CARE | Age: 89
End: 2025-05-20

## 2025-05-20 NOTE — PROGRESS NOTES
Today I visited with Marni Mera at her place of employment - [employer].  Marni was in her office and invited me to join her there.  She works part time and has paid caregivers to be with Colt when she is working.  She provides his POA ppw for me today and I will have it scanned and sent to Mary Kay Medley MA to add to his chart.  I also discussed the EMS DNR and she signed and I notarized.  I advised her to display at home.  She voiced understanding and no further needs.  I will follow PRN only.

## 2025-06-19 ENCOUNTER — CLINICAL SUPPORT (OUTPATIENT)
Dept: INTERNAL MEDICINE | Facility: CLINIC | Age: 89
End: 2025-06-19
Payer: MEDICARE

## 2025-06-19 DIAGNOSIS — R39.9 UTI SYMPTOMS: Primary | ICD-10-CM

## 2025-06-19 LAB
BILIRUB BLD-MCNC: NEGATIVE MG/DL
CLARITY, POC: ABNORMAL
COLOR UR: ABNORMAL
GLUCOSE UR STRIP-MCNC: NEGATIVE MG/DL
KETONES UR QL: NEGATIVE
LEUKOCYTE EST, POC: ABNORMAL
NITRITE UR-MCNC: POSITIVE MG/ML
PH UR: 9 [PH] (ref 5–8)
PROT UR STRIP-MCNC: ABNORMAL MG/DL
RBC # UR STRIP: NEGATIVE /UL
SP GR UR: 1.01 (ref 1–1.03)
UROBILINOGEN UR QL: ABNORMAL

## 2025-06-19 RX ORDER — CIPROFLOXACIN 500 MG/1
500 TABLET, FILM COATED ORAL 2 TIMES DAILY
Qty: 10 TABLET | Refills: 0 | Status: SHIPPED | OUTPATIENT
Start: 2025-06-19

## 2025-06-19 NOTE — PROGRESS NOTES
"Wife brought in urine sample, in a sterile container, for complaints of foul smell and \"goop in catheter\".  Dr. Franco reviewed POC UA and last culture results and says we will send in 5 days of Cipro and send off for microscopic UA and culture as well.  "

## 2025-06-24 ENCOUNTER — TELEPHONE (OUTPATIENT)
Dept: INTERNAL MEDICINE | Facility: CLINIC | Age: 89
End: 2025-06-24

## 2025-06-24 NOTE — TELEPHONE ENCOUNTER
Caller: Tricia Frausto    Relationship: Emergency Contact    Best call back number: [phone]     What is the best time to reach you: ANYTIME    Who are you requesting to speak with (clinical staff, provider,  specific staff member): CLINICAL    What was the call regarding: NEEDS TO DISCUSS A HAND BRACE    Is it okay if the provider responds through MyChart: NO

## 2025-06-26 LAB
APPEARANCE UR: ABNORMAL
BACTERIA #/AREA URNS HPF: ABNORMAL /HPF
BACTERIA UR CULT: ABNORMAL
BILIRUB UR QL STRIP: NEGATIVE
CASTS URNS MICRO: ABNORMAL
COLOR UR: ABNORMAL
CRYSTALS URNS MICRO: ABNORMAL
EPI CELLS #/AREA URNS HPF: ABNORMAL /HPF
GLUCOSE UR QL STRIP: ABNORMAL
HGB UR QL STRIP: NEGATIVE
KETONES UR QL STRIP: NEGATIVE
LEUKOCYTE ESTERASE UR QL STRIP: ABNORMAL
NITRITE UR QL STRIP: POSITIVE
OTHER ANTIBIOTIC SUSC ISLT: ABNORMAL
PH UR STRIP: >=9 [PH] (ref 5–8)
PROT UR QL STRIP: ABNORMAL
RBC #/AREA URNS HPF: ABNORMAL /HPF
SP GR UR STRIP: 1.02 (ref 1–1.03)
UROBILINOGEN UR STRIP-MCNC: ABNORMAL MG/DL
WBC #/AREA URNS HPF: ABNORMAL /HPF

## 2025-07-17 RX ORDER — CARBIDOPA AND LEVODOPA 25; 100 MG/1; MG/1
2 TABLET ORAL 3 TIMES DAILY
Qty: 180 TABLET | Refills: 5 | Status: SHIPPED | OUTPATIENT
Start: 2025-07-17

## 2025-07-23 ENCOUNTER — OFFICE VISIT (OUTPATIENT)
Dept: INTERNAL MEDICINE | Facility: CLINIC | Age: 89
End: 2025-07-23
Payer: MEDICARE

## 2025-07-23 VITALS
DIASTOLIC BLOOD PRESSURE: 60 MMHG | OXYGEN SATURATION: 97 % | SYSTOLIC BLOOD PRESSURE: 124 MMHG | WEIGHT: 166 LBS | TEMPERATURE: 97.4 F | HEIGHT: 69 IN | HEART RATE: 52 BPM | BODY MASS INDEX: 24.59 KG/M2

## 2025-07-23 DIAGNOSIS — R41.3 MEMORY LOSS: ICD-10-CM

## 2025-07-23 DIAGNOSIS — R29.818 PARKINSONIAN FEATURES: Chronic | ICD-10-CM

## 2025-07-23 DIAGNOSIS — I10 PRIMARY HYPERTENSION: ICD-10-CM

## 2025-07-23 DIAGNOSIS — F03.90 DEMENTIA WITHOUT BEHAVIORAL DISTURBANCE: ICD-10-CM

## 2025-07-23 DIAGNOSIS — M65.331 TRIGGER MIDDLE FINGER OF RIGHT HAND: ICD-10-CM

## 2025-07-23 DIAGNOSIS — M24.541 CONTRACTURE OF RIGHT HAND: Primary | ICD-10-CM

## 2025-08-12 ENCOUNTER — OFFICE VISIT (OUTPATIENT)
Age: 89
End: 2025-08-12
Payer: MEDICARE

## 2025-08-12 VITALS — BODY MASS INDEX: 24.44 KG/M2 | HEIGHT: 69 IN | WEIGHT: 165 LBS

## 2025-08-12 DIAGNOSIS — M24.541 CONTRACTURE OF JOINT OF FINGER OF RIGHT HAND: Primary | ICD-10-CM

## 2025-08-12 PROBLEM — E78.5 HYPERLIPIDEMIA: Status: ACTIVE | Noted: 2020-10-22

## 2025-08-12 PROBLEM — F03.90 DEMENTIA WITHOUT BEHAVIORAL DISTURBANCE (HCC): Chronic | Status: ACTIVE | Noted: 2021-10-30

## 2025-08-12 PROBLEM — E03.9 HYPOTHYROIDISM: Status: ACTIVE | Noted: 2020-10-22

## 2025-08-12 PROBLEM — I10 HYPERTENSION: Chronic | Status: ACTIVE | Noted: 2020-10-22

## 2025-08-12 PROBLEM — I87.2 VENOUS INSUFFICIENCY: Status: ACTIVE | Noted: 2024-05-16

## 2025-08-12 PROBLEM — N40.0 BENIGN PROSTATE HYPERPLASIA: Status: ACTIVE | Noted: 2020-10-22

## 2025-08-12 PROBLEM — L57.0 ACTINIC KERATOSES: Status: ACTIVE | Noted: 2020-10-22

## 2025-08-12 PROBLEM — R29.818 PARKINSONIAN FEATURES: Chronic | Status: ACTIVE | Noted: 2023-01-05

## 2025-08-12 PROCEDURE — 99204 OFFICE O/P NEW MOD 45 MIN: CPT | Performed by: ORTHOPAEDIC SURGERY

## 2025-08-12 PROCEDURE — 1159F MED LIST DOCD IN RCRD: CPT | Performed by: ORTHOPAEDIC SURGERY

## 2025-08-12 PROCEDURE — 1160F RVW MEDS BY RX/DR IN RCRD: CPT | Performed by: ORTHOPAEDIC SURGERY

## 2025-08-12 PROCEDURE — 1123F ACP DISCUSS/DSCN MKR DOCD: CPT | Performed by: ORTHOPAEDIC SURGERY

## 2025-08-13 ENCOUNTER — TELEPHONE (OUTPATIENT)
Age: 89
End: 2025-08-13

## 2025-08-18 DIAGNOSIS — M24.541 CONTRACTURE OF JOINT OF FINGER OF RIGHT HAND: Primary | ICD-10-CM

## 2025-08-18 RX ORDER — OXYCODONE AND ACETAMINOPHEN 5; 325 MG/1; MG/1
1 TABLET ORAL EVERY 4 HOURS PRN
Qty: 21 TABLET | Refills: 0 | Status: SHIPPED | OUTPATIENT
Start: 2025-08-18 | End: 2025-08-23

## 2025-08-20 RX ORDER — HYDROXYZINE HYDROCHLORIDE 25 MG/1
25 TABLET, FILM COATED ORAL 3 TIMES DAILY PRN
Qty: 90 TABLET | Refills: 2 | Status: SHIPPED | OUTPATIENT
Start: 2025-08-20

## (undated) DEVICE — BNDG ELAS SUREWRAP W/CLIP 3IN 5YD LF

## (undated) DEVICE — BNDG ESMARK 4IN 9FT LF STRL BLU

## (undated) DEVICE — SPNG GZ WOVN 4X4IN 12PLY 10/BX STRL

## (undated) DEVICE — GLV SURG TRIUMPH ORTHO W/ALOE PF LTX 7.5 STRL

## (undated) DEVICE — CVR BRD ARM 13X30

## (undated) DEVICE — PK TURNOVER RM ADV

## (undated) DEVICE — UNDERGLV SURG BIOGEL INDICAT PF 71/2 GRN

## (undated) DEVICE — BNDG GZ SOF-FORM CONFRM 2X75IN LF STRL

## (undated) DEVICE — DRSNG GZ CURAD XEROFORM NONADHS 5X9IN STRL

## (undated) DEVICE — DISPOSABLE TOURNIQUET CUFF SINGLE BLADDER, SINGLE PORT AND QUICK CONNECT CONNECTOR: Brand: COLOR CUFF

## (undated) DEVICE — SUT ETHLN 5/0 FS2 18IN 661H

## (undated) DEVICE — GLV SURG TRIUMPH GREEN W/ALOE PF LTX 8 STRL

## (undated) DEVICE — GLV SURG TRIUMPH MICRO PF LTX 7.5 STRL

## (undated) DEVICE — GOWN,PREVENTION PLUS,XLONG/XLARGE,STRL: Brand: MEDLINE

## (undated) DEVICE — ELECTRD NDL EDGE/INSUL/PFTE.787MM 2.84IN

## (undated) DEVICE — PK EXTRM 30